# Patient Record
Sex: MALE | Race: WHITE | NOT HISPANIC OR LATINO | Employment: OTHER | URBAN - METROPOLITAN AREA
[De-identification: names, ages, dates, MRNs, and addresses within clinical notes are randomized per-mention and may not be internally consistent; named-entity substitution may affect disease eponyms.]

---

## 2017-09-14 ENCOUNTER — OFFICE VISIT (OUTPATIENT)
Dept: PODIATRY | Facility: CLINIC | Age: 57
End: 2017-09-14
Payer: COMMERCIAL

## 2017-09-14 VITALS
BODY MASS INDEX: 23.72 KG/M2 | WEIGHT: 186 LBS | DIASTOLIC BLOOD PRESSURE: 70 MMHG | HEART RATE: 62 BPM | SYSTOLIC BLOOD PRESSURE: 124 MMHG

## 2017-09-14 DIAGNOSIS — L60.0 INGROWING NAIL: Primary | ICD-10-CM

## 2017-09-14 DIAGNOSIS — L84 CORNS AND CALLOSITIES: ICD-10-CM

## 2017-09-14 DIAGNOSIS — B35.1 DERMATOPHYTOSIS OF NAIL: ICD-10-CM

## 2017-09-14 PROCEDURE — 11055 PARING/CUTG B9 HYPRKER LES 1: CPT | Performed by: PODIATRIST

## 2017-09-14 PROCEDURE — 11730 AVULSION NAIL PLATE SIMPLE 1: CPT | Mod: T5 | Performed by: PODIATRIST

## 2017-09-14 PROCEDURE — 11720 DEBRIDE NAIL 1-5: CPT | Mod: 59 | Performed by: PODIATRIST

## 2017-09-14 PROCEDURE — 99204 OFFICE O/P NEW MOD 45 MIN: CPT | Mod: 25 | Performed by: PODIATRIST

## 2017-09-14 RX ORDER — CEPHALEXIN 500 MG/1
500 CAPSULE ORAL
COMMUNITY
Start: 2017-09-04 | End: 2017-09-14

## 2017-09-14 NOTE — MR AVS SNAPSHOT
After Visit Summary   9/14/2017    Freeman Velazquez    MRN: [id number]           Patient Information     Date Of Birth          1960        Visit Information        Provider Department      9/14/2017 1:30 PM Phan Mccracken DPM [facility]        Today's Diagnoses     Ingrowing nail    -  1    Dermatophytosis of nail        Corns and callosities          Care Instructions    Weight management plan: Patient was referred to their PCP to discuss a diet and exercise plan.            Follow-ups after your visit        Your next 10 appointments already scheduled     Sep 19, 2017  3:20 PM CDT   Office Visit with Kimmy Bell MD   [facility] ([facility] )    [address] [place] [address]  [address] [place] [id number] [phone]           Bring a current list of meds and any records pertaining to this visit. For Physicals, please bring immunization records and any forms needing to be filled out. Please arrive 10 minutes early to complete paperwork.              Who to contact     If you have questions or need follow up information about today's clinic visit or your schedule please contact [facility] directly at [phone].  Normal or non-critical lab and imaging results will be communicated to you by MyChart, letter or phone within 4 business days after the clinic has received the results. If you do not hear from us within 7 days, please contact the clinic through Swypehart or phone. If you have a critical or abnormal lab result, we will notify you by phone as soon as possible.  Submit refill requests through Souqalmal or call your pharmacy and they will forward the refill request to us. Please allow 3 business days for your refill to be completed.          Additional Information About Your Visit        Swypehart Information     Souqalmal lets you send messages to your doctor, view your test results, renew your  "prescriptions, schedule appointments and more. To sign up, go to [email or web address] . Click on \"Log in\" on the left side of the screen, which will take you to the Welcome page. Then click on \"Sign up Now\" on the right side of the page.     You will be asked to enter the access code listed below, as well as some personal information. Please follow the directions to create your username and password.     Your access code is: [email or web address]  Expires: 2017 11:17 AM     Your access code will  in 90 days. If you need help or a new code, please call your [facility] clinic or [phone].        Care EveryWhere ID     This is your Care EveryWhere ID. This could be used by other organizations to access your [facility] medical records  [id number]        Your Vitals Were     Pulse BMI (Body Mass Index)                62 23.72 kg/m2           Blood Pressure from Last 3 Encounters:   17 124/70   16 138/76   16 100/70    Weight from Last 3 Encounters:   17 84.4 kg (186 lb)   16 84.8 kg (187 lb)   14 94.8 kg (209 lb)              We Performed the Following     DEBRIDEMENT OF NAIL(S), 1-5     REMOVAL OF NAIL PLATE SIMPLE SINGLE     TRIM HYPERKERATOTIC SKIN LESION, ONE        Primary Care Provider    None Specified       No primary provider on file.        Equal Access to Services     [facility]: Hadii chiara ku hadasho Soandryali, waaxda luqadaha, qaybta kaalmada adeegyada, bianca wang . So [facility] [phone].    ATENCIÓN: Si habla español, tiene a lloyd disposición servicios gratuitos de asistencia lingüística. Llame al [phone].    We comply with applicable federal civil rights laws and [place] laws. We do not discriminate on the basis of race, color, national origin, age, disability sex, sexual orientation or gender identity.            Thank you!     Thank you for choosing [facility]  for your care. Our goal is always to provide you " with excellent care. Hearing back from our patients is one way we can continue to improve our services. Please take a few minutes to complete the written survey that you may receive in the mail after your visit with us. Thank you!             Your Updated Medication List - Protect others around you: Learn how to safely use, store and throw away your medicines at www.disposemymeds.org.          This list is accurate as of: 9/14/17 11:59 PM.  Always use your most recent med list.                   Brand Name Dispense Instructions for use Diagnosis    ALEVE PO      Take by mouth as needed for moderate pain        cephALEXin 500 MG capsule    KEFLEX     Take 500 mg by mouth

## 2017-09-15 NOTE — PROGRESS NOTES
Patient complains of thick left first toenails(s) .  Has had this for 5 years.  Had trauma to nail lost nail, and grew slow ever since.  Also has left foot sub fifth met head pain.  It is slowly getting worse.  Has had pain in the past which is aggravated by activity and relieved by rest.  Burning pain.  For last few weeks right hallux nail has been painful.  See in urgent care, placed on keflex and had abcess lanced.  This helped, but still having pain.    Trauma to this nail in past.  Non smoker.  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        ROS:  A 10-point review of systems was performed and is positive for that noted in the HPI and as seen below.  All other areas are negative.          Allergies   Allergen Reactions     Tetracycline Shortness Of Breath     Erythromycin        Current Outpatient Prescriptions   Medication Sig Dispense Refill     Naproxen Sodium (ALEVE PO) Take by mouth as needed for moderate pain         Patient Active Problem List   Diagnosis     CARDIOVASCULAR SCREENING; LDL GOAL LESS THAN 130     Distal radius fracture     Family history of malignant neoplasm of prostate     ACP (advance care planning)       Past Medical History:   Diagnosis Date     Arthritis      Renal colic        Past Surgical History:   Procedure Laterality Date     ARTHROSCOPY KNEE BILATERAL       COLONOSCOPY  4/16/2013    Procedure: COLONOSCOPY;;  Surgeon: Lewis Metcalf MD;  Location: [facility] OR       Family History   Problem Relation Age of Onset     Breast Cancer Mother      Allergies Mother      Anesthesia Reaction Mother      Arthritis Mother      Blood Disease Mother      Cardiovascular Father      Eye Disorder Father      HEART DISEASE Father      CANCER Father      multiple skin cancers     Macular Degeneration Father      Glaucoma Father      DIABETES Sister      DIABETES Maternal Grandmother      Thyroid Disease No family hx of      CEREBROVASCULAR DISEASE No family hx of      Hypertension No family hx  of        Social History   Substance Use Topics     Smoking status: Never Smoker     Smokeless tobacco: Never Used     Alcohol use Yes      Comment: 1 drink a week         /70 (BP Location: Left arm, Patient Position: Sitting)  Pulse 62  Wt 84.4 kg (186 lb)  BMI 23.72 kg/m2.      VConstitutional/ general:  Pt is in no apparent distress, appears well-nourished.  Cooperative with history and physical exam.     Psych:  The patient answered questions appropriately.  Normal affect.  Seems to have reasonable expectations, in terms of treatment.    Eyes:  Visual scanning/ tracking without deficit.    Ears:  Response to auditory stimuli is normal.  negative hearing aid devices.  Auricles in proper alignment.     Lymphatic:  Popliteal lymph nodes not enlarged.     Lungs:  Non labored breathing, non labored speech. No cough.  No audible wheezing. Even, quiet breathing.       Vascular:  Pedal pulses are palpable bilaterally for both the DP and PT arteries.  CFT < 3 sec.  No edema.  Pedal hair growth noted.     Neuro:  Alert and oriented x 3. Coordinated gait.  Light touch sensation is intact to the L4, L5, S1 distributions. No obvious deficits.  No evidence of neurological-based weakness, spasticity, or contracture in the lower extremities.    Derm: Normal texture and turgor.  No erythema, ecchymosis, or cyanosis.  No open lesions. left first toenails(s)  thickened, elongated, discolored, with subungual debris and lateral deviated.  No erythema, edema, drainage, pain on palpation.  No signs of subungual masses or exostosis.    Right hallux nail is very thick and proximal 1/3 loose.  Slight erythema surrounding nail.  Underlying nailbed healthy.      Callus left foot sub fifth met head.  Underlying tissue healthy.  No masses.        Musculoskeletal:    Lower extremity muscle strength is normal.  Patient is ambulatory without an assistive device or brace.  No gross deformities.  MS 5/5 all compartments.  Normal arch  with weightbearing.         A/P    Onychodystrophy left hallux nail  Right hallux nail paronychia  Left sub fifth met head callus.      Discussed all options with patient regarding left hallux nail.  unfortunately nothing can be done to make this nail normal again.  Mycotic nail manually debrided with a .  He will keep this clean and filled short.     Explained left sub fifth lesion is a callus and discussed cause.  Callus debrided with a fifteen blade.  He will keep this down with a pumice stone and stiff shoes at all times.      Discussed etiology and treatment options with the pt.  Risks and benefits of partial temporary nail removal were discussed in detail.  Obtained consent and blocked right hallux using 3 cc of 1% Lidocaine plain.  Sterile prep and avulsed the left hallux nail and placed dry sterile dressing.  Cappillary refill time wnl.  Pt tolerated procedure well.  Wound care instructions given and RTC PRN.               Phan Mccracken DPM, FACFAS

## 2017-09-19 ENCOUNTER — OFFICE VISIT (OUTPATIENT)
Dept: FAMILY MEDICINE | Facility: CLINIC | Age: 57
End: 2017-09-19
Payer: COMMERCIAL

## 2017-09-19 DIAGNOSIS — L82.1 SEBORRHEIC KERATOSES: ICD-10-CM

## 2017-09-19 DIAGNOSIS — Z12.83 SKIN CANCER SCREENING: Primary | ICD-10-CM

## 2017-09-19 DIAGNOSIS — L81.4 SOLAR LENTIGO: ICD-10-CM

## 2017-09-19 DIAGNOSIS — D18.01 CAPILLARY HEMANGIOMA OF SKIN: ICD-10-CM

## 2017-09-19 DIAGNOSIS — L57.0 AK (ACTINIC KERATOSIS): ICD-10-CM

## 2017-09-19 DIAGNOSIS — Z80.8 FAMILY HISTORY OF MALIGNANT MELANOMA OF SKIN: ICD-10-CM

## 2017-09-19 DIAGNOSIS — D22.9 MULTIPLE BENIGN MELANOCYTIC NEVI: ICD-10-CM

## 2017-09-19 PROCEDURE — 99213 OFFICE O/P EST LOW 20 MIN: CPT | Mod: 25 | Performed by: FAMILY MEDICINE

## 2017-09-19 PROCEDURE — 17000 DESTRUCT PREMALG LESION: CPT | Performed by: FAMILY MEDICINE

## 2017-09-19 NOTE — PATIENT INSTRUCTIONS
"FUTURE APPOINTMENTS  Follow up in 1 year(s) for a full-body skin cancer screening.    SUN PROTECTION INSTRUCTIONS  Sun damage can lead to skin cancer and premature aging of the skin.      The best way to protect from sun damage to your skin is to avoid the sun during peak hours (10 am - 2 pm) even on overcast days.      Use UPF sun-protective clothing, which while more expensive initially provides longer lasting coverage without having to worry about remembering to re-apply.  1. Wear a wide-brimmed hat and sunglasses.   2. Wear sun-protective clothing.  [employer] and other [employer] make sun protective clothing that are stylish, comfortable and cool. [employer] and other [employer] make UV arm sleeves suitable for golfing, gardening and other activities.      Sunscreen instructions:  1. Use sunscreens with Zinc Oxide, Titanium Dioxide or Avobenzone to protect from UVA rays.  2. Use SPF 30-50+ to protect from UVB rays.  3. Re-apply every 2 hours even if water resistant.  4. Apply on your face every day even when cloudy and even in the winter. UVA \"aging rays\" penetrate window glass and are just as strong in the winter as in the summer.    Product Recommendations:    Good examples include: Blue Lizard, EltaMD, Solbar    Good daily moisturizers with SPF: Vanicream, CeraVe.    For sensitive skin, consider : SkinMedica Essential Defense Mineral Shield Broad Spectrum SPF 35      Never use tanning beds. Tanning beds are associated with much higher risks of skin cancer.    All tanning damages the skin. Aim for ivory skin year round and you will have less trouble with your skin in years to come. There is no merit in getting \"a base tan\" before a warm weather vacation, as any tanning indicates your body's response to sun damage.    Stop smoking. Smokers have higher rates of skin cancer and also have premature skin wrinkling.    FYI  You should use about 3 tablespoons of sunscreen to protect your whole body. Thus a " typical eight ounce bottle of sunscreen should last 4 applications. Remember, that the SPF rating is compromised if you don t apply enough. Most people only apply 1/2 - 1/3 of the amount they need. Also don t forget areas such as your ears, feet, upper back and harder to reach places. Keep in mind that these amounts should be increased for larger body sizes.    Sunscreens with titanium dioxide and/or zinc oxide in the active ingredients are physical blockers as opposed to chemical blockers. Chemical-free sunscreens should not irritate the skin.    Spray-on sunscreens may be used for touch-up application only, not as a base layer. Also, use with caution around small children due to inhalation risk.    Avoid retinyl palmitate products.    Avoid combination products that include both sunscreen and insect repellant, as sunscreen should be applied every 2 hours, but insect repellant should not be applied as frequently.    SPF means sun protection factor, which is just the degree to which the sunscreen can protect against UVB rays. There is no rating system for UVA rays. SPF is calculated as the time skin will burn when sunscreen is applied vs. skin without sunscreen.    Water resistant sunscreens should be re-applied every 1-2 hours.    For more information:  http://www.skincancer.org/prevention/sun-protection/sunscreen/sunscreens-safe-and-effective    CRYOTHERAPY FOR ACTINIC KERATOSES POST-TREATMENT CARE INSTRUCTIONS  Actinic keratoses are benign, scaly or gritty lesions that appear in sun-exposed areas and may progress to skin cancers. For this reason, it is important to treat them before they become cancerous.  Liquid nitrogen is mildly uncomfortable when applied to the skin, but the discomfort rapidly subsides.    Post-Treatment:  You may experience burning and/or stinging immediately following the procedure. The discomfort from the procedure may persist over the next 12-24 hours. The area treated will look pinker  and slightly swollen before the healing process begins. You may also notice redness, swelling, tenderness, weeping and crusts or scabs. Healing time is approximately 10-14 days.    Blister - You may or may notice blistering from the freezing. If you develop an uncomfortable blister from today's treatment, you may gently puncture this with a needle that has been cleaned with alcohol. However, do not remove the protective skin layer of the blister.    Scab - After a few days, you may notice scaliness or scab formation. Do not pick at the scabs because this may cause slower healing and a permanent scar.    The skin may appear temporarily darker at the treatment site, but this usually fades over a period of months, provided that the area is protected from the sun.    Care of the areas treated:    Wash the area with a mild cleanser.    Gently pat dry.    Do not rub.     Keep protected from the sun during the healing process and for a full year following treatment as the skin continues to remodel during this time.    Apply Vaseline or Aquaphor ointment sparingly to the site for the first 7 days after treatment.    Do not use Neosporin, as many people eventually develop a medication allergy, that can easily be confused with an infection, to Neomycin.    Return if:  There should not be any residual scaling. If there is any concern that the lesion has persisted after 4-6 weeks, make an appointment for a re-check. Healing time does vary depending on your individual healing process and the area of the body treated. Most patients will be healed in one month.    Signs of Infection:  Thankfully this is rare. However if you notice persistent colored drainage, increasing pain, fever or other signs of infection, please call us at: [phone]

## 2017-09-19 NOTE — PROGRESS NOTES
[facility] - PRIMARY CARE SKIN    CC : skin cancer screening (full-body)  SUBJECTIVE:                                                    Freeman Velazquez is a 57 year old male who presents to clinic today for a full-body skin exam because of his family history of melanoma.    Bothersome lesions noticed by the patient or other skin concerns :  Issue One : Lesion on right upper cheek has peeled and flaked intermittently. Redness has persisted.  Onset : 1 year ago.  Enlarging : YES.  Bleeding : NO  Itchy or irritating : NO.  Pain or tenderness : NO.  Changing color : NO.  Issue Two : Lesion on right elbow is concerning due to irregular shape.  Onset : unknown.  Enlarging : NO.  Bleeding : NO  Itchy or irritating : YES - occasionally itchy.  Pain or tenderness : NO.  Changing color : YES.  Issue Three : Lesion on upper forehead.  Onset : years ago.  Enlarging : NO.  Bleeding : NO  Itchy or irritating : NO.  Pain or tenderness : NO.  Changing color : YES.    Personal history of skin cancer : NO.  Family history of skin cancer : YES - father  of metastatic malignant melanoma and melanoma in paternal uncle. No skin cancer in mother's side    Sun Exposure History  Previous history of significant sun exposure: YES  Sunscreen Use : YES, frequency : he reports increasingly frequent use as he ages.  Wide-brimmed hats : YES.  Avoids mid-day sun : YES.    Occupation :  (indoor).    Refer to electronic medical record (EMR) for past medical history and medications.    INTEGUMENTARY/SKIN: POSITIVE for changing lesions  ROS : 14 point review of systems was negative except the symptoms listed above in the HPI.    This document serves as a record of the services and decisions personally performed and made by Nita Bell MD. It was created on her behalf by Naveed Dumont, a trained medical scribe.  The creation of this document is based on the scribe's personal observations and the provider's statements to  "the medical scribe.  Naveed Dumont, September 19, 2017 3:14 PM      OBJECTIVE:                                                    GENERAL: healthy, alert and no distress  SKIN: Lopez Skin Type - II.  This patient was examined from the top of the head to the bottom of the feet  including scalp, face, neck, back, chest, breasts, buttocks, both arms, both legs, both hands, both feet, all 10 fingers and all 10 toes. The dermatoscope was used to help evaluate pigmented lesions.  Skin Pertinent Findings:  Face, Arms : Scattered, brown, macule(s) most consistent with benign solar lentigo.    Arms : Scattered, brown macules most consistent with benign nevi (melanocytic nevi)    Chest, Abdomen : Scattered, slightly raised, red lesion(s) consistent with capillary hemangioma. Scattered, \"stuck on\" appearing papules, raised, brown, coarse-textured, round lesion(s) most consistent with seborrheic keratoses.    Back : slightly raised, red lesion(s) consistent with capillary hemangioma. Few, brown macules most consistent with benign nevi (melanocytic nevi).    Significant Findings:  Right temple : 5 mm in size, erythematous, scaly, non-indurated lesion(s) most consistent with actinic keratoses.  Name : Liquid Nitrogen Cry-Ac Cryotherapy.  Indication : Pre-malignant lesion.  Location(s) : right temple - x1.  Completed by : Nita Bell MD  Note : Discussed natural history of lesion and treatment options. Prior to treatment, we discussed inflammation, tenderness post-procedure, the healing process, and the risks of pain, infection, scarring, blistering, and hypo-/hyperpigmentation after healing. Explained that these lesions may grow back and may need additional treatment or re-treatment. The patient expressed a desire to proceed with cryotherapy.    The lesion(s) was treated with liquid nitrogen Cry-Ac, five second freeze repeated twice with a pause to allow for the area to thaw. If this lesion should recur, then it needs to be " "re-evaluated.    Patient tolerated the procedure well and left in good condition.  Total number of lesions treated : 1.    Diagnostic Test Results:  none   .      ASSESSMENT:                                                      Encounter Diagnoses   Name Primary?     Skin cancer screening Yes     Family history of malignant melanoma of skin      AK (actinic keratosis)      Seborrheic keratoses      Solar lentigo      Multiple benign melanocytic nevi      Capillary hemangioma of skin          PLAN:                                                    Patient Instructions   FUTURE APPOINTMENTS  Follow up in 1 year(s) for a full-body skin cancer screening.    SUN PROTECTION INSTRUCTIONS  Sun damage can lead to skin cancer and premature aging of the skin.      The best way to protect from sun damage to your skin is to avoid the sun during peak hours (10 am - 2 pm) even on overcast days.      Use UPF sun-protective clothing, which while more expensive initially provides longer lasting coverage without having to worry about remembering to re-apply.  1. Wear a wide-brimmed hat and sunglasses.   2. Wear sun-protective clothing.  [employer] and other [employer] make sun protective clothing that are stylish, comfortable and cool. [employer] and other [employer] make UV arm sleeves suitable for golfing, gardening and other activities.      Sunscreen instructions:  1. Use sunscreens with Zinc Oxide, Titanium Dioxide or Avobenzone to protect from UVA rays.  2. Use SPF 30-50+ to protect from UVB rays.  3. Re-apply every 2 hours even if water resistant.  4. Apply on your face every day even when cloudy and even in the winter. UVA \"aging rays\" penetrate window glass and are just as strong in the winter as in the summer.    Product Recommendations:    Good examples include: Jacky Izaguirre, EltaMD, Solbar    Good daily moisturizers with SPF: Vanicream, CeraVe.    For sensitive skin, consider : SkinMedica Essential Defense " "Mineral Shield Broad Spectrum SPF 35      Never use tanning beds. Tanning beds are associated with much higher risks of skin cancer.    All tanning damages the skin. Aim for ivory skin year round and you will have less trouble with your skin in years to come. There is no merit in getting \"a base tan\" before a warm weather vacation, as any tanning indicates your body's response to sun damage.    Stop smoking. Smokers have higher rates of skin cancer and also have premature skin wrinkling.    FYI  You should use about 3 tablespoons of sunscreen to protect your whole body. Thus a typical eight ounce bottle of sunscreen should last 4 applications. Remember, that the SPF rating is compromised if you don t apply enough. Most people only apply 1/2 - 1/3 of the amount they need. Also don t forget areas such as your ears, feet, upper back and harder to reach places. Keep in mind that these amounts should be increased for larger body sizes.    Sunscreens with titanium dioxide and/or zinc oxide in the active ingredients are physical blockers as opposed to chemical blockers. Chemical-free sunscreens should not irritate the skin.    Spray-on sunscreens may be used for touch-up application only, not as a base layer. Also, use with caution around small children due to inhalation risk.    Avoid retinyl palmitate products.    Avoid combination products that include both sunscreen and insect repellant, as sunscreen should be applied every 2 hours, but insect repellant should not be applied as frequently.    SPF means sun protection factor, which is just the degree to which the sunscreen can protect against UVB rays. There is no rating system for UVA rays. SPF is calculated as the time skin will burn when sunscreen is applied vs. skin without sunscreen.    Water resistant sunscreens should be re-applied every 1-2 hours.    For more " information:  http://www.skincancer.org/prevention/sun-protection/sunscreen/sunscreens-safe-and-effective    CRYOTHERAPY FOR ACTINIC KERATOSES POST-TREATMENT CARE INSTRUCTIONS  Actinic keratoses are benign, scaly or gritty lesions that appear in sun-exposed areas and may progress to skin cancers. For this reason, it is important to treat them before they become cancerous.  Liquid nitrogen is mildly uncomfortable when applied to the skin, but the discomfort rapidly subsides.    Post-Treatment:  You may experience burning and/or stinging immediately following the procedure. The discomfort from the procedure may persist over the next 12-24 hours. The area treated will look pinker and slightly swollen before the healing process begins. You may also notice redness, swelling, tenderness, weeping and crusts or scabs. Healing time is approximately 10-14 days.    Blister - You may or may notice blistering from the freezing. If you develop an uncomfortable blister from today's treatment, you may gently puncture this with a needle that has been cleaned with alcohol. However, do not remove the protective skin layer of the blister.    Scab - After a few days, you may notice scaliness or scab formation. Do not pick at the scabs because this may cause slower healing and a permanent scar.    The skin may appear temporarily darker at the treatment site, but this usually fades over a period of months, provided that the area is protected from the sun.    Care of the areas treated:    Wash the area with a mild cleanser.    Gently pat dry.    Do not rub.     Keep protected from the sun during the healing process and for a full year following treatment as the skin continues to remodel during this time.    Apply Vaseline or Aquaphor ointment sparingly to the site for the first 7 days after treatment.    Do not use Neosporin, as many people eventually develop a medication allergy, that can easily be confused with an infection, to  Neomycin.    Return if:  There should not be any residual scaling. If there is any concern that the lesion has persisted after 4-6 weeks, make an appointment for a re-check. Healing time does vary depending on your individual healing process and the area of the body treated. Most patients will be healed in one month.    Signs of Infection:  Thankfully this is rare. However if you notice persistent colored drainage, increasing pain, fever or other signs of infection, please call us at: [phone]    The patient was counseled about sunscreens and sun avoidance. The patient was counseled to check the skin regularly and report any lesion that is new, changing, itching, scabbing, bleeding or otherwise bothersome. The patient was discharged ambulatory and in stable condition.    Educational Brochures given to patient : skin cancer.       PROCEDURES:                                                    None.    TT : 25 minutes.  CT : 15 minutes.      The information in this document, created by the medical scribe for me, accurately reflects the services I personally performed and the decisions made by me. I have reviewed and approved this document for accuracy prior to leaving the patient care area.  Nita Bell MD September 19, 2017 3:14 PM  [facility] - PRIMARY CARE SKIN

## 2018-01-04 ENCOUNTER — OFFICE VISIT (OUTPATIENT)
Dept: FAMILY MEDICINE | Facility: CLINIC | Age: 58
End: 2018-01-04
Payer: COMMERCIAL

## 2018-01-04 VITALS
WEIGHT: 190.5 LBS | TEMPERATURE: 97.5 F | BODY MASS INDEX: 24.45 KG/M2 | HEIGHT: 74 IN | OXYGEN SATURATION: 99 % | HEART RATE: 62 BPM | RESPIRATION RATE: 12 BRPM | SYSTOLIC BLOOD PRESSURE: 132 MMHG | DIASTOLIC BLOOD PRESSURE: 70 MMHG

## 2018-01-04 DIAGNOSIS — K21.00 GASTROESOPHAGEAL REFLUX DISEASE WITH ESOPHAGITIS: ICD-10-CM

## 2018-01-04 DIAGNOSIS — R10.11 ABDOMINAL PAIN, RIGHT UPPER QUADRANT: Primary | ICD-10-CM

## 2018-01-04 LAB
ALBUMIN SERPL-MCNC: 4 G/DL (ref 3.4–5)
ALP SERPL-CCNC: 98 U/L (ref 40–150)
ALT SERPL W P-5'-P-CCNC: 36 U/L (ref 0–70)
AMYLASE SERPL-CCNC: 44 U/L (ref 30–110)
ANION GAP SERPL CALCULATED.3IONS-SCNC: 10 MMOL/L (ref 3–14)
AST SERPL W P-5'-P-CCNC: 23 U/L (ref 0–45)
BASOPHILS # BLD AUTO: 0 10E9/L (ref 0–0.2)
BASOPHILS NFR BLD AUTO: 0.3 %
BILIRUB SERPL-MCNC: 0.6 MG/DL (ref 0.2–1.3)
BUN SERPL-MCNC: 15 MG/DL (ref 7–30)
CALCIUM SERPL-MCNC: 8.7 MG/DL (ref 8.5–10.1)
CHLORIDE SERPL-SCNC: 103 MMOL/L (ref 94–109)
CO2 SERPL-SCNC: 28 MMOL/L (ref 20–32)
CREAT SERPL-MCNC: 0.89 MG/DL (ref 0.66–1.25)
DIFFERENTIAL METHOD BLD: NORMAL
EOSINOPHIL # BLD AUTO: 0.2 10E9/L (ref 0–0.7)
EOSINOPHIL NFR BLD AUTO: 2.4 %
ERYTHROCYTE [DISTWIDTH] IN BLOOD BY AUTOMATED COUNT: 14.2 % (ref 10–15)
GFR SERPL CREATININE-BSD FRML MDRD: 88 ML/MIN/1.7M2
GLUCOSE SERPL-MCNC: 82 MG/DL (ref 70–99)
HCT VFR BLD AUTO: 44 % (ref 40–53)
HGB BLD-MCNC: 14.9 G/DL (ref 13.3–17.7)
LIPASE SERPL-CCNC: 133 U/L (ref 73–393)
LYMPHOCYTES # BLD AUTO: 2 10E9/L (ref 0.8–5.3)
LYMPHOCYTES NFR BLD AUTO: 30.5 %
MCH RBC QN AUTO: 30.3 PG (ref 26.5–33)
MCHC RBC AUTO-ENTMCNC: 33.9 G/DL (ref 31.5–36.5)
MCV RBC AUTO: 89 FL (ref 78–100)
MONOCYTES # BLD AUTO: 0.7 10E9/L (ref 0–1.3)
MONOCYTES NFR BLD AUTO: 10.1 %
NEUTROPHILS # BLD AUTO: 3.8 10E9/L (ref 1.6–8.3)
NEUTROPHILS NFR BLD AUTO: 56.7 %
PLATELET # BLD AUTO: 337 10E9/L (ref 150–450)
POTASSIUM SERPL-SCNC: 3.9 MMOL/L (ref 3.4–5.3)
PROT SERPL-MCNC: 7.8 G/DL (ref 6.8–8.8)
RBC # BLD AUTO: 4.92 10E12/L (ref 4.4–5.9)
SODIUM SERPL-SCNC: 141 MMOL/L (ref 133–144)
WBC # BLD AUTO: 6.7 10E9/L (ref 4–11)

## 2018-01-04 PROCEDURE — 82150 ASSAY OF AMYLASE: CPT | Performed by: FAMILY MEDICINE

## 2018-01-04 PROCEDURE — 99213 OFFICE O/P EST LOW 20 MIN: CPT | Performed by: FAMILY MEDICINE

## 2018-01-04 PROCEDURE — 80053 COMPREHEN METABOLIC PANEL: CPT | Performed by: FAMILY MEDICINE

## 2018-01-04 PROCEDURE — 36415 COLL VENOUS BLD VENIPUNCTURE: CPT | Performed by: FAMILY MEDICINE

## 2018-01-04 PROCEDURE — 85025 COMPLETE CBC W/AUTO DIFF WBC: CPT | Performed by: FAMILY MEDICINE

## 2018-01-04 PROCEDURE — 83690 ASSAY OF LIPASE: CPT | Performed by: FAMILY MEDICINE

## 2018-01-04 ASSESSMENT — PAIN SCALES - GENERAL: PAINLEVEL: MODERATE PAIN (4)

## 2018-01-04 NOTE — PROGRESS NOTES
SUBJECTIVE:   Freeman Velazquez is a 57 year old male who presents to clinic today for the following health issues:      Patient presents with:  Abdominal Pain: x 2.5 weeks, having right side of abdomen pain- pain comes and goes, burning pain, pain goes away when changing position-sometimes             Problem list and histories reviewed & adjusted, as indicated.  Additional history: as documented    Patient Active Problem List   Diagnosis     CARDIOVASCULAR SCREENING; LDL GOAL LESS THAN 130     Distal radius fracture     Family history of malignant neoplasm of prostate     ACP (advance care planning)     Family history of malignant melanoma of skin     Past Surgical History:   Procedure Laterality Date     ARTHROSCOPY KNEE BILATERAL       COLONOSCOPY  4/16/2013    Procedure: COLONOSCOPY;;  Surgeon: Lewis Metcalf MD;  Location:  OR       Social History   Substance Use Topics     Smoking status: Never Smoker     Smokeless tobacco: Never Used     Alcohol use Yes      Comment: 1 drink a week     Family History   Problem Relation Age of Onset     Breast Cancer Mother      Allergies Mother      Anesthesia Reaction Mother      Arthritis Mother      Blood Disease Mother      Cardiovascular Father      Eye Disorder Father      HEART DISEASE Father      CANCER Father      multiple skin cancers     Macular Degeneration Father      Glaucoma Father      DIABETES Sister      DIABETES Maternal Grandmother      Thyroid Disease No family hx of      CEREBROVASCULAR DISEASE No family hx of      Hypertension No family hx of          Current Outpatient Prescriptions   Medication Sig Dispense Refill     IBUPROFEN PO        Naproxen Sodium (ALEVE PO) Take by mouth as needed for moderate pain       Allergies   Allergen Reactions     Tetracycline Shortness Of Breath     Erythromycin      BP Readings from Last 3 Encounters:   01/04/18 132/70   09/14/17 124/70   03/24/16 138/76    Wt Readings from Last 3 Encounters:   01/04/18  "190 lb 8 oz (86.4 kg)   09/14/17 186 lb (84.4 kg)   03/04/16 187 lb (84.8 kg)                  Labs reviewed in EPIC        Reviewed and updated as needed this visit by clinical staffTobacco  Allergies  Meds  Med Hx  Surg Hx  Fam Hx  Soc Hx      Reviewed and updated as needed this visit by Provider         ROS:  This 57 year old male is here today because he has had sharp pain in his right upper abdomen off and on for the past 2 weeks. He has history of GERD with esophageal stricture and is on zantac daily. He is due to have his esophagus stretched again soon as sometimes even milk gets stuck in his throat. He has always had a \"sensative stomach\" and rarely eats out. Likes bland foods and doesn't eat past 6:30 p.m. His pain is not related to eating. He has had no nausea, diarrhea, or fevers. He had an attempt at a colonoscopy 4/2013 but the prep was not good and he was advised to have it repeated. However, he just could not drink that gallon of go-lytely. He was told that he had \"appendicitis\" at age 1 but it resolved on its own without surgery. He has felt some fullness in his right upper and mid abdomen lately and pain into his right lower abdomen as well. No back pain.  He had normal cat scan of abdomen 3/2016 but he does have multiple liver cysts. He works for the [employer] at 4:30 in the morning. Hasn't missed work yet. All other review of systems are negative  Personal, family, and social history reviewed with patient and revised.         OBJECTIVE:     /70  Pulse 62  Temp 97.5  F (36.4  C) (Oral)  Resp 12  Ht 6' 1.9\" (1.877 m)  Wt 190 lb 8 oz (86.4 kg)  SpO2 99%  BMI 24.53 kg/m2  Body mass index is 24.53 kg/(m^2).  GENERAL: healthy, alert and no distress  EYES: Eyes grossly normal to inspection, PERRL and conjunctivae and sclerae normal  HENT: ear canals and TM's normal, nose and mouth without ulcers or lesions  NECK: no adenopathy, no asymmetry, masses, or scars and thyroid normal to " palpation  RESP: lungs clear to auscultation - no rales, rhonchi or wheezes  CV: regular rate and rhythm, normal S1 S2, no S3 or S4, no murmur, click or rub, no peripheral edema and peripheral pulses strong  ABDOMEN: soft, no hepatosplenomegaly, no masses and bowel sounds normal. But he is tender over his right upper abdomen and right lower abdomen as well as in the epigastric area. Hard to saw the cause at this time.   MS: no gross musculoskeletal defects noted, no edema    Diagnostic Test Results:  none     ASSESSMENT/PLAN:              1. Abdominal pain, right upper quadrant  As above, need to rule out gallstones   - US Abdomen Complete; Future  - Comprehensive metabolic panel  - CBC with platelets differential  - Amylase  - Lipase    2. Gastroesophageal reflux disease with esophagitis  As above   - ranitidine (ZANTAC) 150 MG tablet; Take 1 tablet (150 mg) by mouth 2 times daily  Dispense: 60 tablet; Refill: 1    Call if no improvement. Then I will order cat scan of abdomen and pelvis    BJ WILLOUGHBY MD  [facility]

## 2018-01-04 NOTE — PATIENT INSTRUCTIONS
[facility]    If you have any questions regarding to your visit please contact your care team:       Team Purple:   Clinic Hours Telephone Number   Dr. Samara Dumont   7am-7pm  Monday - Thursday   7am-5pm  Fridays  [phone]  (Appointment scheduling available 24/7)    Questions about your Visit?   Team Line:  [phone]   Urgent Care - [place] and [facility] - 11am-9pm Monday-Friday Saturday-Sunday- 9am-5pm   [place] - 5pm-9pm Monday-Friday Saturday-Sunday- 9am-5pm  [phone] - [facility]  [phone] - [place]       What options do I have for visits at the clinic other than the traditional office visit?  To expand how we care for you, many of our providers are utilizing electronic visits (e-visits) and telephone visits, when medically appropriate, for interactions with their patients rather than a visit in the clinic.   We also offer nurse visits for many medical concerns. Just like any other service, we will bill your insurance company for this type of visit based on time spent on the phone with your provider. Not all insurance companies cover these visits. Please check with your medical insurance if this type of visit is covered. You will be responsible for any charges that are not paid by your insurance.      E-visits via [employer]:  generally incur a $35.00 fee.  Telephone visits:  Time spent on the phone: *charged based on time that is spent on the phone in increments of 10 minutes. Estimated cost:   5-10 mins $30.00   11-20 mins. $59.00   21-30 mins. $85.00     Use [employer] (secure email communication and access to your chart) to send your primary care provider a message or make an appointment. Ask someone on your Team how to sign up for [employer].  For a Price Quote for your services, please call our Consumer Price Line at [phone].  As always, Thank you for trusting us with your health care needs!

## 2018-01-04 NOTE — LETTER
[facility]  [address] [place] [address]  [place] [address]    January 5, 2018    Freeman Johnson Caroline  [address] [place] [id number]          Dear Freeman,    All of your blood tests are normal. The ultrasound will give more information    Enclosed is a copy of your results.     Results for orders placed or performed in visit on 01/04/18   Comprehensive metabolic panel   Result Value Ref Range    Sodium 141 133 - 144 mmol/L    Potassium 3.9 3.4 - 5.3 mmol/L    Chloride 103 94 - 109 mmol/L    Carbon Dioxide 28 20 - 32 mmol/L    Anion Gap 10 3 - 14 mmol/L    Glucose 82 70 - 99 mg/dL    Urea Nitrogen 15 7 - 30 mg/dL    Creatinine 0.89 0.66 - 1.25 mg/dL    GFR Estimate 88 >60 mL/min/1.7m2    GFR Estimate If Black >90 >60 mL/min/1.7m2    Calcium 8.7 8.5 - 10.1 mg/dL    Bilirubin Total 0.6 0.2 - 1.3 mg/dL    Albumin 4.0 3.4 - 5.0 g/dL    Protein Total 7.8 6.8 - 8.8 g/dL    Alkaline Phosphatase 98 40 - 150 U/L    ALT 36 0 - 70 U/L    AST 23 0 - 45 U/L   CBC with platelets differential   Result Value Ref Range    WBC 6.7 4.0 - 11.0 10e9/L    RBC Count 4.92 4.4 - 5.9 10e12/L    Hemoglobin 14.9 13.3 - 17.7 g/dL    Hematocrit 44.0 40.0 - 53.0 %    MCV 89 78 - 100 fl    MCH 30.3 26.5 - 33.0 pg    MCHC 33.9 31.5 - 36.5 g/dL    RDW 14.2 10.0 - 15.0 %    Platelet Count 337 150 - 450 10e9/L    Diff Method Automated Method     % Neutrophils 56.7 %    % Lymphocytes 30.5 %    % Monocytes 10.1 %    % Eosinophils 2.4 %    % Basophils 0.3 %    Absolute Neutrophil 3.8 1.6 - 8.3 10e9/L    Absolute Lymphocytes 2.0 0.8 - 5.3 10e9/L    Absolute Monocytes 0.7 0.0 - 1.3 10e9/L    Absolute Eosinophils 0.2 0.0 - 0.7 10e9/L    Absolute Basophils 0.0 0.0 - 0.2 10e9/L   Amylase   Result Value Ref Range    Amylase 44 30 - 110 U/L   Lipase   Result Value Ref Range    Lipase 133 73 - 393 U/L       If you have any questions or concerns, please call myself or my nurse at  [phone].      Sincerely,        Samara Porras MD/marks

## 2018-01-04 NOTE — MR AVS SNAPSHOT
After Visit Summary   1/4/2018    Freeman Velazquez    MRN: [id number]           Patient Information     Date Of Birth          1960        Visit Information        Provider Department      1/4/2018 3:15 PM Samara Porras MD [facility]        Today's Diagnoses     Abdominal pain, right upper quadrant    -  1      Care Instructions    [facility]    If you have any questions regarding to your visit please contact your care team:       Team Purple:   Clinic Hours Telephone Number   Dr. Samara Dumont   7am-7pm  Monday - Thursday   7am-5pm  Fridays  [phone]  (Appointment scheduling available 24/7)    Questions about your Visit?   Team Line:  [phone]   Urgent Care - [place] - 11am-9pm Monday-Friday Saturday-Sunday- 9am-5pm   [place] - 5pm-9pm Monday-Friday Saturday-Sunday- 9am-5pm  [phone] - [facility]  [phone] - [place]       What options do I have for visits at the clinic other than the traditional office visit?  To expand how we care for you, many of our providers are utilizing electronic visits (e-visits) and telephone visits, when medically appropriate, for interactions with their patients rather than a visit in the clinic.   We also offer nurse visits for many medical concerns. Just like any other service, we will bill your insurance company for this type of visit based on time spent on the phone with your provider. Not all insurance companies cover these visits. Please check with your medical insurance if this type of visit is covered. You will be responsible for any charges that are not paid by your insurance.      E-visits via [employer]:  generally incur a $35.00 fee.  Telephone visits:  Time spent on the phone: *charged based on time that is spent on the phone in increments of 10 minutes. Estimated cost:   5-10 mins $30.00   11-20 mins. $59.00  "  21-30 mins. $85.00     Use [employer] (secure email communication and access to your chart) to send your primary care provider a message or make an appointment. Ask someone on your Team how to sign up for [employer].  For a Price Quote for your services, please call our Consumer Price Line at [phone].  As always, Thank you for trusting us with your health care needs!              Follow-ups after your visit        Future tests that were ordered for you today     Open Future Orders        Priority Expected Expires Ordered    US Abdomen Complete Routine  1/4/2019 1/4/2018            Who to contact     If you have questions or need follow up information about today's clinic visit or your schedule please contact [facility] directly at [phone].  Normal or non-critical lab and imaging results will be communicated to you by [employer], letter or phone within 4 business days after the clinic has received the results. If you do not hear from us within 7 days, please contact the clinic through [employer] or phone. If you have a critical or abnormal lab result, we will notify you by phone as soon as possible.  Submit refill requests through [employer] or call your pharmacy and they will forward the refill request to us. Please allow 3 business days for your refill to be completed.          Additional Information About Your Visit        [employer] Information     [employer] lets you send messages to your doctor, view your test results, renew your prescriptions, schedule appointments and more. To sign up, go to [email or web address] . Click on \"Log in\" on the left side of the screen, which will take you to the Welcome page. Then click on \"Sign up Now\" on the right side of the page.     You will be asked to enter the access code listed below, as well as some personal information. Please follow the directions to create your username and password.     Your access code is: [email or web address]  Expires: 4/4/2018  3:28 PM     Your " "access code will  in 90 days. If you need help or a new code, please call your [facility] clinic or [phone].        Care EveryWhere ID     This is your Care EveryWhere ID. This could be used by other organizations to access your [facility] medical records  [id number]        Your Vitals Were     Pulse Temperature Respirations Height Pulse Oximetry BMI (Body Mass Index)    62 97.5  F (36.4  C) (Oral) 12 6' 1.9\" (1.877 m) 99% 24.53 kg/m2       Blood Pressure from Last 3 Encounters:   18 132/70   17 124/70   16 138/76    Weight from Last 3 Encounters:   18 190 lb 8 oz (86.4 kg)   17 186 lb (84.4 kg)   16 187 lb (84.8 kg)              We Performed the Following     Amylase     CBC with platelets differential     Comprehensive metabolic panel     Lipase        Primary Care Provider Fax #    Physician No Ref-Primary [phone]       No address on file        Equal Access to Services     MICHELLE REINOSO : Hadii chiara suarezo Sohansel, waaxda luqadaha, qaybta kaalmada dillan, bianca wang . So [facility] [phone].    ATENCIÓN: Si habla español, tiene a lloyd disposición servicios gratuitos de asistencia lingüística. Llame al [phone].    We comply with applicable federal civil rights laws and [place] laws. We do not discriminate on the basis of race, color, national origin, age, disability, sex, sexual orientation, or gender identity.            Thank you!     Thank you for choosing [facility] [place]  for your care. Our goal is always to provide you with excellent care. Hearing back from our patients is one way we can continue to improve our services. Please take a few minutes to complete the written survey that you may receive in the mail after your visit with us. Thank you!             Your Updated Medication List - Protect others around you: Learn how to safely use, store and throw away your medicines at www.disposemymeds.org.        "   This list is accurate as of: 1/4/18  3:28 PM.  Always use your most recent med list.                   Brand Name Dispense Instructions for use Diagnosis    ALEVE PO      Take by mouth as needed for moderate pain        IBUPROFEN PO

## 2018-01-04 NOTE — NURSING NOTE
"Chief Complaint   Patient presents with     Abdominal Pain     x 2.5 weeks, having right side of abdomen pain- pain comes and goes, burning pain, pain goes away when changing position-sometimes       Initial /70  Pulse 62  Temp 97.5  F (36.4  C) (Oral)  Resp 12  Ht 6' 1.9\" (1.877 m)  Wt 190 lb 8 oz (86.4 kg)  SpO2 99%  BMI 24.53 kg/m2 Estimated body mass index is 24.53 kg/(m^2) as calculated from the following:    Height as of this encounter: 6' 1.9\" (1.877 m).    Weight as of this encounter: 190 lb 8 oz (86.4 kg).  Medication Reconciliation: complete     An SHARRI Johnson    "

## 2018-01-09 ENCOUNTER — RADIANT APPOINTMENT (OUTPATIENT)
Dept: ULTRASOUND IMAGING | Facility: CLINIC | Age: 58
End: 2018-01-09
Attending: FAMILY MEDICINE
Payer: COMMERCIAL

## 2018-01-09 DIAGNOSIS — R10.11 ABDOMINAL PAIN, RIGHT UPPER QUADRANT: ICD-10-CM

## 2018-01-09 PROCEDURE — 76700 US EXAM ABDOM COMPLETE: CPT

## 2019-01-10 ENCOUNTER — OFFICE VISIT (OUTPATIENT)
Dept: FAMILY MEDICINE | Facility: CLINIC | Age: 59
End: 2019-01-10
Payer: COMMERCIAL

## 2019-01-10 VITALS
HEART RATE: 72 BPM | DIASTOLIC BLOOD PRESSURE: 74 MMHG | OXYGEN SATURATION: 97 % | BODY MASS INDEX: 25.11 KG/M2 | SYSTOLIC BLOOD PRESSURE: 113 MMHG | TEMPERATURE: 98.1 F | WEIGHT: 195 LBS

## 2019-01-10 DIAGNOSIS — K59.00 CONSTIPATION, UNSPECIFIED CONSTIPATION TYPE: ICD-10-CM

## 2019-01-10 DIAGNOSIS — M77.11 LATERAL EPICONDYLITIS, RIGHT ELBOW: Primary | ICD-10-CM

## 2019-01-10 PROCEDURE — 99214 OFFICE O/P EST MOD 30 MIN: CPT | Performed by: FAMILY MEDICINE

## 2019-01-10 NOTE — PROGRESS NOTES
"    SUBJECTIVE:   Freeman Velazquez is a 58 year old male who presents to clinic today for the following health issues:    ABDOMINAL   PAIN     Onset: x 3 mo    Description:   Character: Burning  Location: suprapubic region  Radiation: None    Intensity: 4/10    Progression of Symptoms:  worsening    Accompanying Signs & Symptoms:  Fever/Chills?: Waking up really warm  Gas/Bloating: YES- Gas  Nausea: no   Vomitting: no   Diarrhea?: no   Constipation:YES - goes evrey 4-5 days  Dysuria or Hematuria: no    History:   Trauma: no   Previous similar pain: no    Previous tests done: none    Precipitating factors:   Does the pain change with:     Food: no      BM: no     Urination: no     Therapies Tried: Dulcolax and trying to eat more fiber.         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  It sounds like he got golytely vs miralax/gatorade prep   Discussed prepping for 1 month or so with miralax and then doing that type of prep if GI agrees       Past Medical History:   Diagnosis Date     Arthritis      Renal colic        Past Surgical History:   Procedure Laterality Date     ARTHROSCOPY KNEE BILATERAL       " COLONOSCOPY  4/16/2013    Procedure: COLONOSCOPY;;  Surgeon: Lewis Metcalf MD;  Location: [facility] OR   upper endoscopy and dilitation for esophageal stricture       Family History   Problem Relation Age of Onset     Breast Cancer Mother      Allergies Mother      Anesthesia Reaction Mother      Arthritis Mother      Blood Disease Mother      Cardiovascular Father      Eye Disorder Father      Heart Disease Father      Cancer Father         multiple skin cancers     Macular Degeneration Father      Glaucoma Father      Diabetes Sister      Diabetes Maternal Grandmother      Thyroid Disease No family hx of      Cerebrovascular Disease No family hx of      Hypertension No family hx of        Social History     Tobacco Use     Smoking status: Never Smoker     Smokeless tobacco: Never Used   Substance Use Topics     Alcohol use: Yes     Comment: 1 drink a week     Current Outpatient Medications   Medication Sig Dispense Refill     IBUPROFEN PO        Naproxen Sodium (ALEVE PO) Take by mouth as needed for moderate pain       ranitidine (ZANTAC) 150 MG tablet Take 1 tablet (150 mg) by mouth 2 times daily 60 tablet 1       O: /74 (BP Location: Left arm, Patient Position: Sitting, Cuff Size: Adult Large)   Pulse 72   Temp 98.1  F (36.7  C) (Oral)   Wt 88.5 kg (195 lb)   SpO2 97%   BMI 25.11 kg/m      Pain at both medial and lateral elbow hurts   Pain with grasping hand     Turning causes pain     Chest wall normal to inspection and palpation. Good excursion bilaterally. Lungs clear to auscultation. Good air movement bilaterally without rales, wheezes, or rhonchi.   Regular rate and  rhythm. S1 and S2 normal, no murmurs, clicks, gallops or rubs. No edema or JVD.    The abdomen is soft without tenderness, guarding, mass, rebound or organomegaly. Bowel sounds are normal. No CVA tenderness or inguinal adenopathy noted.  Slight pain in the LLQ and slightly in the epigastric area         Problem list and histories  reviewed & adjusted, as indicated.        Tobacco  Allergies  Meds  Med Hx  Surg Hx  Fam Hx  Soc Hx      Reviewed and updated as needed this visit by Provider          ICD-10-CM    1. Lateral epicondylitis, right elbow M77.11    2. Constipation, unspecified constipation type K59.00 GASTROENTEROLOGY ADULT REF PROCEDURE ONLY     Patient needs colonoscopy   Most likely is constipation and IBS  May need special prep as noted.  He was told to contact the gi doctor's office to discuss previous problems     Wrist brace for tennis elbow and golfers elbow   Avoid lifting with hands down     Yearly follow up with dermatology due to family history of melanoma

## 2019-01-18 ENCOUNTER — TELEPHONE (OUTPATIENT)
Dept: FAMILY MEDICINE | Facility: CLINIC | Age: 59
End: 2019-01-18

## 2019-01-18 NOTE — TELEPHONE ENCOUNTER
Called and advised patient of the message below per provider.    Advised patient if increased abdominal pain, distended abdomen, nausea, vomiting occur to ER.    Patient stated understanding and agreeable with the plan of care. Dalia Andrade, RN-BSN, [facility] Triage.

## 2019-01-18 NOTE — TELEPHONE ENCOUNTER
Patient should continue the Miralax 1 capful.  He can increase to 2 x a day or he can add colace 1 tab 2 x a day to his once a day Miralax

## 2019-01-18 NOTE — TELEPHONE ENCOUNTER
Reason for call:  Patient reporting a symptom    Symptom or request: discomfort in intestines    Duration (how long have symptoms been present): couple of days    Have you been treated for this before? No    Additional comments: Patient states he is taking Miralax and it isn't doing anything for him.  He has a colonoscopy in a few days and he is worried about it. Wonders if there is something else to take.  He would like an RN or Doc to call him back today.    Phone Number patient can be reached at:  Cell number on file:    Telephone Information:   Mobile [phone]       Best Time:  anytime    Can we leave a detailed message on this number:  YES    Call taken on 1/18/2019 at 2:47 PM by Kimmy Abarca

## 2019-01-23 ENCOUNTER — TELEPHONE (OUTPATIENT)
Dept: FAMILY MEDICINE | Facility: CLINIC | Age: 59
End: 2019-01-23

## 2019-01-23 DIAGNOSIS — R11.0 NAUSEA: Primary | ICD-10-CM

## 2019-01-23 RX ORDER — ONDANSETRON 8 MG/1
8 TABLET, FILM COATED ORAL EVERY 8 HOURS PRN
Qty: 20 TABLET | Refills: 0 | Status: SHIPPED | OUTPATIENT
Start: 2019-01-23 | End: 2019-01-30

## 2019-01-23 ASSESSMENT — MIFFLIN-ST. JEOR: SCORE: 1759.73

## 2019-01-23 NOTE — TELEPHONE ENCOUNTER
Reason for Call:  Medication or medication refill:    Do you use a [facility]?  Name of the pharmacy and phone number for the current request: [facility] [place]    Name of the medication requested: Patient states he has a colonoscopy tomorrow. He was speaking with the surgery center staff about procedure and they advised patient to contact provider to get an anti nausea medication due to his sensitive stomach. Patient starts prep at 7PM tonight and will need prior to starting.    Other request:     Can we leave a detailed message on this number? YES    Phone number patient can be reached at: Cell number on file:    Telephone Information:   Mobile [phone]       Best Time: any    Call taken on 1/23/2019 at 12:51 PM by Moni Chauhan

## 2019-01-24 ENCOUNTER — HOSPITAL ENCOUNTER (OUTPATIENT)
Facility: AMBULATORY SURGERY CENTER | Age: 59
Discharge: HOME | End: 2019-01-24
Attending: SURGERY | Admitting: SURGERY
Payer: COMMERCIAL

## 2019-01-24 VITALS
HEIGHT: 74 IN | BODY MASS INDEX: 24.9 KG/M2 | WEIGHT: 194 LBS | RESPIRATION RATE: 16 BRPM | TEMPERATURE: 97.6 F | SYSTOLIC BLOOD PRESSURE: 113 MMHG | OXYGEN SATURATION: 97 % | HEART RATE: 66 BPM | DIASTOLIC BLOOD PRESSURE: 75 MMHG

## 2019-01-24 LAB — COLONOSCOPY: NORMAL

## 2019-01-24 PROCEDURE — 45385 COLONOSCOPY W/LESION REMOVAL: CPT | Performed by: SURGERY

## 2019-01-24 PROCEDURE — 99152 MOD SED SAME PHYS/QHP 5/>YRS: CPT | Mod: 59 | Performed by: SURGERY

## 2019-01-24 PROCEDURE — 45380 COLONOSCOPY AND BIOPSY: CPT | Mod: 59 | Performed by: SURGERY

## 2019-01-24 PROCEDURE — 45385 COLONOSCOPY W/LESION REMOVAL: CPT

## 2019-01-24 PROCEDURE — 45380 COLONOSCOPY AND BIOPSY: CPT | Mod: XS

## 2019-01-24 PROCEDURE — G8907 PT DOC NO EVENTS ON DISCHARG: HCPCS

## 2019-01-24 PROCEDURE — 99153 MOD SED SAME PHYS/QHP EA: CPT | Mod: 59 | Performed by: SURGERY

## 2019-01-24 PROCEDURE — 88305 TISSUE EXAM BY PATHOLOGIST: CPT | Performed by: PATHOLOGY

## 2019-01-24 PROCEDURE — G8918 PT W/O PREOP ORDER IV AB PRO: HCPCS

## 2019-01-24 RX ORDER — LIDOCAINE 40 MG/G
CREAM TOPICAL
Status: DISCONTINUED | OUTPATIENT
Start: 2019-01-24 | End: 2019-01-25 | Stop reason: HOSPADM

## 2019-01-24 RX ORDER — FENTANYL CITRATE 50 UG/ML
INJECTION, SOLUTION INTRAMUSCULAR; INTRAVENOUS PRN
Status: DISCONTINUED | OUTPATIENT
Start: 2019-01-24 | End: 2019-01-24 | Stop reason: HOSPADM

## 2019-01-24 NOTE — LETTER
[facility]           [address] [place] [address]           Tel [phone]  Freeman Velazquez  [address] [place] [id number]      January 28, 2019    Dear Freeman,  This letter is to inform you of the results of your pathology report on your colonoscopy.  If you have questions please feel free to call my assistant  At [phone] .    Your pathology report was:  Showed an Adenomatous polyp. This is a benign (not cancerous) growth; however these can become cancer over time. This polyp is usually removed completely at the time of the biopsy. Because it is an Adenomatous polyp you do have a slight higher risk for colon cancer. This is why you will need a repeat colonoscopy in approximately 3 years.  You will benefit from MAC.  Make sure your doctor orders it with MAC.  This is just added to the orders and is just typed into the area where they ask questions about your taking blood thinners.  This is administered by anesthesia to make you more comfortable than I can do safely during your colonoscopy.  This is done with anesthesia.   You have a lot of sharp turns in your colon and ansesthesia may be able to keep you more comfortable.  You will need and History and physical for this.  Just remind your primary doctor about this when ordering your next colonoscopy.  Remember to get cleaned out well as we talked about in your colonoscopy report.   If you do have further questions please don t hesitate to call my assistant at  .  We do not have someone answering the phone all the time at my assistants number so if leave a message may take a day or so to get back to you.  So if more urgent then call the below number.    To make an appointment call [phone]: .   Sincerely,     Javier Judge M.D.  ___

## 2019-01-24 NOTE — DISCHARGE INSTRUCTIONS
Your colons job is to absorb the liquid in your stool.  As we get older the colon or other medications can slow down the colon and allow the stool to get to firm.  Fiber mixes with your stool and does not allow all the water to get absorbed by the colon.  This will not give you diarrhea in fact I use it for people with diarrhea since it causes the stool to bulk up more and is easier to control.    Below are several fiber options.    For your constipation try using Metamucil or it's generic equivalent 1-2 tablespoons with a large glass of water or juice every day.      You can also use flax seed that is easily obtained at your grocery store. Make sure it is ground up and sprinkle 2 tablespoons on your cereal each morning and drink a large glass of water with it.  You can also mix in yogurt, and even bake in bread or muffins.    Flax seed seems to give less gas and does not have any taste.   If these are not working for you I would be glad to see you back in my clinic to discuss other options.  Call [phone]: to set up an appointment.    Sincerely     Javier Judge M.D.  Your prep was not the best so I recommend drinking a bottle of magnesium citrate the day before the prep to help with this.   At first I would recommend that you take magnesium citrate one bottle and drink it cold.  Take 1/2 the bottle and then 3 hours later drink the rest.  Drink plenty of water or juice with it as it will steal fluids from you.    You may benefit from MAC.  Make sure your doctor orders it with MAC.  This is just added to the orders and is just typed into the area where they ask questions about your taking blood thinners.  This is administered by anesthesia to make you more comfortable than I can do safely during your colonoscopy.  This is done with anesthesia.   You have a lot of sharp turns in your colon and ansesthesia may be able to keep you more comfortable.  You will need and History and physical for this.  Just remind  your primary doctor about this when ordering your next colonoscopy.

## 2019-01-28 LAB — COPATH REPORT: NORMAL

## 2020-01-10 ENCOUNTER — HOSPITAL ENCOUNTER (EMERGENCY)
Facility: CLINIC | Age: 60
Discharge: HOME | End: 2020-01-10
Attending: EMERGENCY MEDICINE | Admitting: EMERGENCY MEDICINE
Payer: COMMERCIAL

## 2020-01-10 ENCOUNTER — APPOINTMENT (OUTPATIENT)
Dept: CT IMAGING | Facility: CLINIC | Age: 60
End: 2020-01-10
Attending: EMERGENCY MEDICINE
Payer: COMMERCIAL

## 2020-01-10 VITALS
HEIGHT: 75 IN | HEART RATE: 56 BPM | BODY MASS INDEX: 25.09 KG/M2 | SYSTOLIC BLOOD PRESSURE: 121 MMHG | OXYGEN SATURATION: 100 % | DIASTOLIC BLOOD PRESSURE: 71 MMHG | TEMPERATURE: 98.6 F | WEIGHT: 201.8 LBS | RESPIRATION RATE: 16 BRPM

## 2020-01-10 DIAGNOSIS — R31.0 GROSS HEMATURIA: ICD-10-CM

## 2020-01-10 LAB
ALBUMIN SERPL-MCNC: 4.1 G/DL (ref 3.4–5)
ALBUMIN UR-MCNC: 10 MG/DL
ALP SERPL-CCNC: 124 U/L (ref 40–150)
ALT SERPL W P-5'-P-CCNC: 33 U/L (ref 0–70)
ANION GAP SERPL CALCULATED.3IONS-SCNC: 5 MMOL/L (ref 3–14)
APPEARANCE UR: ABNORMAL
AST SERPL W P-5'-P-CCNC: 17 U/L (ref 0–45)
BACTERIA #/AREA URNS HPF: ABNORMAL /HPF
BASOPHILS # BLD AUTO: 0 10E9/L (ref 0–0.2)
BASOPHILS NFR BLD AUTO: 0.5 %
BILIRUB SERPL-MCNC: 0.5 MG/DL (ref 0.2–1.3)
BILIRUB UR QL STRIP: NEGATIVE
BUN SERPL-MCNC: 17 MG/DL (ref 7–30)
CALCIUM SERPL-MCNC: 8.8 MG/DL (ref 8.5–10.1)
CHLORIDE SERPL-SCNC: 108 MMOL/L (ref 94–109)
CO2 SERPL-SCNC: 28 MMOL/L (ref 20–32)
COLOR UR AUTO: YELLOW
CREAT SERPL-MCNC: 0.91 MG/DL (ref 0.66–1.25)
DIFFERENTIAL METHOD BLD: NORMAL
EOSINOPHIL # BLD AUTO: 0.1 10E9/L (ref 0–0.7)
EOSINOPHIL NFR BLD AUTO: 1 %
ERYTHROCYTE [DISTWIDTH] IN BLOOD BY AUTOMATED COUNT: 13.6 % (ref 10–15)
GFR SERPL CREATININE-BSD FRML MDRD: >90 ML/MIN/{1.73_M2}
GLUCOSE SERPL-MCNC: 93 MG/DL (ref 70–99)
GLUCOSE UR STRIP-MCNC: NEGATIVE MG/DL
HCT VFR BLD AUTO: 47.1 % (ref 40–53)
HGB BLD-MCNC: 15.2 G/DL (ref 13.3–17.7)
HGB UR QL STRIP: ABNORMAL
IMM GRANULOCYTES # BLD: 0 10E9/L (ref 0–0.4)
IMM GRANULOCYTES NFR BLD: 0.2 %
KETONES UR STRIP-MCNC: NEGATIVE MG/DL
LEUKOCYTE ESTERASE UR QL STRIP: NEGATIVE
LYMPHOCYTES # BLD AUTO: 1.1 10E9/L (ref 0.8–5.3)
LYMPHOCYTES NFR BLD AUTO: 18.9 %
MCH RBC QN AUTO: 29 PG (ref 26.5–33)
MCHC RBC AUTO-ENTMCNC: 32.3 G/DL (ref 31.5–36.5)
MCV RBC AUTO: 90 FL (ref 78–100)
MONOCYTES # BLD AUTO: 0.4 10E9/L (ref 0–1.3)
MONOCYTES NFR BLD AUTO: 7.2 %
MUCOUS THREADS #/AREA URNS LPF: PRESENT /LPF
NEUTROPHILS # BLD AUTO: 4.2 10E9/L (ref 1.6–8.3)
NEUTROPHILS NFR BLD AUTO: 72.2 %
NITRATE UR QL: NEGATIVE
NRBC # BLD AUTO: 0 10*3/UL
NRBC BLD AUTO-RTO: 0 /100
PH UR STRIP: 5 PH (ref 5–7)
PLATELET # BLD AUTO: 334 10E9/L (ref 150–450)
POTASSIUM SERPL-SCNC: 3.5 MMOL/L (ref 3.4–5.3)
PROT SERPL-MCNC: 7.8 G/DL (ref 6.8–8.8)
RBC # BLD AUTO: 5.25 10E12/L (ref 4.4–5.9)
RBC #/AREA URNS AUTO: >182 /HPF (ref 0–2)
SODIUM SERPL-SCNC: 140 MMOL/L (ref 133–144)
SOURCE: ABNORMAL
SP GR UR STRIP: 1.01 (ref 1–1.03)
SQUAMOUS #/AREA URNS AUTO: 1 /HPF (ref 0–1)
UROBILINOGEN UR STRIP-MCNC: NORMAL MG/DL (ref 0–2)
WBC # BLD AUTO: 5.8 10E9/L (ref 4–11)
WBC #/AREA URNS AUTO: 3 /HPF (ref 0–5)

## 2020-01-10 PROCEDURE — 85025 COMPLETE CBC W/AUTO DIFF WBC: CPT

## 2020-01-10 PROCEDURE — 74176 CT ABD & PELVIS W/O CONTRAST: CPT

## 2020-01-10 PROCEDURE — 87086 URINE CULTURE/COLONY COUNT: CPT | Performed by: EMERGENCY MEDICINE

## 2020-01-10 PROCEDURE — 99284 EMERGENCY DEPT VISIT MOD MDM: CPT | Mod: Z6 | Performed by: EMERGENCY MEDICINE

## 2020-01-10 PROCEDURE — 81001 URINALYSIS AUTO W/SCOPE: CPT | Performed by: EMERGENCY MEDICINE

## 2020-01-10 PROCEDURE — 99284 EMERGENCY DEPT VISIT MOD MDM: CPT | Mod: 25 | Performed by: EMERGENCY MEDICINE

## 2020-01-10 PROCEDURE — 80053 COMPREHEN METABOLIC PANEL: CPT

## 2020-01-10 RX ORDER — NITROFURANTOIN 25; 75 MG/1; MG/1
100 CAPSULE ORAL 2 TIMES DAILY
Qty: 20 CAPSULE | Refills: 0 | Status: SHIPPED | OUTPATIENT
Start: 2020-01-10 | End: 2021-03-08

## 2020-01-10 ASSESSMENT — ENCOUNTER SYMPTOMS
SHORTNESS OF BREATH: 0
PSYCHIATRIC NEGATIVE: 1
BACK PAIN: 1
EYES NEGATIVE: 1
VOMITING: 0
HEADACHES: 0
NAUSEA: 0
ABDOMINAL PAIN: 1
NECK PAIN: 0
HEMATURIA: 1
ABDOMINAL PAIN: 0
FEVER: 0

## 2020-01-10 ASSESSMENT — MIFFLIN-ST. JEOR: SCORE: 1805.99

## 2020-01-10 NOTE — ED TRIAGE NOTES
Three days ago pt has bilateral flank pain and this morning pt noticed blood in his urine.  Pt denies any trauma.  Pt also states he has lower abd pain.

## 2020-01-10 NOTE — DISCHARGE INSTRUCTIONS
Please make an appointment to follow up with Urology Clinic (phone: [phone]) in 7-14 days.  Take Macrobid as directed.  Return for flank pain or worsening pain or fevers.  You have a 7 by 10 by 7 mm stone in the right renal pelvis which needs follow up

## 2020-01-10 NOTE — ED PROVIDER NOTES
"  History     Chief Complaint   Patient presents with     Hematuria     HPI  Freeman Velazquez is a 59 year old male who presents with bilateral low back pain and blood in his urine.  Patient has low back pain on both sides and a burning sensation across his lower abdomen.  Patient also noted blood in his urine that started yesterday.  He does have a history of an enlarged prostate but does not have difficulty with urinary retention.  He states he has urinated blood 3 times today.  Patient states he has a history of kidney stones but this does not feel like his usual kidney stones.  He denies fevers or falls.  He denies paresthesias and has no radicular symptoms.   Please see Triage note.    I have reviewed the Medications, Allergies, Past Medical and Surgical History, and Social History in the Epic system.    Review of Systems   Constitutional: Negative for fever.   Eyes: Negative.    Respiratory: Negative for shortness of breath.    Cardiovascular: Negative for chest pain.   Gastrointestinal: Negative for abdominal pain, nausea and vomiting.   Genitourinary: Positive for hematuria.   Musculoskeletal: Positive for back pain. Negative for neck pain.   Skin: Negative for rash.   Neurological: Negative for headaches.   Psychiatric/Behavioral: Negative.    All other systems reviewed and are negative.      Physical Exam   BP: (!) 152/79  Pulse: 71  Temp: 98.6  F (37  C)  Resp: 16  Height: 190.5 cm (6' 3\")  Weight: 91.5 kg (201 lb 12.8 oz)  SpO2: 97 %      Physical Exam  Physical Exam   Constitutional:   well nourished, well developed, resting comfortably   HENT:   Head: Normocephalic and atraumatic.   Eyes: Conjunctivae are normal. Pupils are equal, round, and reactive to light.    pharynx has no erythema or exudate, mucous membranes are moist  Neck:   no adenopathy, no bony tenderness  Cardiovascular: regular rate and rhythm without murmurs or gallops  Pulmonary/Chest: Clear to auscultation bilaterally, with no " wheezes or retractions. No respiratory distress.  GI: Soft with good bowel sounds.  Non-tender, non-distended, with no guarding, no rebound, no peritoneal signs.   Back:  No bony or CVA tenderness   Musculoskeletal:  no edema   Skin: Skin is warm and dry. No rash noted.   Neurological: alert and oriented to person, place, and time. Nonfocal exam  Psychiatric:  normal mood and affect.   ED Course        Procedures             Critical Care time:  none             Labs Ordered and Resulted from Time of ED Arrival Up to the Time of Departure from the ED   UA MACROSCOPIC WITH REFLEX TO MICRO AND CULTURE - Abnormal; Notable for the following components:       Result Value    Blood Urine Moderate (*)     Protein Albumin Urine 10 (*)     RBC Urine >182 (*)     Bacteria Urine Few (*)     Mucous Urine Present (*)     All other components within normal limits   CBC WITH PLATELETS DIFFERENTIAL   COMPREHENSIVE METABOLIC PANEL   URINE CULTURE AEROBIC BACTERIAL     Results for orders placed or performed during the hospital encounter of 01/10/20   UA reflex to Microscopic and Culture     Status: Abnormal   Result Value Ref Range    Color Urine Yellow     Appearance Urine Slightly Cloudy     Glucose Urine Negative NEG^Negative mg/dL    Bilirubin Urine Negative NEG^Negative    Ketones Urine Negative NEG^Negative mg/dL    Specific Gravity Urine 1.010 1.003 - 1.035    Blood Urine Moderate (A) NEG^Negative    pH Urine 5.0 5.0 - 7.0 pH    Protein Albumin Urine 10 (A) NEG^Negative mg/dL    Urobilinogen mg/dL Normal 0.0 - 2.0 mg/dL    Nitrite Urine Negative NEG^Negative    Leukocyte Esterase Urine Negative NEG^Negative    Source Midstream Urine     RBC Urine >182 (H) 0 - 2 /HPF    WBC Urine 3 0 - 5 /HPF    Bacteria Urine Few (A) NEG^Negative /HPF    Squamous Epithelial /HPF Urine 1 0 - 1 /HPF    Mucous Urine Present (A) NEG^Negative /LPF   CBC with platelets differential     Status: None   Result Value Ref Range    WBC 5.8 4.0 - 11.0  10e9/L    RBC Count 5.25 4.4 - 5.9 10e12/L    Hemoglobin 15.2 13.3 - 17.7 g/dL    Hematocrit 47.1 40.0 - 53.0 %    MCV 90 78 - 100 fl    MCH 29.0 26.5 - 33.0 pg    MCHC 32.3 31.5 - 36.5 g/dL    RDW 13.6 10.0 - 15.0 %    Platelet Count 334 150 - 450 10e9/L    Diff Method Automated Method     % Neutrophils 72.2 %    % Lymphocytes 18.9 %    % Monocytes 7.2 %    % Eosinophils 1.0 %    % Basophils 0.5 %    % Immature Granulocytes 0.2 %    Nucleated RBCs 0 0 /100    Absolute Neutrophil 4.2 1.6 - 8.3 10e9/L    Absolute Lymphocytes 1.1 0.8 - 5.3 10e9/L    Absolute Monocytes 0.4 0.0 - 1.3 10e9/L    Absolute Eosinophils 0.1 0.0 - 0.7 10e9/L    Absolute Basophils 0.0 0.0 - 0.2 10e9/L    Abs Immature Granulocytes 0.0 0 - 0.4 10e9/L    Absolute Nucleated RBC 0.0    Comprehensive metabolic panel     Status: None   Result Value Ref Range    Sodium 140 133 - 144 mmol/L    Potassium 3.5 3.4 - 5.3 mmol/L    Chloride 108 94 - 109 mmol/L    Carbon Dioxide 28 20 - 32 mmol/L    Anion Gap 5 3 - 14 mmol/L    Glucose 93 70 - 99 mg/dL    Urea Nitrogen 17 7 - 30 mg/dL    Creatinine 0.91 0.66 - 1.25 mg/dL    GFR Estimate >90 >60 mL/min/[1.73_m2]    GFR Estimate If Black >90 >60 mL/min/[1.73_m2]    Calcium 8.8 8.5 - 10.1 mg/dL    Bilirubin Total 0.5 0.2 - 1.3 mg/dL    Albumin 4.1 3.4 - 5.0 g/dL    Protein Total 7.8 6.8 - 8.8 g/dL    Alkaline Phosphatase 124 40 - 150 U/L    ALT 33 0 - 70 U/L    AST 17 0 - 45 U/L     Abdominal and pelvic CT:  FINDINGS:     Abdomen and pelvis: 7 x 10 x 7 mm ovoid stone in the right renal  pelvis with minimal peripelvic fat stranding/urothelial thickening.  Additional tiny nonobstructing calyceal stones in the lower pole of  the right kidney and upper, mid, and lower pole of the left kidney. A  few fluid attenuation cyst in the left renal cortex are similar to  prior. Intermediate attenuation exophytic lesion arising from the  anterior upper pole of the left kidney corresponds to a nonenhancing  cyst on comparison  CT from 3/7/2016, likely reflecting interval  hemorrhage/protein accumulation. No hydronephrosis or hydroureter. The  bladder is unremarkable. Mildly enlarged prostate. Symmetric seminal  vesicles.     Innumerable hypodensities scattered throughout the hepatic parenchyma,  not significantly changed since prior and compatible with cysts.  Contracted gallbladder. No intra or extrahepatic biliary dilation. The  liver, spleen, and adrenal glands are unremarkable. No free fluid or  free air. No bowel obstruction or inflammation. Mild aortoiliac  atherosclerotic calcification without aneurysmal dilation. No  abdominal or pelvic lymphadenopathy. Small hiatal hernia.     Lung bases:  Clear.     Bones and soft tissues: No acute or aggressive osseus abnormality.  Lower lumbar predominant facet arthropathy.                                                                      IMPRESSION:   1. 7 x 10 x 7 mm stone in the right renal pelvis without associated  hydronephrosis.  2. Additional tiny bilateral nonobstructing calyceal stones.  3. No significant change in innumerable hepatic cysts.           Assessments & Plan (with Medical Decision Making)       I have reviewed the nursing notes.  Emergency Department course:  The patient was seen and examined at 1258 pm in triage.  Laboratory studies show an unremarkable CBC and comprehensive metabolic panel.  UA has greater than 182 RBCs with a few bacteria.  CT of the abdomen and pelvis shows   IMPRESSION:   1. 7 x 10 x 7 mm stone in the right renal pelvis without associated  hydronephrosis.  2. Additional tiny bilateral nonobstructing calyceal stones.  3. No significant change in innumerable hepatic cysts.    The patient is a 59-year-old male who presents with hematuria and low back pain.  He does have a stone in the right renal pelvis but has no associated hydronephrosis.  The patient himself states his pain does not feel like his past kidney stone pain.  The urine will be sent for  culture.  I prescribed Macrobid that the patient can use for possible hemorrhagic cystitis.  I would like him to follow-up with the urology clinic in 1 to 2 weeks for reevaluation.  I counseled the patient in my usual and standard fashion for hematuria and reasons to return to the Emergency Department.       .   I have reviewed the findings, diagnosis, plan and need for follow up with the patient.    New Prescriptions    NITROFURANTOIN MACROCRYSTAL-MONOHYDRATE (MACROBID) 100 MG CAPSULE    Take 1 capsule (100 mg) by mouth 2 times daily       Final diagnoses:   Gross hematuria     This note was created in part by the use of Dragon voice recognition dictation system. Inadvertent grammatical errors and typographical errors may still exist.  Marry Chanel MD    1/10/2020   [facility], [place], EMERGENCY DEPARTMENT     Marry Chanel MD  01/10/20 1951

## 2020-01-10 NOTE — ED NOTES
"  ED Triage Provider Note  [facility]  Encounter Date: Fausto 10, 2020  1:07 PM     History:  Chief Complaint   Patient presents with     Hematuria     Freeman Velazquez is a 59 year old male who presents with bilateral low back pain and blood in his urine.  Patient has low back pain on both sides and a burning sensation across his lower abdomen.  Patient also noted blood in his urine that started yesterday.  He does have a history of an enlarged prostate but does not have difficulty with urinary retention.  He states he has urinated blood 3 times today.  Patient states he has a history of kidney stones but this does not feel like his usual kidney stones.  He denies fevers or falls.  He denies paresthesias and has no radicular symptoms    Social: Here with wife      Review of Systems:  Review of Systems   Constitutional: Negative for fever.   Eyes: Negative.    Respiratory: Negative for shortness of breath.    Cardiovascular: Negative for chest pain.   Gastrointestinal: Positive for abdominal pain. Negative for nausea and vomiting.        Burning abdl pain   Genitourinary: Negative.    Musculoskeletal: Positive for back pain. Negative for neck pain.   Neurological: Negative for headaches.   Psychiatric/Behavioral: Negative.    All other systems reviewed and are negative.        Exam:  BP (!) 152/79   Pulse 71   Resp 16   Ht 1.905 m (6' 3\")   Wt 91.5 kg (201 lb 12.8 oz)   SpO2 97%   BMI 25.22 kg/m   General: No acute distress. Appears stated age.   Cardio: Regular rate, extremities well perfused  Resp: Normal work of breathing, grossly normal respiratory rate  Neuro: Alert. Grossly intact strength. Ambulates without difficulty. Abdomen: Soft with good bowel sounds, suprapubic tenderness, no guarding or rebound   Back: No CVA tenderness, no bony tenderness.    Medical Decision Making: .  Emergency Department course:  The patient was seen and examined at 1258 pm in triage. Patient " arriving to the ED with problem as above. A medical screening exam was performed.  Laboratory and CT orders initiated from Triage. The patient is appropriate to wait in triage.  Marry Chanel MD       Note created by Marry Chanel MD on 1/10/2020 at 1:07 PM     Marry Chanel MD  01/10/20 4410

## 2020-01-10 NOTE — ED AVS SNAPSHOT
[facility], [place], Emergency Department  [address] [place] [id number]  Phone:  [phone]                                    Freeman Velazquez   MRN: [id number]    Department:  [facility], Emergency Department   Date of Visit:  1/10/2020           After Visit Summary Signature Page    I have received my discharge instructions, and my questions have been answered. I have discussed any challenges I see with this plan with the nurse or doctor.    ..........................................................................................................................................  Patient/Patient Representative Signature      ..........................................................................................................................................  Patient Representative Print Name and Relationship to Patient    ..................................................               ................................................  Date                                   Time    ..........................................................................................................................................  Reviewed by Signature/Title    ...................................................              ..............................................  Date                                               Time          22EPIC Rev 08/18

## 2020-01-11 LAB
BACTERIA SPEC CULT: NO GROWTH
Lab: NORMAL
SPECIMEN SOURCE: NORMAL

## 2020-01-11 NOTE — RESULT ENCOUNTER NOTE
Emergency Dept/Urgent Care discharge antibiotic (if prescribed): Nitrofurantoin Macrocrystal-Monohydrate (Macrobid) 100 mg PO capsule, 1 capsule (100 mg) by mouth 2 times daily for 10 days  Date of Rx (if applicable):  1/10/2020  No changes in treatment per Urine culture protocol.

## 2020-01-12 NOTE — RESULT ENCOUNTER NOTE
Final urine culture report is NEGATIVE per [facility] ED Lab Result protocol.    If NEGATIVE result, no change in treatment, per [facility] ED Lab Result protocol.

## 2020-01-14 ENCOUNTER — AMBULATORY - HEALTHEAST (OUTPATIENT)
Dept: UROLOGY | Facility: CLINIC | Age: 60
End: 2020-01-14

## 2020-01-14 ENCOUNTER — OFFICE VISIT - HEALTHEAST (OUTPATIENT)
Dept: UROLOGY | Facility: CLINIC | Age: 60
End: 2020-01-14

## 2020-01-14 DIAGNOSIS — N20.0 CALCULUS OF KIDNEY: ICD-10-CM

## 2020-01-14 LAB
ALBUMIN UR-MCNC: NEGATIVE MG/DL
APPEARANCE UR: CLEAR
BILIRUB UR QL STRIP: ABNORMAL
COLOR UR AUTO: ABNORMAL
GLUCOSE UR STRIP-MCNC: NEGATIVE MG/DL
HGB UR QL STRIP: ABNORMAL
KETONES UR STRIP-MCNC: NEGATIVE MG/DL
LEUKOCYTE ESTERASE UR QL STRIP: NEGATIVE
NITRATE UR QL: NEGATIVE
PH UR STRIP: 5 [PH] (ref 5–8)
SP GR UR STRIP: >=1.03 (ref 1–1.03)
UROBILINOGEN UR STRIP-ACNC: ABNORMAL

## 2020-01-21 ENCOUNTER — SURGERY - HEALTHEAST (OUTPATIENT)
Dept: SURGERY | Facility: CLINIC | Age: 60
End: 2020-01-21

## 2020-01-21 ENCOUNTER — ANESTHESIA - HEALTHEAST (OUTPATIENT)
Dept: SURGERY | Facility: CLINIC | Age: 60
End: 2020-01-21

## 2020-01-24 ENCOUNTER — COMMUNICATION - HEALTHEAST (OUTPATIENT)
Dept: SCHEDULING | Facility: CLINIC | Age: 60
End: 2020-01-24

## 2020-01-24 ENCOUNTER — SURGERY - HEALTHEAST (OUTPATIENT)
Dept: UROLOGY | Facility: CLINIC | Age: 60
End: 2020-01-24

## 2020-01-24 DIAGNOSIS — N20.0 CALCULUS OF KIDNEY: ICD-10-CM

## 2020-01-27 ENCOUNTER — COMMUNICATION - HEALTHEAST (OUTPATIENT)
Dept: UROLOGY | Facility: CLINIC | Age: 60
End: 2020-01-27

## 2020-02-04 ENCOUNTER — SURGERY - HEALTHEAST (OUTPATIENT)
Dept: SURGERY | Facility: CLINIC | Age: 60
End: 2020-02-04

## 2020-02-04 ENCOUNTER — ANESTHESIA - HEALTHEAST (OUTPATIENT)
Dept: SURGERY | Facility: CLINIC | Age: 60
End: 2020-02-04

## 2020-02-04 ENCOUNTER — TRANSFERRED RECORDS (OUTPATIENT)
Dept: HEALTH INFORMATION MANAGEMENT | Facility: CLINIC | Age: 60
End: 2020-02-04

## 2020-02-04 ASSESSMENT — MIFFLIN-ST. JEOR: SCORE: 1756.07

## 2020-02-10 ENCOUNTER — AMBULATORY - HEALTHEAST (OUTPATIENT)
Dept: UROLOGY | Facility: CLINIC | Age: 60
End: 2020-02-10

## 2020-02-10 DIAGNOSIS — N20.0 CALCULUS OF KIDNEY: ICD-10-CM

## 2020-02-10 LAB
ALBUMIN UR-MCNC: ABNORMAL MG/DL
APPEARANCE UR: ABNORMAL
BILIRUB UR QL STRIP: ABNORMAL
COLOR UR AUTO: ABNORMAL
GLUCOSE UR STRIP-MCNC: NEGATIVE MG/DL
HGB UR QL STRIP: ABNORMAL
KETONES UR STRIP-MCNC: ABNORMAL MG/DL
LEUKOCYTE ESTERASE UR QL STRIP: ABNORMAL
NITRATE UR QL: POSITIVE
PH UR STRIP: 5 [PH] (ref 5–8)
SP GR UR STRIP: >=1.03 (ref 1–1.03)
UROBILINOGEN UR STRIP-ACNC: ABNORMAL

## 2020-02-11 LAB — BACTERIA SPEC CULT: NO GROWTH

## 2020-03-10 ENCOUNTER — OFFICE VISIT - HEALTHEAST (OUTPATIENT)
Dept: UROLOGY | Facility: CLINIC | Age: 60
End: 2020-03-10

## 2020-03-10 ENCOUNTER — TRANSFERRED RECORDS (OUTPATIENT)
Dept: HEALTH INFORMATION MANAGEMENT | Facility: CLINIC | Age: 60
End: 2020-03-10

## 2020-03-10 ENCOUNTER — HOSPITAL ENCOUNTER (OUTPATIENT)
Dept: CT IMAGING | Facility: CLINIC | Age: 60
Discharge: HOME | End: 2020-03-10
Attending: UROLOGY

## 2020-03-10 DIAGNOSIS — N20.0 CALCULUS OF KIDNEY: ICD-10-CM

## 2020-03-10 DIAGNOSIS — N23 RENAL COLIC: ICD-10-CM

## 2020-03-10 DIAGNOSIS — N20.1 CALCULUS OF URETER: ICD-10-CM

## 2020-03-10 LAB
ALBUMIN UR-MCNC: ABNORMAL MG/DL
APPEARANCE UR: ABNORMAL
BILIRUB UR QL STRIP: NEGATIVE
COLOR UR AUTO: ABNORMAL
GLUCOSE UR STRIP-MCNC: NEGATIVE MG/DL
HGB UR QL STRIP: ABNORMAL
KETONES UR STRIP-MCNC: NEGATIVE MG/DL
LEUKOCYTE ESTERASE UR QL STRIP: NEGATIVE
NITRATE UR QL: NEGATIVE
PH UR STRIP: 6 [PH] (ref 5–8)
SP GR UR STRIP: 1.02 (ref 1–1.03)
UROBILINOGEN UR STRIP-ACNC: ABNORMAL

## 2020-03-13 ENCOUNTER — SURGERY - HEALTHEAST (OUTPATIENT)
Dept: SURGERY | Facility: CLINIC | Age: 60
End: 2020-03-13

## 2020-03-13 ENCOUNTER — ANESTHESIA - HEALTHEAST (OUTPATIENT)
Dept: SURGERY | Facility: CLINIC | Age: 60
End: 2020-03-13

## 2020-03-13 ASSESSMENT — MIFFLIN-ST. JEOR: SCORE: 1722.05

## 2020-03-16 ENCOUNTER — COMMUNICATION - HEALTHEAST (OUTPATIENT)
Dept: UROLOGY | Facility: CLINIC | Age: 60
End: 2020-03-16

## 2020-03-16 DIAGNOSIS — N20.1 CALCULUS OF URETER: ICD-10-CM

## 2020-03-16 DIAGNOSIS — R11.0 NAUSEA: ICD-10-CM

## 2020-03-19 ENCOUNTER — AMBULATORY - HEALTHEAST (OUTPATIENT)
Dept: UROLOGY | Facility: CLINIC | Age: 60
End: 2020-03-19

## 2020-03-19 DIAGNOSIS — N20.1 CALCULUS OF URETER: ICD-10-CM

## 2020-03-19 LAB
ALBUMIN UR-MCNC: ABNORMAL MG/DL
APPEARANCE UR: ABNORMAL
BILIRUB UR QL STRIP: ABNORMAL
COLOR UR AUTO: ABNORMAL
GLUCOSE UR STRIP-MCNC: NEGATIVE MG/DL
HGB UR QL STRIP: ABNORMAL
KETONES UR STRIP-MCNC: ABNORMAL MG/DL
LEUKOCYTE ESTERASE UR QL STRIP: ABNORMAL
NITRATE UR QL: NEGATIVE
PH UR STRIP: 7 [PH] (ref 5–8)
SP GR UR STRIP: >=1.03 (ref 1–1.03)
UROBILINOGEN UR STRIP-ACNC: ABNORMAL

## 2020-03-20 LAB — BACTERIA SPEC CULT: NO GROWTH

## 2020-04-13 ENCOUNTER — AMBULATORY - HEALTHEAST (OUTPATIENT)
Dept: UROLOGY | Facility: CLINIC | Age: 60
End: 2020-04-13

## 2020-04-13 DIAGNOSIS — N20.1 CALCULUS OF URETER: ICD-10-CM

## 2020-06-17 ENCOUNTER — HOSPITAL ENCOUNTER (OUTPATIENT)
Dept: CT IMAGING | Facility: CLINIC | Age: 60
Discharge: HOME | End: 2020-06-17
Attending: PHYSICIAN ASSISTANT

## 2020-06-17 DIAGNOSIS — N20.1 CALCULUS OF URETER: ICD-10-CM

## 2020-06-18 ENCOUNTER — OFFICE VISIT - HEALTHEAST (OUTPATIENT)
Dept: UROLOGY | Facility: CLINIC | Age: 60
End: 2020-06-18

## 2020-06-18 ENCOUNTER — TRANSFERRED RECORDS (OUTPATIENT)
Dept: HEALTH INFORMATION MANAGEMENT | Facility: CLINIC | Age: 60
End: 2020-06-18

## 2020-06-18 DIAGNOSIS — N20.0 CALCULUS OF KIDNEY: ICD-10-CM

## 2020-08-17 ENCOUNTER — OFFICE VISIT (OUTPATIENT)
Dept: ORTHOPEDICS | Facility: CLINIC | Age: 60
End: 2020-08-17
Payer: COMMERCIAL

## 2020-08-17 ENCOUNTER — ANCILLARY PROCEDURE (OUTPATIENT)
Dept: GENERAL RADIOLOGY | Facility: CLINIC | Age: 60
End: 2020-08-17
Attending: ORTHOPAEDIC SURGERY
Payer: COMMERCIAL

## 2020-08-17 VITALS — DIASTOLIC BLOOD PRESSURE: 76 MMHG | SYSTOLIC BLOOD PRESSURE: 124 MMHG | RESPIRATION RATE: 14 BRPM | HEART RATE: 61 BPM

## 2020-08-17 DIAGNOSIS — M17.0 OSTEOARTHRITIS OF BOTH KNEES, UNSPECIFIED OSTEOARTHRITIS TYPE: ICD-10-CM

## 2020-08-17 DIAGNOSIS — M17.0 OSTEOARTHRITIS OF BOTH KNEES, UNSPECIFIED OSTEOARTHRITIS TYPE: Primary | ICD-10-CM

## 2020-08-17 PROCEDURE — 20610 DRAIN/INJ JOINT/BURSA W/O US: CPT | Mod: LT | Performed by: ORTHOPAEDIC SURGERY

## 2020-08-17 PROCEDURE — 73562 X-RAY EXAM OF KNEE 3: CPT | Mod: LT

## 2020-08-17 PROCEDURE — 99203 OFFICE O/P NEW LOW 30 MIN: CPT | Mod: 25 | Performed by: ORTHOPAEDIC SURGERY

## 2020-08-17 RX ORDER — NAPROXEN 500 MG/1
500 TABLET ORAL 2 TIMES DAILY WITH MEALS
Qty: 60 TABLET | Refills: 11 | Status: SHIPPED | OUTPATIENT
Start: 2020-08-17 | End: 2021-03-08

## 2020-08-17 RX ORDER — METHYLPREDNISOLONE ACETATE 80 MG/ML
80 INJECTION, SUSPENSION INTRA-ARTICULAR; INTRALESIONAL; INTRAMUSCULAR; SOFT TISSUE
Status: SHIPPED | OUTPATIENT
Start: 2020-08-17

## 2020-08-17 RX ORDER — LIDOCAINE HYDROCHLORIDE 10 MG/ML
5 INJECTION, SOLUTION INFILTRATION; PERINEURAL
Status: SHIPPED | OUTPATIENT
Start: 2020-08-17

## 2020-08-17 RX ADMIN — LIDOCAINE HYDROCHLORIDE 5 ML: 10 INJECTION, SOLUTION INFILTRATION; PERINEURAL at 14:03

## 2020-08-17 RX ADMIN — METHYLPREDNISOLONE ACETATE 80 MG: 80 INJECTION, SUSPENSION INTRA-ARTICULAR; INTRALESIONAL; INTRAMUSCULAR; SOFT TISSUE at 14:03

## 2020-08-17 NOTE — PROGRESS NOTES
Large Joint Injection/Arthocentesis: L knee joint    Date/Time: 8/17/2020 2:03 PM  Performed by: Darren Jordan MD  Authorized by: Darren Jordan MD     Indications:  Pain  Needle Size:  22 G  Guidance: landmark guided    Location:  Knee      Medications:  80 mg methylPREDNISolone 80 MG/ML; 5 mL lidocaine 1 %  Outcome:  Tolerated well, no immediate complications  Procedure discussed: discussed risks, benefits, and alternatives    Consent Given by:  Patient  Timeout: timeout called immediately prior to procedure    Prep: patient was prepped and draped in usual sterile fashion

## 2020-08-17 NOTE — LETTER
8/17/2020         RE: Freeman Velazquez  [address]  [facility] [address]        Dear Colleague,    Thank you for referring your patient, Freeman Velazquez, to the [facility]. Please see a copy of my visit note below.      Large Joint Injection/Arthocentesis: L knee joint    Date/Time: 8/17/2020 2:03 PM  Performed by: Darren Jordan MD  Authorized by: Darren Jordan MD     Indications:  Pain  Needle Size:  22 G  Guidance: landmark guided    Location:  Knee      Medications:  80 mg methylPREDNISolone 80 MG/ML; 5 mL lidocaine 1 %  Outcome:  Tolerated well, no immediate complications  Procedure discussed: discussed risks, benefits, and alternatives    Consent Given by:  Patient  Timeout: timeout called immediately prior to procedure    Prep: patient was prepped and draped in usual sterile fashion            Freeman Velazquez is a 60 year old male who is seen as self referral for bilateral knee pain, left > right.  He has had knee pain for many years.  He has been very active in sports since high school.  He reports multiple injuries.  He had arthroscopy on the left knee in 1986 in 1989.  He had arthroscopy on the right knee in 1989, 1990, 1991.  He has had Synvisc injection performed of the knees in 2013, but did not feel it helped.  He has taken ibuprofen and naproxen previously.  He does alternate between the 2 to keep both dosages down.  In the past few months he has noted increased left knee pain and loss of flexibility and bending.  He describes a constant pain across the front of the knee rated 7 out of 10.  Pain is sometimes sharp stabbing pain and always aching.  It is worse with walking, stairs, pivoting.  He is still active in tennis, golfing, several other sports.    He has had x-rays of both knees today.  Images reviewed with the patient.  There is definite medial joint narrowing on both knees.  He is not quite bone-on-bone on either knee.    Past  Medical History:   Diagnosis Date     Arthritis      Distal radius fracture 7/20/2011     Renal colic        Past Surgical History:   Procedure Laterality Date     ARTHROSCOPY KNEE BILATERAL       COLONOSCOPY  4/16/2013    Procedure: COLONOSCOPY;;  Surgeon: Lewis Metcalf MD;  Location: [facility] OR     COLONOSCOPY N/A 1/24/2019    Procedure: Combined Colonoscopy, Single Or Multiple Biopsy/Polypectomy By Biopsy;  Surgeon: Javier Judge MD;  Location: [facility] OR     COLONOSCOPY WITH CO2 INSUFFLATION N/A 1/24/2019    Procedure: COLONOSCOPY WITH CO2 INSUFFLATION;  Surgeon: Javier Judge MD;  Location: [facility] OR       Family History   Problem Relation Age of Onset     Breast Cancer Mother      Allergies Mother      Anesthesia Reaction Mother      Arthritis Mother      Blood Disease Mother      Cardiovascular Father      Eye Disorder Father      Heart Disease Father      Cancer Father         multiple skin cancers     Macular Degeneration Father      Glaucoma Father      Diabetes Sister      Diabetes Maternal Grandmother      Thyroid Disease No family hx of      Cerebrovascular Disease No family hx of      Hypertension No family hx of        Social History     Socioeconomic History     Marital status:      Spouse name: Not on file     Number of children: Not on file     Years of education: Not on file     Highest education level: Not on file   Occupational History     Employer: [employer]   Social Needs     Financial resource strain: Not on file     Food insecurity     Worry: Not on file     Inability: Not on file     Transportation needs     Medical: Not on file     Non-medical: Not on file   Tobacco Use     Smoking status: Never Smoker     Smokeless tobacco: Never Used   Substance and Sexual Activity     Alcohol use: Yes     Comment: rare, one drink in 6 months     Drug use: No     Sexual activity: Not Currently     Partners: Female     Comment: many years   Lifestyle     Physical  activity     Days per week: Not on file     Minutes per session: Not on file     Stress: Not on file   Relationships     Social connections     Talks on phone: Not on file     Gets together: Not on file     Attends Yazdanism service: Not on file     Active member of club or organization: Not on file     Attends meetings of clubs or organizations: Not on file     Relationship status: Not on file     Intimate partner violence     Fear of current or ex partner: Not on file     Emotionally abused: Not on file     Physically abused: Not on file     Forced sexual activity: Not on file   Other Topics Concern     Parent/sibling w/ CABG, MI or angioplasty before 65F 55M? No   Social History Narrative     Not on file       Current Outpatient Medications   Medication Sig Dispense Refill     naproxen (NAPROSYN) 500 MG tablet Take 1 tablet (500 mg) by mouth 2 times daily (with meals) 60 tablet 11     IBUPROFEN PO        Naproxen Sodium (ALEVE PO) Take by mouth as needed for moderate pain       nitroFURantoin macrocrystal-monohydrate (MACROBID) 100 MG capsule Take 1 capsule (100 mg) by mouth 2 times daily 20 capsule 0     ranitidine (ZANTAC) 150 MG tablet Take 1 tablet (150 mg) by mouth 2 times daily 60 tablet 1       Allergies   Allergen Reactions     Tetracycline Shortness Of Breath     Erythromycin        REVIEW OF SYSTEMS:  CONSTITUTIONAL:  NEGATIVE for fever, chills, change in weight, not feeling tired  SKIN:  NEGATIVE for worrisome rashes, no skin lumps, no skin ulcers and no non-healing wounds  EYES:  NEGATIVE for vision changes or irritation.  ENT/MOUTH:  NEGATIVE.  No hearing loss, no hoarseness, no difficulty swallowing.  RESP:  NEGATIVE. No cough or shortness of breath.  CV:  NEGATIVE for chest pain, palpitations or peripheral edema  GI:  NEGATIVE for nausea, abdominal pain, heartburn, or change in bowel habits  :  Negative. No dysuria, no hematuria  MUSCULOSKELETAL:  See HPI above  NEURO:  NEGATIVE . No headaches,  no dizziness,  no numbness  ENDOCRINE:  NEGATIVE for temperature intolerance, skin/hair changes  HEME/ALLERGY/IMMUNE:  NEGATIVE for bleeding problems  PSYCHIATRIC:  NEGATIVE. no anxiety, no depression.      Exam:  Vitals: /76   Pulse 61   Resp 14   BMI= There is no height or weight on file to calculate BMI.  Constitutional:  healthy, alert and no distress  Neuro: Alert and Oriented x 3, Sensation grossly WNL.  HEENT:  Atraumatic, EOMI  Neck:  Neck supple with no tenderness.  Psych: Affect normal   Respiratory: Breathing not labored.  Cardiovascular: normal peripheral pulses  Lymph: no adenopathy  Skin: No rashes,worrisome lesions or skin problems  Spine: straight, no straight leg raising pain.  Hips show full range of motion.  There is no tenderness over the sacro-iliac joints, sciatic notch, or greater trochanters.   He has motion on the right knee of 0 to 130 degrees.  On the left knee he has motion from 0 to 105 degrees.  He has medial and lateral joint line tenderness on the left knee, mostly medial.  He has no ligamentous laxity of MCL, LCL, cruciates.  He has no effusions.  He has pain with attempted flexion on the left knee which limits Juan test.  He had negative Juan test on the right.  No warmth or erythema.  Sensation, motor and circulation are intact.    Assessment:   Bilateral knee osteoarthritis, left > right.  He has stiffness of the left knee especially in flexion.    Plan:  Naproxen 500 mg twice daily.   He  desires injection today of left knee(s).  Risks, benefits, potential complications and alternatives were discussed.   With the patient's consent, sterile prep was performed of left knee(s).  Left knee was injected with Depo Medrol 80 mg and lidocaine at anterolateral site.  PT for range of motion to the left knee, strengthening of both knees.  Return to clinic as needed.  He may be a candidate for knee replacement in the future, but we would want to try conservative treatment and  regain motion of the left knee.      Again, thank you for allowing me to participate in the care of your patient.        Sincerely,        Darren Jordan MD

## 2020-08-17 NOTE — PATIENT INSTRUCTIONS
You have had a steroid injection today.  For the first 2 hours there will likely be some numbing in the joint from the lidocaine.  This is a good sign, indicating that the injection is in the right place.  In 2 hours the lidocaine will wear off, and the joint will hurt like you had a shot.  Each day the cortisone makes it feel better.  It reaches peak effect in 2 weeks.  We expect it to last for 3 months.  You may resume regular activity when you feel ready.  If you are diabetic, your glucoses will be quite high for several days.    Naproxen 500 mg twice daily.

## 2020-08-17 NOTE — PROGRESS NOTES
Freeman Velazquez is a 60 year old male who is seen as self referral for bilateral knee pain, left > right.  He has had knee pain for many years.  He has been very active in sports since high school.  He reports multiple injuries.  He had arthroscopy on the left knee in 1986 in 1989.  He had arthroscopy on the right knee in 1989, 1990, 1991.  He has had Synvisc injection performed of the knees in 2013, but did not feel it helped.  He has taken ibuprofen and naproxen previously.  He does alternate between the 2 to keep both dosages down.  In the past few months he has noted increased left knee pain and loss of flexibility and bending.  He describes a constant pain across the front of the knee rated 7 out of 10.  Pain is sometimes sharp stabbing pain and always aching.  It is worse with walking, stairs, pivoting.  He is still active in tennis, golfing, several other sports.    He has had x-rays of both knees today.  Images reviewed with the patient.  There is definite medial joint narrowing on both knees.  He is not quite bone-on-bone on either knee.    Past Medical History:   Diagnosis Date     Arthritis      Distal radius fracture 7/20/2011     Renal colic        Past Surgical History:   Procedure Laterality Date     ARTHROSCOPY KNEE BILATERAL       COLONOSCOPY  4/16/2013    Procedure: COLONOSCOPY;;  Surgeon: Lewis Metcalf MD;  Location: [facility]     COLONOSCOPY N/A 1/24/2019    Procedure: Combined Colonoscopy, Single Or Multiple Biopsy/Polypectomy By Biopsy;  Surgeon: Javier Judge MD;  Location: [facility]     COLONOSCOPY WITH CO2 INSUFFLATION N/A 1/24/2019    Procedure: COLONOSCOPY WITH CO2 INSUFFLATION;  Surgeon: Javier Judge MD;  Location: [facility]       Family History   Problem Relation Age of Onset     Breast Cancer Mother      Allergies Mother      Anesthesia Reaction Mother      Arthritis Mother      Blood Disease Mother      Cardiovascular Father      Eye Disorder Father      Heart  Disease Father      Cancer Father         multiple skin cancers     Macular Degeneration Father      Glaucoma Father      Diabetes Sister      Diabetes Maternal Grandmother      Thyroid Disease No family hx of      Cerebrovascular Disease No family hx of      Hypertension No family hx of        Social History     Socioeconomic History     Marital status:      Spouse name: Not on file     Number of children: Not on file     Years of education: Not on file     Highest education level: Not on file   Occupational History     Employer: [employer]   Social Needs     Financial resource strain: Not on file     Food insecurity     Worry: Not on file     Inability: Not on file     Transportation needs     Medical: Not on file     Non-medical: Not on file   Tobacco Use     Smoking status: Never Smoker     Smokeless tobacco: Never Used   Substance and Sexual Activity     Alcohol use: Yes     Comment: rare, one drink in 6 months     Drug use: No     Sexual activity: Not Currently     Partners: Female     Comment: many years   Lifestyle     Physical activity     Days per week: Not on file     Minutes per session: Not on file     Stress: Not on file   Relationships     Social connections     Talks on phone: Not on file     Gets together: Not on file     Attends [employer] service: Not on file     Active member of club or organization: Not on file     Attends meetings of clubs or organizations: Not on file     Relationship status: Not on file     Intimate partner violence     Fear of current or ex partner: Not on file     Emotionally abused: Not on file     Physically abused: Not on file     Forced sexual activity: Not on file   Other Topics Concern     Parent/sibling w/ CABG, MI or angioplasty before 65F 55M? No   Social History Narrative     Not on file       Current Outpatient Medications   Medication Sig Dispense Refill     naproxen (NAPROSYN) 500 MG tablet Take 1 tablet (500 mg) by mouth 2 times daily  (with meals) 60 tablet 11     IBUPROFEN PO        Naproxen Sodium (ALEVE PO) Take by mouth as needed for moderate pain       nitroFURantoin macrocrystal-monohydrate (MACROBID) 100 MG capsule Take 1 capsule (100 mg) by mouth 2 times daily 20 capsule 0     ranitidine (ZANTAC) 150 MG tablet Take 1 tablet (150 mg) by mouth 2 times daily 60 tablet 1       Allergies   Allergen Reactions     Tetracycline Shortness Of Breath     Erythromycin        REVIEW OF SYSTEMS:  CONSTITUTIONAL:  NEGATIVE for fever, chills, change in weight, not feeling tired  SKIN:  NEGATIVE for worrisome rashes, no skin lumps, no skin ulcers and no non-healing wounds  EYES:  NEGATIVE for vision changes or irritation.  ENT/MOUTH:  NEGATIVE.  No hearing loss, no hoarseness, no difficulty swallowing.  RESP:  NEGATIVE. No cough or shortness of breath.  CV:  NEGATIVE for chest pain, palpitations or peripheral edema  GI:  NEGATIVE for nausea, abdominal pain, heartburn, or change in bowel habits  :  Negative. No dysuria, no hematuria  MUSCULOSKELETAL:  See HPI above  NEURO:  NEGATIVE . No headaches, no dizziness,  no numbness  ENDOCRINE:  NEGATIVE for temperature intolerance, skin/hair changes  HEME/ALLERGY/IMMUNE:  NEGATIVE for bleeding problems  PSYCHIATRIC:  NEGATIVE. no anxiety, no depression.      Exam:  Vitals: /76   Pulse 61   Resp 14   BMI= There is no height or weight on file to calculate BMI.  Constitutional:  healthy, alert and no distress  Neuro: Alert and Oriented x 3, Sensation grossly WNL.  HEENT:  Atraumatic, EOMI  Neck:  Neck supple with no tenderness.  Psych: Affect normal   Respiratory: Breathing not labored.  Cardiovascular: normal peripheral pulses  Lymph: no adenopathy  Skin: No rashes,worrisome lesions or skin problems  Spine: straight, no straight leg raising pain.  Hips show full range of motion.  There is no tenderness over the sacro-iliac joints, sciatic notch, or greater trochanters.   He has motion on the right knee of  0 to 130 degrees.  On the left knee he has motion from 0 to 105 degrees.  He has medial and lateral joint line tenderness on the left knee, mostly medial.  He has no ligamentous laxity of MCL, LCL, cruciates.  He has no effusions.  He has pain with attempted flexion on the left knee which limits Juan test.  He had negative Juan test on the right.  No warmth or erythema.  Sensation, motor and circulation are intact.    Assessment:   Bilateral knee osteoarthritis, left > right.  He has stiffness of the left knee especially in flexion.    Plan:  Naproxen 500 mg twice daily.   He  desires injection today of left knee(s).  Risks, benefits, potential complications and alternatives were discussed.   With the patient's consent, sterile prep was performed of left knee(s).  Left knee was injected with Depo Medrol 80 mg and lidocaine at anterolateral site.  PT for range of motion to the left knee, strengthening of both knees.  Return to clinic as needed.  He may be a candidate for knee replacement in the future, but we would want to try conservative treatment and regain motion of the left knee.

## 2020-08-26 ENCOUNTER — THERAPY VISIT (OUTPATIENT)
Dept: PHYSICAL THERAPY | Facility: CLINIC | Age: 60
End: 2020-08-26
Attending: ORTHOPAEDIC SURGERY
Payer: COMMERCIAL

## 2020-08-26 DIAGNOSIS — M17.0 OSTEOARTHRITIS OF BOTH KNEES, UNSPECIFIED OSTEOARTHRITIS TYPE: ICD-10-CM

## 2020-08-26 PROCEDURE — 97110 THERAPEUTIC EXERCISES: CPT | Mod: GP | Performed by: PHYSICAL THERAPIST

## 2020-08-26 PROCEDURE — 97161 PT EVAL LOW COMPLEX 20 MIN: CPT | Mod: GP | Performed by: PHYSICAL THERAPIST

## 2020-08-26 ASSESSMENT — ACTIVITIES OF DAILY LIVING (ADL)
SQUAT: ACTIVITY IS MINIMALLY DIFFICULT
GO DOWN STAIRS: ACTIVITY IS MINIMALLY DIFFICULT
HOW_WOULD_YOU_RATE_THE_OVERALL_FUNCTION_OF_YOUR_KNEE_DURING_YOUR_USUAL_DAILY_ACTIVITIES?: NEARLY NORMAL
WEAKNESS: I HAVE THE SYMPTOM BUT IT DOES NOT AFFECT MY ACTIVITY
AS_A_RESULT_OF_YOUR_KNEE_INJURY,_HOW_WOULD_YOU_RATE_YOUR_CURRENT_LEVEL_OF_DAILY_ACTIVITY?: NEARLY NORMAL
RAW_SCORE: 52
KNEEL ON THE FRONT OF YOUR KNEE: ACTIVITY IS FAIRLY DIFFICULT
GO UP STAIRS: ACTIVITY IS MINIMALLY DIFFICULT
SWELLING: THE SYMPTOM AFFECTS MY ACTIVITY SLIGHTLY
SIT WITH YOUR KNEE BENT: ACTIVITY IS MINIMALLY DIFFICULT
WALK: ACTIVITY IS NOT DIFFICULT
GIVING WAY, BUCKLING OR SHIFTING OF KNEE: I HAVE THE SYMPTOM BUT IT DOES NOT AFFECT MY ACTIVITY
RISE FROM A CHAIR: ACTIVITY IS MINIMALLY DIFFICULT
KNEE_ACTIVITY_OF_DAILY_LIVING_SUM: 52
PAIN: THE SYMPTOM AFFECTS MY ACTIVITY SLIGHTLY
STAND: ACTIVITY IS MINIMALLY DIFFICULT
LIMPING: I HAVE THE SYMPTOM BUT IT DOES NOT AFFECT MY ACTIVITY
STIFFNESS: THE SYMPTOM AFFECTS MY ACTIVITY SLIGHTLY
HOW_WOULD_YOU_RATE_THE_CURRENT_FUNCTION_OF_YOUR_KNEE_DURING_YOUR_USUAL_DAILY_ACTIVITIES_ON_A_SCALE_FROM_0_TO_100_WITH_100_BEING_YOUR_LEVEL_OF_KNEE_FUNCTION_PRIOR_TO_YOUR_INJURY_AND_0_BEING_THE_INABILITY_TO_PERFORM_ANY_OF_YOUR_USUAL_DAILY_ACTIVITIES?: 70
KNEE_ACTIVITY_OF_DAILY_LIVING_SCORE: 74.29

## 2020-08-26 NOTE — LETTER
"[facility]  [address]  [address]  [place] [address]  [phone]    2020    Re: Freeman Velazquez   :   1960  MRN:  [id number]   REFERRING PHYSICIAN:   MD JED Omalley FRIREYES PT  Date of Initial Evaluation:  2020  Visits:  Rxs Used: 1  Reason for Referral:  Osteoarthritis of both knees, unspecified osteoarthritis type    EVALUATION SUMMARY    [facility] Initial Evaluation  Subjective:  The history is provided by the patient.   Patient Health History  Freeman Velazquez being seen for L knee PT.   Date of Onset:  - MD referral 20.   Problem occurred: Multiple knee injuries   Pain is reported as 2/10 on pain scale.  Pertinent medical history includes: osteoarthritis.   Red flags:  None as reported by patient.  Other surgery history details: 3 arthroscopies in , , .    Current medications:  Anti-inflammatory.    Current occupation  [employer].   Primary job tasks include:  Computer work.   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                Therapist Generated HPI Evaluation  Problem details: MD order for range of motion, strengthening. Pre-morbid activities: very active with tennis, golf (uses cart), baseball with family, table tennis. Hasn't bowled in 2 years.     Had steroid injection on 2020 and notices a substantial change. \"Has not felt this good in years.\" Noticing more range of motion and less pain, more strength too. .         Type of problem:  Bilateral knees.    This is a chronic condition.    Associated symptoms:  Buckling/giving out, loss of motion/stiffness and loss of strength. Symptoms are exacerbated by walking, ascending stairs and descending stairs (pivoting)  Relieved by: cortisone injection.          Re: Freeman Velazquez   :   1960           Objective:     Hip Evaluation  Hip Strength:    Extension:  Left: 4+/5  Pain:Right: 4+/5    Pain:    Abduction:  " Left: 4+/5     Pain:Right: 4+/5    Pain:       Knee Evaluation:  ROM:    AROM  Extension:  Left: 3    Right:  0  Flexion: Left: 120    Right: 128  PROM  Extension: Left: 0   Right:   Flexion: Left: 125   Right:     Strength:   Extension:  Left: 5-/5   Pain:      Right: 5/5   Pain:  Flexion:  Left: 5/5   Pain:      Right: 5-/5   Pain:    Quad Set Left: Good    Pain:     Palpation:  Palpation of knee: no pain with palpation today, but patient indicates it was very painful prior to injection.    Assessment/Plan:    Patient is a 60 year old male with both sides knee complaints.    Patient has the following significant findings with corresponding treatment plan.                Diagnosis 1:  B knee pain (L>R)  Pain -  manual therapy  Decreased ROM/flexibility - manual therapy, therapeutic exercise and therapeutic activity  Decreased strength - therapeutic exercise and therapeutic activities  Decreased function - therapeutic activities    Therapy Evaluation Codes:   Cumulative Therapy Evaluation is: Low complexity.    Previous and current functional limitations:  (See Goal Flow Sheet for this information)    Short term and Long term goals: (See Goal Flow Sheet for this information)     Communication ability:  Patient appears to be able to clearly communicate and understand verbal and written communication and follow directions correctly.  Treatment Explanation - The following has been discussed with the patient:   RX ordered/plan of care  Anticipated outcomes  Possible risks and side effects  This patient would benefit from PT intervention to resume normal activities.   Rehab potential is good.    Frequency:  1 X week, once daily  Duration:  for 6 weeks  Discharge Plan:  Achieve all LTG.  Independent in home treatment program.  Reach maximal therapeutic benefit.  Re: Freeman Velazquez   :   1960    Please refer to the daily flowsheet for treatment today, total treatment time and time spent performing 1:1 timed  codes.       Thank you for your referral.    INQUIRIES  Therapist: Quinn Phillips, PT DPT  [facility]  [address]  [address]  [place] [address]  Phone: [phone]  Fax: [phone]

## 2020-08-26 NOTE — PROGRESS NOTES
"[facility] Initial Evaluation  Subjective:  The history is provided by the patient.   Therapist Generated HPI Evaluation  Problem details: MD order for range of motion, strengthening. Pre-morbid activities: very active with tennis, golf (uses cart), baseball with family, table tennis. Hasn't bowled in 2 years.     Had steroid injection on 8/17/2020 and notices a substantial change. \"Has not felt this good in years.\" Noticing more range of motion and less pain, more strength too. .         Type of problem:  Bilateral knees.    This is a chronic condition.              Associated symptoms:  Buckling/giving out, loss of motion/stiffness and loss of strength. Symptoms are exacerbated by walking, ascending stairs and descending stairs (pivoting)  Relieved by: cortisone injection.          Patient Health History  Freeman Velazquez being seen for L knee PT.     Date of Onset: 1986 - MD referral 8/17/20.   Problem occurred: Multiple knee injuries   Pain is reported as 2/10 on pain scale.    Pertinent medical history includes: osteoarthritis.   Red flags:  None as reported by patient.      Other surgery history details: 3 arthroscopies in 1989, 1990, 1991.    Current medications:  Anti-inflammatory.    Current occupation  [employer].   Primary job tasks include:  Computer work.   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                                  Objective:  System                                           Hip Evaluation    Hip Strength:      Extension:  Left: 4+/5  Pain:Right: 4+/5    Pain:    Abduction:  Left: 4+/5     Pain:Right: 4+/5    Pain:                           Knee Evaluation:  ROM:    AROM      Extension:  Left: 3    Right:  0  Flexion: Left: 120    Right: 128  PROM      Extension: Left: 0   Right:   Flexion: Left: 125   Right:       Strength:     Extension:  Left: 5-/5   Pain:      Right: 5/5   Pain:  Flexion:  Left: 5/5   Pain:      Right: 5-/5   Pain:  "   Quad Set Left: Good    Pain:         Palpation:  Palpation of knee: no pain with palpation today, but patient indicates it was very painful prior to injection.                General     ROS    Assessment/Plan:    Patient is a 60 year old male with both sides knee complaints.    Patient has the following significant findings with corresponding treatment plan.                Diagnosis 1:  B knee pain (L>R)  Pain -  manual therapy  Decreased ROM/flexibility - manual therapy, therapeutic exercise and therapeutic activity  Decreased strength - therapeutic exercise and therapeutic activities  Decreased function - therapeutic activities    Therapy Evaluation Codes:     Cumulative Therapy Evaluation is: Low complexity.    Previous and current functional limitations:  (See Goal Flow Sheet for this information)    Short term and Long term goals: (See Goal Flow Sheet for this information)     Communication ability:  Patient appears to be able to clearly communicate and understand verbal and written communication and follow directions correctly.  Treatment Explanation - The following has been discussed with the patient:   RX ordered/plan of care  Anticipated outcomes  Possible risks and side effects  This patient would benefit from PT intervention to resume normal activities.   Rehab potential is good.    Frequency:  1 X week, once daily  Duration:  for 6 weeks  Discharge Plan:  Achieve all LTG.  Independent in home treatment program.  Reach maximal therapeutic benefit.    Please refer to the daily flowsheet for treatment today, total treatment time and time spent performing 1:1 timed codes.

## 2020-09-02 ENCOUNTER — THERAPY VISIT (OUTPATIENT)
Dept: PHYSICAL THERAPY | Facility: CLINIC | Age: 60
End: 2020-09-02
Payer: COMMERCIAL

## 2020-09-02 DIAGNOSIS — M17.0 OSTEOARTHRITIS OF BOTH KNEES, UNSPECIFIED OSTEOARTHRITIS TYPE: ICD-10-CM

## 2020-09-02 PROCEDURE — 97112 NEUROMUSCULAR REEDUCATION: CPT | Mod: GP | Performed by: PHYSICAL THERAPIST

## 2020-09-02 PROCEDURE — 97110 THERAPEUTIC EXERCISES: CPT | Mod: GP | Performed by: PHYSICAL THERAPIST

## 2020-09-02 PROCEDURE — 97140 MANUAL THERAPY 1/> REGIONS: CPT | Mod: GP | Performed by: PHYSICAL THERAPIST

## 2020-09-17 ENCOUNTER — THERAPY VISIT (OUTPATIENT)
Dept: PHYSICAL THERAPY | Facility: CLINIC | Age: 60
End: 2020-09-17
Payer: COMMERCIAL

## 2020-09-17 DIAGNOSIS — M17.0 OSTEOARTHRITIS OF BOTH KNEES, UNSPECIFIED OSTEOARTHRITIS TYPE: ICD-10-CM

## 2020-09-17 PROCEDURE — 97110 THERAPEUTIC EXERCISES: CPT | Mod: GP | Performed by: PHYSICAL THERAPIST

## 2020-09-17 PROCEDURE — 97530 THERAPEUTIC ACTIVITIES: CPT | Mod: GP | Performed by: PHYSICAL THERAPIST

## 2020-10-23 ENCOUNTER — NURSE TRIAGE (OUTPATIENT)
Dept: NURSING | Facility: CLINIC | Age: 60
End: 2020-10-23

## 2020-10-23 NOTE — TELEPHONE ENCOUNTER
"Direct contact with a covid19 positive patient via work spent >2 hours with person.   Last contact was on Tuesday.     NO symptoms by patient.   Home care suggestions reviewed with patient per RN protocol.     Emma Pearson RN on 10/23/2020 at 3:15 PM    COVID 19 Nurse Triage Plan/Patient Instructions    Please be aware that novel coronavirus (COVID-19) may be circulating in the community. If you develop symptoms such as fever, cough, or SOB or if you have concerns about the presence of another infection including coronavirus (COVID-19), please contact your health care provider or visit [email or web address].     Disposition/Instructions    Additional COVID19 information to add for patients.   How can I protect others?  If you have symptoms (fever, cough, body aches or trouble breathing): Stay home and away from others (self-isolate) until:    At least 10 days have passed since your symptoms started, And     You ve had no fever--and no medicine that reduces fever--for 1 full day (24 hours), And      Your other symptoms have resolved (gotten better).     If you don t have symptoms, but a test showed that you have COVID-19 (you tested positive):    Stay home and away from others (self-isolate). Follow the tips under \"How do I self-isolate?\" below for 10 days (20 days if you have a weak immune system).    You don't need to be retested for COVID-19 before going back to school or work. As long as you're fever-free and feeling better, you can go back to school, work and other activities after waiting the 10 or 20 days.     How do I self-isolate?    Stay in your own room, even for meals. Use your own bathroom if you can.     Stay away from others in your home. No hugging, kissing or shaking hands. No visitors.    Don t go to work, school or anywhere else.     Clean  high touch  surfaces often (doorknobs, counters, handles, etc.). Use a household cleaning spray or wipes. You ll find a full list on the EPA website:  " www.epa.gov/pesticide-registration/list-n-disinfectants-use-against-sars-cov-2.    Cover your mouth and nose with a mask, tissue or washcloth to avoid spreading germs.    Wash your hands and face often. Use soap and water.    Caregivers in these groups are at risk for severe illness due to COVID-19:  o People 65 years and older  o People who live in a nursing home or long-term care facility  o People with chronic disease (lung, heart, cancer, diabetes, kidney, liver, immunologic)  o People who have a weakened immune system, including those who:  - Are in cancer treatment  - Take medicine that weakens the immune system, such as corticosteroids  - Had a bone marrow or organ transplant  - Have an immune deficiency  - Have poorly controlled HIV or AIDS  - Are obese (body mass index of 40 or higher)  - Smoke regularly    Caregivers should wear gloves while washing dishes, handling laundry and cleaning bedrooms and bathrooms.    Use caution when washing and drying laundry: Don t shake dirty laundry, and use the warmest water setting that you can.    For more tips, go to www.cdc.gov/coronavirus/2019-ncov/downloads/10Things.pdf.    How can I take care of myself?  1. Get lots of rest. Drink extra fluids (unless a doctor has told you not to).     2. Take Tylenol (acetaminophen) for fever or pain. If you have liver or kidney problems, ask your family doctor if it s okay to take Tylenol.     Adults can take either:     650 mg (two 325 mg pills) every 4 to 6 hours, or     1,000 mg (two 500 mg pills) every 8 hours as needed.     Note: Don t take more than 3,000 mg in one day.   Acetaminophen is found in many medicines (both prescribed and over-the-counter medicines). Read all labels to be sure you don t take too much.     For children, check the Tylenol bottle for the right dose. The dose is based on the child s age or weight.    3. If you have other health problems (like cancer, heart failure, an organ transplant or severe  kidney disease): Call your specialty clinic if you don t feel better in the next 2 days.    4. Know when to call 911: Emergency warning signs include:    Trouble breathing or shortness of breath    Pain or pressure in the chest that doesn t go away    Feeling confused like you haven t felt before, or not being able to wake up    Bluish-colored lips or face    What are the symptoms of COVID-19?     The most common symptoms are cough, fever and trouble breathing.     Less common symptoms include body aches, chills, diarrhea (loose, watery poops), fatigue (feeling very tired), headache, runny nose, sore throat and loss of smell.    COVID-19 can cause severe coughing (bronchitis) and lung infection (pneumonia).    How does it spread?     The virus may spread when a person coughs or sneezes into the air. The virus can travel about 6 feet this way, and it can live on surfaces.      Common  (household disinfectants) will kill the virus.    Who is at risk?  Anyone can catch COVID-19 if they re around someone who has the virus.    How can others protect themselves?     Stay away from people who have COVID-19 (or symptoms of COVID-19).    Wash hands often with soap and water. Or, use hand  with at least 60% alcohol.    Avoid touching the eyes, nose or mouth.     Wear a face mask when you go out in public, when sick or when caring for a sick person.    Where can I get more information?    [facility]: About COVID-19: [email or web address]    CDC: What to Do If You re Sick: www.cdc.gov/coronavirus/2019-ncov/about/steps-when-sick.html    CDC: Ending Home Isolation: www.cdc.gov/coronavirus/2019-ncov/hcp/disposition-in-home-patients.html     CDC: Caring for Someone: www.cdc.gov/coronavirus/2019-ncov/if-you-are-sick/care-for-someone.html     [facility]: Interim Guidance for Hospital Discharge to Home: www.health.[facility].mn.us/diseases/coronavirus/hcp/hospdischarge.pdf    [facility] [place]  trials (COVID-19 research studies): clinicalaffairs.[facility]/umn-clinical-trials     Below are the COVID-19 hotlines at the [place] Department of [facility] ([facility]). Interpreters are available.   o For health questions: Call 660-446-6217 or 1-899.929.7666 (7 a.m. to 7 p.m.)  o For questions about schools and childcare: Call 652-651-7278 or 1-537.617.9026 (7 a.m. to 7 p.m.)          Thank you for taking steps to prevent the spread of this virus.  o Limit your contact with others.  o Wear a simple mask to cover your cough.  o Wash your hands well and often.    [facility]: About COVID-19: [email or web address]    CDC: What to Do If You're Sick: www.cdc.gov/coronavirus/2019-ncov/about/steps-when-sick.html    CDC: Ending Home Isolation: www.cdc.gov/coronavirus/2019-ncov/hcp/disposition-in-home-patients.html     CDC: Caring for Someone: www.cdc.gov/coronavirus/2019-ncov/if-you-are-sick/care-for-someone.html     [facility]: Interim Guidance for Hospital Discharge to Home: www.[facility].mn./diseases/coronavirus/hcp/hospdischarge.pdf    [facility] clinical trials (COVID-19 research studies): clinicalaffairs.[facility]-clinical-trials     Below are the COVID-19 hotlines at the [place] [facility] ([facility]). Interpreters are available.   o For health questions: Call 557-240-7376 or 1-803.434.2085 (7 a.m. to 7 p.m.)  o For questions about schools and childcare: Call 934-712-7347 or 1-469.385.7084 (7 a.m. to 7 p.m.)                     COVID 19 Nurse Triage Plan/Patient Instructions    Please be aware that novel coronavirus (COVID-19) may be circulating in the community. If you develop symptoms such as fever, cough, or SOB or if you have concerns about the presence of another infection including coronavirus (COVID-19), please contact your health care provider or visit [email or web address].     Disposition/Instructions    Virtual Visit with provider recommended. Reference Visit Selection  Guide.    Thank you for taking steps to prevent the spread of this virus.  o Limit your contact with others.  o Wear a simple mask to cover your cough.  o Wash your hands well and often.    Resources    [facility]: About COVID-19: [email or web address]    CDC: What to Do If You're Sick: www.cdc.gov/coronavirus/2019-ncov/about/steps-when-sick.html    CDC: Ending Home Isolation: www.cdc.gov/coronavirus/2019-ncov/hcp/disposition-in-home-patients.html     CDC: Caring for Someone: www.cdc.gov/coronavirus/2019-ncov/if-you-are-sick/care-for-someone.html     [facility]: Interim Guidance for Hospital Discharge to Home: www.[facility].mn./diseases/coronavirus/hcp/hospdischarge.pdf    [facility] clinical trials (COVID-19 research studies): clinicalaffairs.[facility]-clinical-trials     Below are the COVID-19 hotlines at the [place] Department [facility] ([facility]). Interpreters are available.   o For health questions: Call 664-351-6106 or 1-766.492.7403 (7 a.m. to 7 p.m.)  o For questions about schools and childcare: Call 612-589-1144 or 1-975.446.8784 (7 a.m. to 7 p.m.)                     Reason for Disposition    [1] COVID-19 EXPOSURE (Close Contact) within last 14 days AND [2] needs COVID-19 lab test to return to work AND [3] NO symptoms    Additional Information    Negative: COVID-19 has been diagnosed by a healthcare provider (HCP)    Negative: COVID-19 lab test positive    Negative: [1] Symptoms of COVID-19 (e.g., cough, fever, SOB, or others) AND [2] lives in an area with community spread    Negative: [1] Symptoms of COVID-19 (e.g., cough, fever, SOB, or others) AND [2] within 14 days of EXPOSURE (close contact) with diagnosed or suspected COVID-19 patient    Negative: [1] Symptoms of COVID-19 (e.g., cough, fever, SOB, or others) AND [2] within 14 days of travel from high-risk area for COVID-19 community spread (identified by CDC)    Negative: [1] Difficulty breathing (shortness of breath)  occurs AND [2] onset > 14 days after COVID-19 EXPOSURE (Close Contact) AND [3] no community spread where patient lives    Negative: [1] Dry cough occurs AND [2] onset > 14 days after COVID-19 EXPOSURE AND [3] no community spread where patient lives    Negative: [1] Wet cough (i.e., white-yellow, yellow, green, or samuel colored sputum) AND [2] onset > 14 days after COVID-19 EXPOSURE AND [3] no community spread where patient lives    Negative: [1] Common cold symptoms AND [2] onset > 14 days after COVID-19 EXPOSURE AND [3] no community spread where patient lives    Protocols used: CORONAVIRUS (COVID-19) EXPOSURE-A- 8.4.20

## 2020-11-29 ENCOUNTER — HEALTH MAINTENANCE LETTER (OUTPATIENT)
Age: 60
End: 2020-11-29

## 2021-01-17 NOTE — PROGRESS NOTES
[facility] - PRIMARY CARE SKIN    CC : skin cancer screening (full-body)  SUBJECTIVE:                                                    Freeman Velazquez is a 60 year old male who presents to clinic today for a full-body skin exam because of his family history of melanoma.    Issue one : noticed scaly spots on the right temple, and left scalp.  Personal history of skin cancer : NO.  Family history of skin cancer : YES - father  of metastatic malignant melanoma and melanoma in paternal uncle. No skin cancer in mother's side    Sun Exposure History  Previous history of significant sun exposure: YES  Sunscreen Use : YES, frequency : he reports increasingly frequent use as he ages.  Wide-brimmed hats : YES.  Avoids mid-day sun : YES.    Occupation :  (indoor).    Refer to electronic medical record (EMR) for past medical history and medications.    INTEGUMENTARY/SKIN: POSITIVE for changing lesions  ROS : 14 point review of systems was negative except the symptoms listed above in the HPI.    OBJECTIVE:                                                    GENERAL: healthy, alert and no distress  SKIN: Lopez Skin Type - II.  This patient was examined from the top of the head to the bottom of the feet  including scalp, face, neck, back, chest, breasts, buttocks, both arms, both legs, both hands, both feet, all 10 fingers and all 10 toes. The dermatoscope was used to help evaluate pigmented lesions.  Skin Pertinent Findings:  Left lateral forehead- brown coarse textured lesion  Left mid cheek, left temple         Erythematous, scaly, non-indurated lesion(s) most consistent with actinic keratosis   Name: Liquid nitrogen [facility]-Ac cryotherapy  Indication: Pre-malignant actinic keratosis  Completed by: Nita Bell MD.  Note : Discussed natural history of lesion and treatment options. Prior to treatment, we discussed inflammation, tenderness post-procedure, the healing process, and the risks of  pain, infection, scarring, blistering, and hypo-/hyperpigmentation after healing. Explained that these lesions may grow back and may need additional treatment or re-evaluation. The patient understood and verbally agreed to proceed with cryotherapy.    Each actinic keratosis was treated using liquid nitrogen Cry-Ac with a two five second bursts with a pause to allow for the area to thaw.    The patient tolerated the procedure well and left in good condition. If this lesion should persist or recur, then it needs to be re-evaluated.  Total number of lesions treated: 5    Trunk, arms, legs,           Brown, macule(s) most consistent with benign solar lentigo          Raised, coarse textured, stuck appearing lesion consistent with seborrheic keratosis .          Slightly raised, red lesion(s) consistent with capillary hemangioma          Brown macules of various sizes and shapes most consistent with (benign) melanocytic nevi                  Diagnostic Test Results:  none       ASSESSMENT:                                                      Encounter Diagnoses   Name Primary?     Skin exam for malignant neoplasm Yes     Seborrheic keratosis      Actinic keratosis      Solar lentiginosis      Melanocytic nevi of trunk          PLAN:                                                    Patient Instructions   Skin exam in one year    ACTINIC KERATOSES POST-TREATMENT CARE INSTRUCTIONS  Actinic keratoses are benign, scaly or gritty lesions that appear in sun-exposed areas and may progress to skin cancers. For this reason, it is important to treat them before they become cancerous. Liquid nitrogen is the most commonly used and most effective treatment for actinic keratoses; it is mildly uncomfortable when applied to the skin, but the discomfort rapidly subsides.    Post-Treatment:  You may experience burning and/or stinging immediately following the procedure. The discomfort from the procedure may persist over the next 12-24  hours. The area treated will look pinker and slightly swollen before the healing process begins. You may also notice redness, swelling, tenderness, weeping and crusts or scabs. Healing time is approximately 10-14 days.    Blister - You may or may notice blistering from the freezing. If you develop an uncomfortable blister from today's treatment, you may gently puncture this with a needle that has been cleaned with alcohol. However, do not remove the protective skin layer of the blister.    Scab - After a few days, you may notice scaliness or scab formation. Do not pick at the scabs because this may cause slower healing and a permanent scar.    The skin may appear temporarily darker at the treatment site, but this usually fades over a period of months, provided that the area is protected from the sun.    Care of the areas treated:    Wash the area with a mild cleanser.    Gently pat dry.    Do not rub.     Keep protected from the sun during the healing process and for a full year following treatment as the skin continues to remodel during this time.    Apply Vaseline or Aquaphor ointment sparingly to the site for the first 7 days after treatment.    Do not use Neosporin, as many people eventually develop a medication allergy, that can easily be confused with an infection, to Neomycin.    Return if:  There should not be any residual scaling. If there is any concern that the lesion has persisted after 4-6 weeks, make an appointment for a re-check. Healing time does vary depending on your individual healing process and the area of the body treated. Most patients will be healed in one month.    Signs of Infection:  Thankfully this is rare. However if you notice persistent colored drainage, increasing pain, fever or other signs of infection, please call us at: [phone]      The patient was counseled about sunscreens and sun avoidance. The patient was counseled to check the skin regularly and report any lesion that  is new, changing, itching, scabbing, bleeding or otherwise bothersome. The patient was discharged ambulatory and in stable condition.    Educational Brochures given to patient : skin cancer.       PROCEDURES:                                                    None.    TT : 20 minutes.

## 2021-01-19 ENCOUNTER — OFFICE VISIT (OUTPATIENT)
Dept: FAMILY MEDICINE | Facility: CLINIC | Age: 61
End: 2021-01-19
Payer: COMMERCIAL

## 2021-01-19 VITALS — DIASTOLIC BLOOD PRESSURE: 68 MMHG | SYSTOLIC BLOOD PRESSURE: 122 MMHG

## 2021-01-19 DIAGNOSIS — L82.1 SEBORRHEIC KERATOSIS: ICD-10-CM

## 2021-01-19 DIAGNOSIS — D22.5 MELANOCYTIC NEVI OF TRUNK: ICD-10-CM

## 2021-01-19 DIAGNOSIS — Z12.83 SKIN EXAM FOR MALIGNANT NEOPLASM: Primary | ICD-10-CM

## 2021-01-19 DIAGNOSIS — L57.0 ACTINIC KERATOSIS: ICD-10-CM

## 2021-01-19 DIAGNOSIS — L81.4 SOLAR LENTIGINOSIS: ICD-10-CM

## 2021-01-19 PROCEDURE — 17003 DESTRUCT PREMALG LES 2-14: CPT | Performed by: FAMILY MEDICINE

## 2021-01-19 PROCEDURE — 99213 OFFICE O/P EST LOW 20 MIN: CPT | Mod: 25 | Performed by: FAMILY MEDICINE

## 2021-01-19 PROCEDURE — 17000 DESTRUCT PREMALG LESION: CPT | Performed by: FAMILY MEDICINE

## 2021-01-19 NOTE — LETTER
2021         RE: Freeman Velazquez  [address]  [facility] [id number]        Dear Colleague,    Thank you for referring your patient, Freeman Velazquez, to the [facility]. Please see a copy of my visit note below.    [facility] - PRIMARY CARE SKIN    CC : skin cancer screening (full-body)  SUBJECTIVE:                                                    Freeman Velazquez is a 60 year old male who presents to clinic today for a full-body skin exam because of his family history of melanoma.    Issue one : noticed scaly spots on the right temple, and left scalp.  Personal history of skin cancer : NO.  Family history of skin cancer : YES - father  of metastatic malignant melanoma and melanoma in paternal uncle. No skin cancer in mother's side    Sun Exposure History  Previous history of significant sun exposure: YES  Sunscreen Use : YES, frequency : he reports increasingly frequent use as he ages.  Wide-brimmed hats : YES.  Avoids mid-day sun : YES.    Occupation :  (indoor).    Refer to electronic medical record (EMR) for past medical history and medications.    INTEGUMENTARY/SKIN: POSITIVE for changing lesions  ROS : 14 point review of systems was negative except the symptoms listed above in the HPI.    OBJECTIVE:                                                    GENERAL: healthy, alert and no distress  SKIN: Lopez Skin Type - II.  This patient was examined from the top of the head to the bottom of the feet  including scalp, face, neck, back, chest, breasts, buttocks, both arms, both legs, both hands, both feet, all 10 fingers and all 10 toes. The dermatoscope was used to help evaluate pigmented lesions.  Skin Pertinent Findings:  Left lateral forehead- brown coarse textured lesion  Left mid cheek, left temple         Erythematous, scaly, non-indurated lesion(s) most consistent with actinic keratosis   Name: Liquid nitrogen  Cry-Ac cryotherapy  Indication: Pre-malignant actinic keratosis  Completed by: Nita Bell MD.  Note : Discussed natural history of lesion and treatment options. Prior to treatment, we discussed inflammation, tenderness post-procedure, the healing process, and the risks of pain, infection, scarring, blistering, and hypo-/hyperpigmentation after healing. Explained that these lesions may grow back and may need additional treatment or re-evaluation. The patient understood and verbally agreed to proceed with cryotherapy.    Each actinic keratosis was treated using liquid nitrogen Cry-Ac with a two five second bursts with a pause to allow for the area to thaw.    The patient tolerated the procedure well and left in good condition. If this lesion should persist or recur, then it needs to be re-evaluated.  Total number of lesions treated: 5    Trunk, arms, legs,           Brown, macule(s) most consistent with benign solar lentigo          Raised, coarse textured, stuck appearing lesion consistent with seborrheic keratosis .          Slightly raised, red lesion(s) consistent with capillary hemangioma          Brown macules of various sizes and shapes most consistent with (benign) melanocytic nevi                  Diagnostic Test Results:  none       ASSESSMENT:                                                      Encounter Diagnoses   Name Primary?     Skin exam for malignant neoplasm Yes     Seborrheic keratosis      Actinic keratosis      Solar lentiginosis      Melanocytic nevi of trunk          PLAN:                                                    Patient Instructions   Skin exam in one year    ACTINIC KERATOSES POST-TREATMENT CARE INSTRUCTIONS  Actinic keratoses are benign, scaly or gritty lesions that appear in sun-exposed areas and may progress to skin cancers. For this reason, it is important to treat them before they become cancerous. Liquid nitrogen is the most commonly used and most effective treatment for  actinic keratoses; it is mildly uncomfortable when applied to the skin, but the discomfort rapidly subsides.    Post-Treatment:  You may experience burning and/or stinging immediately following the procedure. The discomfort from the procedure may persist over the next 12-24 hours. The area treated will look pinker and slightly swollen before the healing process begins. You may also notice redness, swelling, tenderness, weeping and crusts or scabs. Healing time is approximately 10-14 days.    Blister - You may or may notice blistering from the freezing. If you develop an uncomfortable blister from today's treatment, you may gently puncture this with a needle that has been cleaned with alcohol. However, do not remove the protective skin layer of the blister.    Scab - After a few days, you may notice scaliness or scab formation. Do not pick at the scabs because this may cause slower healing and a permanent scar.    The skin may appear temporarily darker at the treatment site, but this usually fades over a period of months, provided that the area is protected from the sun.    Care of the areas treated:    Wash the area with a mild cleanser.    Gently pat dry.    Do not rub.     Keep protected from the sun during the healing process and for a full year following treatment as the skin continues to remodel during this time.    Apply Vaseline or Aquaphor ointment sparingly to the site for the first 7 days after treatment.    Do not use Neosporin, as many people eventually develop a medication allergy, that can easily be confused with an infection, to Neomycin.    Return if:  There should not be any residual scaling. If there is any concern that the lesion has persisted after 4-6 weeks, make an appointment for a re-check. Healing time does vary depending on your individual healing process and the area of the body treated. Most patients will be healed in one month.    Signs of Infection:  Thankfully this is rare. However if  you notice persistent colored drainage, increasing pain, fever or other signs of infection, please call us at: [phone]      The patient was counseled about sunscreens and sun avoidance. The patient was counseled to check the skin regularly and report any lesion that is new, changing, itching, scabbing, bleeding or otherwise bothersome. The patient was discharged ambulatory and in stable condition.    Educational Brochures given to patient : skin cancer.       PROCEDURES:                                                    None.    TT : 20 minutes.              Again, thank you for allowing me to participate in the care of your patient.        Sincerely,        Kimmy Bell MD

## 2021-01-19 NOTE — PATIENT INSTRUCTIONS
Skin exam in one year    ACTINIC KERATOSES POST-TREATMENT CARE INSTRUCTIONS  Actinic keratoses are benign, scaly or gritty lesions that appear in sun-exposed areas and may progress to skin cancers. For this reason, it is important to treat them before they become cancerous. Liquid nitrogen is the most commonly used and most effective treatment for actinic keratoses; it is mildly uncomfortable when applied to the skin, but the discomfort rapidly subsides.    Post-Treatment:  You may experience burning and/or stinging immediately following the procedure. The discomfort from the procedure may persist over the next 12-24 hours. The area treated will look pinker and slightly swollen before the healing process begins. You may also notice redness, swelling, tenderness, weeping and crusts or scabs. Healing time is approximately 10-14 days.    Blister - You may or may notice blistering from the freezing. If you develop an uncomfortable blister from today's treatment, you may gently puncture this with a needle that has been cleaned with alcohol. However, do not remove the protective skin layer of the blister.    Scab - After a few days, you may notice scaliness or scab formation. Do not pick at the scabs because this may cause slower healing and a permanent scar.    The skin may appear temporarily darker at the treatment site, but this usually fades over a period of months, provided that the area is protected from the sun.    Care of the areas treated:    Wash the area with a mild cleanser.    Gently pat dry.    Do not rub.     Keep protected from the sun during the healing process and for a full year following treatment as the skin continues to remodel during this time.    Apply Vaseline or Aquaphor ointment sparingly to the site for the first 7 days after treatment.    Do not use Neosporin, as many people eventually develop a medication allergy, that can easily be confused with an infection, to Neomycin.    Return  if:  There should not be any residual scaling. If there is any concern that the lesion has persisted after 4-6 weeks, make an appointment for a re-check. Healing time does vary depending on your individual healing process and the area of the body treated. Most patients will be healed in one month.    Signs of Infection:  Thankfully this is rare. However if you notice persistent colored drainage, increasing pain, fever or other signs of infection, please call us at: [phone]

## 2021-03-08 ENCOUNTER — ANCILLARY PROCEDURE (OUTPATIENT)
Dept: GENERAL RADIOLOGY | Facility: CLINIC | Age: 61
End: 2021-03-08
Attending: FAMILY MEDICINE
Payer: COMMERCIAL

## 2021-03-08 ENCOUNTER — OFFICE VISIT (OUTPATIENT)
Dept: FAMILY MEDICINE | Facility: CLINIC | Age: 61
End: 2021-03-08
Payer: COMMERCIAL

## 2021-03-08 VITALS
HEART RATE: 75 BPM | HEIGHT: 75 IN | TEMPERATURE: 97.8 F | SYSTOLIC BLOOD PRESSURE: 117 MMHG | DIASTOLIC BLOOD PRESSURE: 73 MMHG | WEIGHT: 190 LBS | RESPIRATION RATE: 18 BRPM | BODY MASS INDEX: 23.62 KG/M2 | OXYGEN SATURATION: 98 %

## 2021-03-08 DIAGNOSIS — R19.8 ALTERED BOWEL FUNCTION: ICD-10-CM

## 2021-03-08 DIAGNOSIS — K59.00 CONSTIPATION, UNSPECIFIED CONSTIPATION TYPE: ICD-10-CM

## 2021-03-08 DIAGNOSIS — R19.8 ALTERED BOWEL FUNCTION: Primary | ICD-10-CM

## 2021-03-08 LAB
ALBUMIN SERPL-MCNC: 4.1 G/DL (ref 3.4–5)
ALBUMIN UR-MCNC: NEGATIVE MG/DL
ALP SERPL-CCNC: 103 U/L (ref 40–150)
ALT SERPL W P-5'-P-CCNC: 32 U/L (ref 0–70)
APPEARANCE UR: CLEAR
AST SERPL W P-5'-P-CCNC: 23 U/L (ref 0–45)
BASOPHILS # BLD AUTO: 0 10E9/L (ref 0–0.2)
BASOPHILS NFR BLD AUTO: 0.2 %
BILIRUB DIRECT SERPL-MCNC: 0.2 MG/DL (ref 0–0.2)
BILIRUB SERPL-MCNC: 0.9 MG/DL (ref 0.2–1.3)
BILIRUB UR QL STRIP: NEGATIVE
COLOR UR AUTO: YELLOW
DIFFERENTIAL METHOD BLD: NORMAL
EOSINOPHIL # BLD AUTO: 0.1 10E9/L (ref 0–0.7)
EOSINOPHIL NFR BLD AUTO: 1 %
ERYTHROCYTE [DISTWIDTH] IN BLOOD BY AUTOMATED COUNT: 14.1 % (ref 10–15)
GLUCOSE UR STRIP-MCNC: NEGATIVE MG/DL
HCT VFR BLD AUTO: 46.5 % (ref 40–53)
HGB BLD-MCNC: 15.3 G/DL (ref 13.3–17.7)
HGB UR QL STRIP: NEGATIVE
KETONES UR STRIP-MCNC: NEGATIVE MG/DL
LEUKOCYTE ESTERASE UR QL STRIP: NEGATIVE
LIPASE SERPL-CCNC: 84 U/L (ref 73–393)
LYMPHOCYTES # BLD AUTO: 1.4 10E9/L (ref 0.8–5.3)
LYMPHOCYTES NFR BLD AUTO: 26.2 %
MCH RBC QN AUTO: 29 PG (ref 26.5–33)
MCHC RBC AUTO-ENTMCNC: 32.9 G/DL (ref 31.5–36.5)
MCV RBC AUTO: 88 FL (ref 78–100)
MONOCYTES # BLD AUTO: 0.5 10E9/L (ref 0–1.3)
MONOCYTES NFR BLD AUTO: 9.3 %
NEUTROPHILS # BLD AUTO: 3.3 10E9/L (ref 1.6–8.3)
NEUTROPHILS NFR BLD AUTO: 63.3 %
NITRATE UR QL: NEGATIVE
PH UR STRIP: 5.5 PH (ref 5–7)
PLATELET # BLD AUTO: 310 10E9/L (ref 150–450)
PROT SERPL-MCNC: 7.7 G/DL (ref 6.8–8.8)
RBC # BLD AUTO: 5.27 10E12/L (ref 4.4–5.9)
SOURCE: NORMAL
SP GR UR STRIP: >1.03 (ref 1–1.03)
TSH SERPL DL<=0.005 MIU/L-ACNC: 0.66 MU/L (ref 0.4–4)
UROBILINOGEN UR STRIP-ACNC: 0.2 EU/DL (ref 0.2–1)
WBC # BLD AUTO: 5.2 10E9/L (ref 4–11)

## 2021-03-08 PROCEDURE — 99214 OFFICE O/P EST MOD 30 MIN: CPT | Performed by: FAMILY MEDICINE

## 2021-03-08 PROCEDURE — 74019 RADEX ABDOMEN 2 VIEWS: CPT | Performed by: RADIOLOGY

## 2021-03-08 PROCEDURE — 80076 HEPATIC FUNCTION PANEL: CPT | Performed by: FAMILY MEDICINE

## 2021-03-08 PROCEDURE — 84443 ASSAY THYROID STIM HORMONE: CPT | Performed by: FAMILY MEDICINE

## 2021-03-08 PROCEDURE — 85025 COMPLETE CBC W/AUTO DIFF WBC: CPT | Performed by: FAMILY MEDICINE

## 2021-03-08 PROCEDURE — 36415 COLL VENOUS BLD VENIPUNCTURE: CPT | Performed by: FAMILY MEDICINE

## 2021-03-08 PROCEDURE — 83690 ASSAY OF LIPASE: CPT | Performed by: FAMILY MEDICINE

## 2021-03-08 PROCEDURE — 81003 URINALYSIS AUTO W/O SCOPE: CPT | Performed by: FAMILY MEDICINE

## 2021-03-08 RX ORDER — POLYETHYLENE GLYCOL 3350 17 G/17G
1 POWDER, FOR SOLUTION ORAL DAILY
Qty: 225 G | Refills: 0 | Status: SHIPPED | OUTPATIENT
Start: 2021-03-08 | End: 2023-12-08

## 2021-03-08 ASSESSMENT — PAIN SCALES - GENERAL: PAINLEVEL: NO PAIN (0)

## 2021-03-08 ASSESSMENT — MIFFLIN-ST. JEOR: SCORE: 1742.46

## 2021-03-08 NOTE — PATIENT INSTRUCTIONS
If any nausea, vomiting or abdominal pain -Go to Emergency room  Sofya Javed MD      Patient Education     Constipation (Adult)  Constipation means that you have bowel movements that are less frequent than usual. Stools often become very hard and difficult to pass.  Constipation is very common. At some point in life, it affects almost everyone. Since everyone's bowel habits are different, what is constipation to one person may not be to another. Your healthcare provider may do tests to diagnose constipation. It depends on what he or she finds when evaluating you.    Symptoms of constipation include:    Abdominal pain    Bloating    Vomiting    Painful bowel movements    Itching, swelling, bleeding, or pain around the anus  Causes  Constipation can have many causes. These include:    Diet low in fiber    Too much dairy    Not drinking enough liquids    Lack of exercise or physical activity (especially true for older adults)    Changes in lifestyle or daily routine, including pregnancy, aging, work, and travel    Frequent use or misuse of laxatives    Ignoring the urge to have a bowel movement or delaying it until later    Medicines, such as certain prescription pain medicines, iron supplements, antacids, certain antidepressants, and calcium supplements    Diseases like irritable bowel syndrome, bowel obstructions, stroke, diabetes, thyroid disease, Parkinson disease, hemorrhoids, and colon cancer  Complications  Potential complications of constipation can include:    Hemorrhoids    Rectal bleeding from hemorrhoids or anal fissures (skin tears)    Hernias    Dependency on laxatives    Chronic constipation    Fecal impaction, a severe form of constipation in which a large amount of hard stool is in your rectum that you can't pass    Bowel obstruction or perforation  Home care  All treatment should be done after talking with your healthcare provider. This is especially true if you have another medical problems, are  taking prescription medicines, or are an older adult. Treatment most often involves lifestyle changes. You may also need medicines. Your healthcare provider will tell you which will work best for you. Follow the advice below to help avoid this problem in the future.  Lifestyle changes  These lifestyle changes can help prevent constipation:    Diet. Eat a high-fiber diet, with fresh fruit and vegetables, and reduce dairy intake, meats, and processed foods    Fluids. It's important to get enough fluids each day. Drink plenty of water when you eat more fiber. If you are on diet that limits the amount of fluid you can have, talk about this with your healthcare provider.    Regular exercise. Check with your healthcare provider first.  Medicines  Take any medicines as directed. Some laxatives are safe to use only every now and then. Others can be taken on a regular basis. While laxatives don't cause bowel dependence, they are treating the symptoms. So your constipation may return if you don't make other changes. Talk with your healthcare provider or pharmacist if you have questions.  Prescription pain medicines can cause constipation. If you are taking this kind of medicine, ask your healthcare provider if you should also take a stool softener.  Medicines you may take to treat constipation include:    Fiber supplements    Stool softeners    Laxatives    Enemas    Rectal suppositories  Follow-up care  Follow up with your healthcare provider if symptoms don't get better in the next few days. You may need to have more tests or see a specialist.  Call 911  Call 911 if any of these occur:    Trouble breathing    Stiff, rigid abdomen that is severely painful to touch    Confusion    Fainting or loss of consciousness    Rapid heart rate    Chest pain  When to seek medical advice  Call your healthcare provider right away if any of these occur:    Fever of 100.4 F (38 C) or higher, or as directed by your healthcare  provider    Failure to resume normal bowel movements    Pain in your abdomen or back gets worse    Nausea or vomiting    Swelling in your abdomen    Blood in the stool    Black, tarry stool    Involuntary weight loss    Weakness  Brando last reviewed this educational content on 6/1/2018 2000-2020 [employer]. All rights reserved. This information is not intended as a substitute for professional medical care. Always follow your healthcare professional's instructions.

## 2021-03-08 NOTE — PROGRESS NOTES
Assessment & Plan     ICD-10-CM    1. Altered bowel function  R19.8 XR Abdomen 2 Views     Lipase     Hepatic panel     CBC with platelets differential     *UA reflex to Microscopic     TSH with free T4 reflex     GASTROENTEROLOGY ADULT REF PROCEDURE ONLY     polyethylene glycol (MIRALAX) 17 GM/Dose powder   2. Constipation, unspecified constipation type  K59.00 GASTROENTEROLOGY ADULT REF PROCEDURE ONLY     polyethylene glycol (MIRALAX) 17 GM/Dose powder     Advised increase Fiber in diet  Miralax daily  If any abdominal pain, nausea or vomiting or worse -Go to ER    Advised colonoscopy to R/O any obstruction, mass etc    Return in about 1 month (around 4/8/2021) for recheck/Please schedule appointment, Physical Exam.    Sofya Javed MD  [facility] SVEN Millard is a 61 year old who presents for the following health issues     HPI     Pt usually has Bowel movement daily-more toward the Diarrhea side but now has constipation   Constipation  Onset/Duration: 2-3 weeks ago  Description:  Frequency of bowel movements:  Only 2 BM in the last 2 weeks-hard  Has had pain LLQ  mild  No vomiting  Consistency of stool: hard   Progression of Symptoms: worsening  Accompanying signs and symptoms:    Abdominal pain: YES- left  LQ   Rectal pain: no   Blood in stool: no   Nausea/Vomiting: YES nausea which is better   Weight loss or gain: no  History:   Similar problems in past: 40 years ago  History of abdominal surgery: no  Chronic laxative use: no  New medications: no  Precipitating or alleviating factors: none  Therapies tried and outcome: None  No change in Diet  No new meds  No  otc Herbal Products  Review of Systems   CONSTITUTIONAL: NEGATIVE for fever, chills, change in weight  RESP: NEGATIVE for significant cough or SOB  CV: NEGATIVE for chest pain, palpitations or peripheral edema  GI: as above  : none  ROS otherwise negative      Objective    /73   Pulse 75   Temp 97.8  F (36.6  " C) (Oral)   Resp 18   Ht 1.905 m (6' 3\")   Wt 86.2 kg (190 lb)   SpO2 98%   BMI 23.75 kg/m    Body mass index is 23.75 kg/m .  Physical Exam   GENERAL: healthy, alert and no distress  NECK: no adenopathy, no asymmetry, masses, or scars and thyroid normal to palpation  RESP: lungs clear to auscultation - no rales, rhonchi or wheezes  CV: regular rate and rhythm, normal S1 S2, no S3 or S4, no murmur, click or rub, no peripheral edema and peripheral pulses strong  ABDOMEN: soft, nontender, no hepatosplenomegaly, no masses and bowel sounds normal  MS: no gross musculoskeletal defects noted, no edema    Results for orders placed or performed in visit on 03/08/21   CBC with platelets differential     Status: None   Result Value Ref Range    WBC 5.2 4.0 - 11.0 10e9/L    RBC Count 5.27 4.4 - 5.9 10e12/L    Hemoglobin 15.3 13.3 - 17.7 g/dL    Hematocrit 46.5 40.0 - 53.0 %    MCV 88 78 - 100 fl    MCH 29.0 26.5 - 33.0 pg    MCHC 32.9 31.5 - 36.5 g/dL    RDW 14.1 10.0 - 15.0 %    Platelet Count 310 150 - 450 10e9/L    % Neutrophils 63.3 %    % Lymphocytes 26.2 %    % Monocytes 9.3 %    % Eosinophils 1.0 %    % Basophils 0.2 %    Absolute Neutrophil 3.3 1.6 - 8.3 10e9/L    Absolute Lymphocytes 1.4 0.8 - 5.3 10e9/L    Absolute Monocytes 0.5 0.0 - 1.3 10e9/L    Absolute Eosinophils 0.1 0.0 - 0.7 10e9/L    Absolute Basophils 0.0 0.0 - 0.2 10e9/L    Diff Method Automated Method    *UA reflex to Microscopic     Status: None   Result Value Ref Range    Color Urine Yellow     Appearance Urine Clear     Glucose Urine Negative NEG^Negative mg/dL    Bilirubin Urine Negative NEG^Negative    Ketones Urine Negative NEG^Negative mg/dL    Specific Gravity Urine >1.030 1.003 - 1.035    Blood Urine Negative NEG^Negative    pH Urine 5.5 5.0 - 7.0 pH    Protein Albumin Urine Negative NEG^Negative mg/dL    Urobilinogen Urine 0.2 0.2 - 1.0 EU/dL    Nitrite Urine Negative NEG^Negative    Leukocyte Esterase Urine Negative NEG^Negative    Source " Midstream Urine

## 2021-03-10 ENCOUNTER — NURSE TRIAGE (OUTPATIENT)
Dept: NURSING | Facility: CLINIC | Age: 61
End: 2021-03-10

## 2021-03-11 NOTE — TELEPHONE ENCOUNTER
Left message on voicemail for patient to return call to the clinic at [phone]    Benjamin Page RN  [facility]

## 2021-03-11 NOTE — TELEPHONE ENCOUNTER
He can Try Milk of mag  /senna  Keep on with Miralax    If nausea and abdominal pain not better _please make a appointment tomoorrow to make sure he does not have Bowel obstruction

## 2021-03-11 NOTE — TELEPHONE ENCOUNTER
"Freeman saw Dr. Javed yesterday regarding new onset constipation, nausea, abdominal swelling and weight loss.    In the past 3 weeks he has had 3 bowel movements; 2 that \"looked like rocks\" and the last, ~3 days ago, that was ~6\" long and narrow.     He has also been experiencing some nausea and abdominal pain that comes and goes - sometimes on the left, sometimes on the right.    He has taken 2 doses of MiraLax since seeing Dr. Javed and will take 3rd dose tonight    He wants to know if there's anything else he could try to help with his constipation.    Since he has been seen by his PCP 2 days ago for this problem, Home Care advised  Care Advice reviewed.    COVID 19 Nurse Triage Plan/Patient Instructions    Please be aware that novel coronavirus (COVID-19) may be circulating in the community. If you develop symptoms such as fever, cough, or SOB or if you have concerns about the presence of another infection including coronavirus (COVID-19), please contact your health care provider or visit [email or web address].     Disposition/Instructions    Home care recommended. Follow home care protocol based instructions.    Thank you for taking steps to prevent the spread of this virus.  o Limit your contact with others.  o Wear a simple mask to cover your cough.  o Wash your hands well and often.    Resources    [facility]: About COVID-19: [email or web address]    CDC: What to Do If You're Sick: www.cdc.gov/coronavirus/2019-ncov/about/steps-when-sick.html    CDC: Ending Home Isolation: www.cdc.gov/coronavirus/2019-ncov/hcp/disposition-in-home-patients.html     CDC: Caring for Someone: www.cdc.gov/coronavirus/2019-ncov/if-you-are-sick/care-for-someone.html     [facility]: Interim Guidance for Hospital Discharge to Home: www.health.[facility].[place]/diseases/coronavirus/hcp/hospdischarge.pdf    [facility] clinical trials (COVID-19 research studies): [email or web address]/umn-[email or web address]-trials "     Below are the COVID-19 hotlines at the [place] Department of Health ([facility]). Interpreters are available.   o For health questions: Call 527-835-5536 or 1-482.821.3081 (7 a.m. to 7 p.m.)  o For questions about schools and childcare: Call 618-208-6031 or 1-244.560.1694 (7 a.m. to 7 p.m.)     Marianela Mclaughlin RN  [facility]      Additional Information    Negative: Patient sounds very sick or weak to the triager    Negative: [1] Vomiting AND [2] abdomen looks much more swollen than usual    Negative: [1] Vomiting AND [2] contains bile (green color)    Negative: [1] Constant abdominal pain AND [2] present > 2 hours    Negative: [1] Rectal pain or fullness from fecal impaction (rectum full of stool) AND [2] NOT better after SITZ bath, suppository or enema    Negative: [1] Intermittent mild abdominal pain AND [2] fever    Negative: Abdomen is more swollen than usual    Negative: Last bowel movement (BM) > 4 days ago    Negative: Leaking stool    Negative: Unable to have a bowel movement (BM) without manually removing stool (using finger to pull out stool or perform disimpaction)    Negative: Unable to have a bowel movement (BM) without laxative or enema    [1] Rectal pain or fullness from fecal impaction (rectum full of stool) AND [2] has not tried SITZ bath, suppository or enema    Negative: [1] Minor bleeding from rectum (e.g., blood just on toilet paper, few drops, streaks on surface of normal formed BM) AND [2] 3 or more times    Negative: [1] Uses laxative (e.g., PEG / Miralax. Milk of magnesia) or enema AND [2] > once a month    Negative: Constipation is a chronic symptom (recurrent or ongoing AND present > 4 weeks)    Negative: Taking new prescription medication    Protocols used: CONSTIPATION-A-AH

## 2021-03-15 NOTE — TELEPHONE ENCOUNTER
Called patient. He states that he is feeling better today. He does not have any nausea and feels less bloated. He did have a really tight abdomen before and no longer has that. No vomiting or severe pain. He had been taking the polyethylene glycol (Miralax) for 6 days, then had some small liquid bms. Last night he ended up giving himself an enema which cleared out quite a bit. He still feels a little bit uncomfortable, but is quite a bit better today. He weighs about 10 lbs less. He will monitor and see how he does over the next 24 hours. Notified him of Tello's message and advised that he could try adding in the milk of mag/senna in addition to miralax if no BM again over the next 24-48 hours, and call us back if he is not able to have a BM again or symptoms start to worsen again so that we can schedule an appointment. Pt verbalized understanding and states that he will also be calling to schedule his colonoscopy appt.

## 2021-03-17 DIAGNOSIS — Z11.59 ENCOUNTER FOR SCREENING FOR OTHER VIRAL DISEASES: ICD-10-CM

## 2021-03-26 RX ORDER — SODIUM, POTASSIUM,MAG SULFATES 17.5-3.13G
2 SOLUTION, RECONSTITUTED, ORAL ORAL SEE ADMIN INSTRUCTIONS
Qty: 354 ML | Refills: 0 | Status: SHIPPED | OUTPATIENT
Start: 2021-03-26 | End: 2023-12-01

## 2021-03-26 RX ORDER — BISACODYL 5 MG/1
5 TABLET, DELAYED RELEASE ORAL SEE ADMIN INSTRUCTIONS
Qty: 1 TABLET | Refills: 0 | Status: SHIPPED | OUTPATIENT
Start: 2021-03-26 | End: 2023-12-08

## 2021-03-30 DIAGNOSIS — R11.2 NAUSEA AND VOMITING, INTRACTABILITY OF VOMITING NOT SPECIFIED, UNSPECIFIED VOMITING TYPE: Primary | ICD-10-CM

## 2021-03-30 RX ORDER — ONDANSETRON 4 MG/1
4 TABLET, FILM COATED ORAL EVERY 6 HOURS PRN
Qty: 10 TABLET | Refills: 0 | Status: SHIPPED | OUTPATIENT
Start: 2021-03-30 | End: 2023-12-08

## 2021-04-02 DIAGNOSIS — Z11.59 ENCOUNTER FOR SCREENING FOR OTHER VIRAL DISEASES: ICD-10-CM

## 2021-04-02 LAB
LABORATORY COMMENT REPORT: NORMAL
SARS-COV-2 RNA RESP QL NAA+PROBE: NEGATIVE
SARS-COV-2 RNA RESP QL NAA+PROBE: NORMAL
SPECIMEN SOURCE: NORMAL
SPECIMEN SOURCE: NORMAL

## 2021-04-02 PROCEDURE — U0003 INFECTIOUS AGENT DETECTION BY NUCLEIC ACID (DNA OR RNA); SEVERE ACUTE RESPIRATORY SYNDROME CORONAVIRUS 2 (SARS-COV-2) (CORONAVIRUS DISEASE [COVID-19]), AMPLIFIED PROBE TECHNIQUE, MAKING USE OF HIGH THROUGHPUT TECHNOLOGIES AS DESCRIBED BY CMS-2020-01-R: HCPCS | Performed by: SURGERY

## 2021-04-02 PROCEDURE — U0005 INFEC AGEN DETEC AMPLI PROBE: HCPCS | Performed by: SURGERY

## 2021-04-05 ENCOUNTER — HOSPITAL ENCOUNTER (OUTPATIENT)
Facility: AMBULATORY SURGERY CENTER | Age: 61
Discharge: HOME | End: 2021-04-05
Attending: SURGERY | Admitting: SURGERY
Payer: COMMERCIAL

## 2021-04-05 VITALS
HEART RATE: 50 BPM | DIASTOLIC BLOOD PRESSURE: 93 MMHG | TEMPERATURE: 97.5 F | OXYGEN SATURATION: 96 % | RESPIRATION RATE: 16 BRPM | SYSTOLIC BLOOD PRESSURE: 106 MMHG

## 2021-04-05 DIAGNOSIS — Z12.11 COLON CANCER SCREENING: Primary | ICD-10-CM

## 2021-04-05 LAB — COLONOSCOPY: NORMAL

## 2021-04-05 PROCEDURE — 45330 DIAGNOSTIC SIGMOIDOSCOPY: CPT

## 2021-04-05 PROCEDURE — G8918 PT W/O PREOP ORDER IV AB PRO: HCPCS

## 2021-04-05 PROCEDURE — 45378 DIAGNOSTIC COLONOSCOPY: CPT | Mod: 53 | Performed by: SURGERY

## 2021-04-05 PROCEDURE — G8907 PT DOC NO EVENTS ON DISCHARG: HCPCS

## 2021-04-05 RX ORDER — SODIUM, POTASSIUM,MAG SULFATES 17.5-3.13G
2 SOLUTION, RECONSTITUTED, ORAL ORAL SEE ADMIN INSTRUCTIONS
Qty: 354 ML | Refills: 0 | Status: SHIPPED | OUTPATIENT
Start: 2021-04-05 | End: 2023-12-01

## 2021-04-05 RX ORDER — FENTANYL CITRATE 50 UG/ML
INJECTION, SOLUTION INTRAMUSCULAR; INTRAVENOUS PRN
Status: DISCONTINUED | OUTPATIENT
Start: 2021-04-05 | End: 2021-04-05 | Stop reason: HOSPADM

## 2021-04-05 RX ORDER — LIDOCAINE 40 MG/G
CREAM TOPICAL
Status: DISCONTINUED | OUTPATIENT
Start: 2021-04-05 | End: 2021-04-06 | Stop reason: HOSPADM

## 2021-04-05 RX ORDER — ONDANSETRON 2 MG/ML
4 INJECTION INTRAMUSCULAR; INTRAVENOUS
Status: DISCONTINUED | OUTPATIENT
Start: 2021-04-05 | End: 2021-04-06 | Stop reason: HOSPADM

## 2021-04-05 NOTE — OR NURSING
Colonoscopy was incomplete due to poor prep.  Dr. Sheikh will talk with patient about rescheduling and additional prep needed.

## 2021-04-06 ENCOUNTER — ANESTHESIA (OUTPATIENT)
Dept: SURGERY | Facility: AMBULATORY SURGERY CENTER | Age: 61
End: 2021-04-06

## 2021-04-06 ENCOUNTER — ANESTHESIA EVENT (OUTPATIENT)
Dept: SURGERY | Facility: AMBULATORY SURGERY CENTER | Age: 61
End: 2021-04-06

## 2021-04-06 ENCOUNTER — HOSPITAL ENCOUNTER (OUTPATIENT)
Facility: AMBULATORY SURGERY CENTER | Age: 61
Discharge: HOME | End: 2021-04-06
Attending: INTERNAL MEDICINE | Admitting: INTERNAL MEDICINE
Payer: COMMERCIAL

## 2021-04-06 VITALS
DIASTOLIC BLOOD PRESSURE: 68 MMHG | TEMPERATURE: 96.8 F | RESPIRATION RATE: 16 BRPM | SYSTOLIC BLOOD PRESSURE: 126 MMHG | OXYGEN SATURATION: 100 %

## 2021-04-06 VITALS — HEART RATE: 65 BPM

## 2021-04-06 LAB — COLONOSCOPY: NORMAL

## 2021-04-06 PROCEDURE — G8918 PT W/O PREOP ORDER IV AB PRO: HCPCS

## 2021-04-06 PROCEDURE — 88305 TISSUE EXAM BY PATHOLOGIST: CPT | Performed by: PATHOLOGY

## 2021-04-06 PROCEDURE — 45380 COLONOSCOPY AND BIOPSY: CPT

## 2021-04-06 PROCEDURE — G8907 PT DOC NO EVENTS ON DISCHARG: HCPCS

## 2021-04-06 RX ORDER — LIDOCAINE HYDROCHLORIDE 20 MG/ML
INJECTION, SOLUTION INFILTRATION; PERINEURAL PRN
Status: DISCONTINUED | OUTPATIENT
Start: 2021-04-06 | End: 2021-04-06

## 2021-04-06 RX ORDER — SODIUM CHLORIDE, SODIUM LACTATE, POTASSIUM CHLORIDE, CALCIUM CHLORIDE 600; 310; 30; 20 MG/100ML; MG/100ML; MG/100ML; MG/100ML
INJECTION, SOLUTION INTRAVENOUS CONTINUOUS
Status: DISCONTINUED | OUTPATIENT
Start: 2021-04-06 | End: 2021-04-07 | Stop reason: HOSPADM

## 2021-04-06 RX ORDER — LIDOCAINE 40 MG/G
CREAM TOPICAL
Status: DISCONTINUED | OUTPATIENT
Start: 2021-04-06 | End: 2021-04-07 | Stop reason: HOSPADM

## 2021-04-06 RX ORDER — PROPOFOL 10 MG/ML
INJECTION, EMULSION INTRAVENOUS CONTINUOUS PRN
Status: DISCONTINUED | OUTPATIENT
Start: 2021-04-06 | End: 2021-04-06

## 2021-04-06 RX ORDER — PROPOFOL 10 MG/ML
INJECTION, EMULSION INTRAVENOUS PRN
Status: DISCONTINUED | OUTPATIENT
Start: 2021-04-06 | End: 2021-04-06

## 2021-04-06 RX ADMIN — SODIUM CHLORIDE, SODIUM LACTATE, POTASSIUM CHLORIDE, CALCIUM CHLORIDE: 600; 310; 30; 20 INJECTION, SOLUTION INTRAVENOUS at 11:57

## 2021-04-06 RX ADMIN — PROPOFOL 50 MG: 10 INJECTION, EMULSION INTRAVENOUS at 12:00

## 2021-04-06 RX ADMIN — LIDOCAINE HYDROCHLORIDE 60 MG: 20 INJECTION, SOLUTION INFILTRATION; PERINEURAL at 12:00

## 2021-04-06 RX ADMIN — PROPOFOL 100 MCG/KG/MIN: 10 INJECTION, EMULSION INTRAVENOUS at 12:02

## 2021-04-06 NOTE — CONSULTS
Pre-procedure H and P    HPI    Freeman presents today for colonoscopy - had attempted yesterday but poor prep - repeated suprep and now back today.  Does endorse new onset constipation - improved after adding miralax.    Last complete colonoscopy in 2019 with small tubular adenomas and poor prep.  Previous colonoscopy in 2013 also with inadequate prep.     ROS:    No fevers or chills  No weight loss  No blurry vision, double vision or change in vision  No sore throat  No lymphadenopathy  No headache, paraesthesias, or weakness in a limb  No shortness of breath or wheezing  No chest pain or pressure  No arthralgias or myalgias  No rashes or skin changes  No odynophagia or dysphagia  No BRBPR, hematochezia, melena  No dysuria, frequency or urgency  No hot/cold intolerance or polyria  No anxiety or depression    PROBLEM LIST  Patient Active Problem List    Diagnosis Date Noted     Osteoarthritis of both knees, unspecified osteoarthritis type 08/26/2020     Priority: Medium     Gastroesophageal reflux disease with esophagitis 01/04/2018     Priority: Medium     Family history of malignant melanoma of skin 09/19/2017     Priority: Medium     ACP (advance care planning) 03/09/2016     Priority: Medium     Advance Care Planning 3/9/2016: ACP Review of Chart / Resources Provided:  Reviewed chart for advance care plan.  Freeman Velazquez has no plan or code status on file. Discussed available resources and provided with information. Confirmed code status reflects current choices pending further ACP discussions.  Confirmed/documented legally designated decision makers.  Added by Jennifer Platt             Family history of malignant neoplasm of prostate 01/19/2012     Priority: Medium       PERTINENT PAST MEDICAL HISTORY:  Past Medical History:   Diagnosis Date     Arthritis      Distal radius fracture 7/20/2011     Renal colic        PREVIOUS SURGERIES:  Past Surgical History:   Procedure Laterality Date      ARTHROSCOPY KNEE BILATERAL       COLONOSCOPY  4/16/2013    Procedure: COLONOSCOPY;;  Surgeon: Lewis Metcalf MD;  Location: [facility]     COLONOSCOPY N/A 1/24/2019    Procedure: Combined Colonoscopy, Single Or Multiple Biopsy/Polypectomy By Biopsy;  Surgeon: Javier Judge MD;  Location: [facility]     COLONOSCOPY WITH CO2 INSUFFLATION N/A 1/24/2019    Procedure: COLONOSCOPY WITH CO2 INSUFFLATION;  Surgeon: Javier Judge MD;  Location: [facility]         ALLERGIES:     Allergies   Allergen Reactions     Tetracycline Shortness Of Breath     Erythromycin        PERTINENT MEDICATIONS:    Current Outpatient Medications:      bisacodyl (DULCOLAX) 5 MG EC tablet, Take 1 tablet (5 mg) by mouth See Admin Instructions -Day prior to procedure take 1 tablet bisacodyl at 12:00., Disp: 1 tablet, Rfl: 0     Na Sulfate-K Sulfate-Mg Sulf (SUPREP BOWEL PREP KIT) solution, Take 354 mLs (2 Bottles) by mouth See Admin Instructions Take as directed., Disp: 354 mL, Rfl: 0     Na Sulfate-K Sulfate-Mg Sulf (SUPREP BOWEL PREP KIT) solution, Take 354 mLs (2 Bottles) by mouth See Admin Instructions -Evening before procedure complete steps 1 through 4 (see package) using one 6 ounce bottle before bed.  Morning of procedure, repeat steps 1 through 4 using the other 6 ounce bottle., Disp: 354 mL, Rfl: 0     Naproxen Sodium (ALEVE PO), Take by mouth as needed for moderate pain, Disp: , Rfl:      ondansetron (ZOFRAN) 4 MG tablet, Take 1 tablet (4 mg) by mouth every 6 hours as needed for nausea, Disp: 10 tablet, Rfl: 0     polyethylene glycol (GOLYTELY) 236 g suspension, Take 4,000 mLs by mouth See Admin Instructions -Mix GoLytely as directed on container.  At 6:00 PM, drink an 8 Oz glass of solution and continue drinking 8 Oz glass every 15 minutes until bottle is empty., Disp: 4000 mL, Rfl: 0     polyethylene glycol (MIRALAX) 17 GM/Dose powder, Take 17 g (1 capful) by mouth daily, Disp: 225 g, Rfl: 0     Simethicone 125 MG TABS, Take  125 mg by mouth See Admin Instructions --Take tablet after finishing second half of Suprep with half a glass of water., Disp: 1 tablet, Rfl: 0     Simethicone 125 MG TABS, Take 125 mg by mouth See Admin Instructions -Take tablet after finishing second half of Suprep with half a glass of water., Disp: 1 tablet, Rfl: 0     Simethicone 125 MG TABS, Take 125 mg by mouth See Admin Instructions, Disp: 1 tablet, Rfl: 0    Current Facility-Administered Medications:      lactated ringers infusion, , Intravenous, Continuous, Neo Jameson MD     lactated ringers infusion, , Intravenous, Continuous, Neo Jameson MD     lidocaine (LMX4) kit, , Topical, Q1H PRN, Neo Jameson MD     lidocaine 1 % 0.1-1 mL, 0.1-1 mL, Other, Q1H PRN, Neo Jameson MD     lidocaine 1 % injection 5 mL, 5 mL, , , Darren Jordan MD, 5 mL at 08/17/20 1403     methylPREDNISolone (DEPO-MEDROL) injection 80 mg, 80 mg, , , Darren Jordan MD, 80 mg at 08/17/20 1403     sodium chloride (PF) 0.9% PF flush 3 mL, 3 mL, Intracatheter, Q8H, Neo Jameson MD     sodium chloride (PF) 0.9% PF flush 3 mL, 3 mL, Intracatheter, q1 min prn, Neo Jameson MD    SOCIAL HISTORY:  Social History     Socioeconomic History     Marital status:      Spouse name: Not on file     Number of children: Not on file     Years of education: Not on file     Highest education level: Not on file   Occupational History     Employer: [employer]   Social Needs     Financial resource strain: Not on file     Food insecurity     Worry: Not on file     Inability: Not on file     Transportation needs     Medical: Not on file     Non-medical: Not on file   Tobacco Use     Smoking status: Never Smoker     Smokeless tobacco: Never Used   Substance and Sexual Activity     Alcohol use: Yes     Comment: rare, one drink in 6 months     Drug use: No     Sexual activity: Not Currently     Partners: Female      Comment: many years   Lifestyle     Physical activity     Days per week: Not on file     Minutes per session: Not on file     Stress: Not on file   Relationships     Social connections     Talks on phone: Not on file     Gets together: Not on file     Attends Christianity service: Not on file     Active member of club or organization: Not on file     Attends meetings of clubs or organizations: Not on file     Relationship status: Not on file     Intimate partner violence     Fear of current or ex partner: Not on file     Emotionally abused: Not on file     Physically abused: Not on file     Forced sexual activity: Not on file   Other Topics Concern     Parent/sibling w/ CABG, MI or angioplasty before 65F 55M? No   Social History Narrative     Not on file       FAMILY HISTORY:  Family History   Problem Relation Age of Onset     Breast Cancer Mother      Allergies Mother      Anesthesia Reaction Mother      Arthritis Mother      Blood Disease Mother      Cardiovascular Father      Eye Disorder Father      Heart Disease Father      Cancer Father         multiple skin cancers     Macular Degeneration Father      Glaucoma Father      Diabetes Sister      Diabetes Maternal Grandmother      Thyroid Disease No family hx of      Cerebrovascular Disease No family hx of      Hypertension No family hx of        Past/family/social history reviewed and no changes    PHYSICAL EXAMINATION:  Constitutional: aaox3, cooperative, pleasant, not dyspneic/diaphoretic, no acute distress  Vitals reviewed: BP (!) 148/71   Temp 97.2  F (36.2  C) (Temporal)   SpO2 98%   Wt:   Wt Readings from Last 2 Encounters:   03/08/21 86.2 kg (190 lb)   01/10/20 91.5 kg (201 lb 12.8 oz)      Eyes: Sclera anicteric/injected  Ears/nose/mouth/throat: Normal oropharynx without ulcers or exudate, mucus membranes moist, hearing intact  Neck: supple, thyroid normal size  CV: No edema, RRR,   Respiratory: Unlabored breathing CTA b/l  Lymph: No axillary,  submandibular, supraclavicular or inguinal lymphadenopathy  Abd: Soft Nondistended,, no hepatosplenomegaly, nontender, no peritoneal signs  Skin: warm, perfused, no jaundice  Psych: Normal affect  MSK: Normal gait      PERTINENT STUDIES:  Most recent CBC:  Recent Labs   Lab Test 03/08/21  1457 01/10/20  1304   WBC 5.2 5.8   HGB 15.3 15.2   HCT 46.5 47.1    334     Most recent hepatic panel:  Recent Labs   Lab Test 03/08/21  1457 01/10/20  1304   ALT 32 33   AST 23 17     Most recent creatinine:  Recent Labs   Lab Test 01/10/20  1304 01/04/18  1543   CR 0.91 0.89       ASSESSMENT/PLAN:    Colonoscopy with MAC today for further evaluation

## 2021-04-06 NOTE — ANESTHESIA PREPROCEDURE EVALUATION
Anesthesia Pre-Procedure Evaluation    Patient: Freeman Velazquez   MRN: [id number] : 1960        Preoperative Diagnosis: Screen for colon cancer [Z12.11]   Procedure : Procedure(s):  COLONOSCOPY, WITH CO2 INSUFFLATION     Past Medical History:   Diagnosis Date     Arthritis      Distal radius fracture 2011     Renal colic       Past Surgical History:   Procedure Laterality Date     ARTHROSCOPY KNEE BILATERAL       COLONOSCOPY  2013    Procedure: COLONOSCOPY;;  Surgeon: Lewis Metcalf MD;  Location: [facility]     COLONOSCOPY N/A 2019    Procedure: Combined Colonoscopy, Single Or Multiple Biopsy/Polypectomy By Biopsy;  Surgeon: Javier Judge MD;  Location: [facility]     COLONOSCOPY WITH CO2 INSUFFLATION N/A 2019    Procedure: COLONOSCOPY WITH CO2 INSUFFLATION;  Surgeon: Javier Judge MD;  Location: [facility]      Allergies   Allergen Reactions     Tetracycline Shortness Of Breath     Erythromycin       Social History     Tobacco Use     Smoking status: Never Smoker     Smokeless tobacco: Never Used   Substance Use Topics     Alcohol use: Yes     Comment: rare, one drink in 6 months      Wt Readings from Last 1 Encounters:   21 86.2 kg (190 lb)        Anesthesia Evaluation            ROS/MED HX  ENT/Pulmonary:  - neg pulmonary ROS     Neurologic:  - neg neurologic ROS     Cardiovascular:  - neg cardiovascular ROS     METS/Exercise Tolerance:     Hematologic:  - neg hematologic  ROS     Musculoskeletal:  - neg musculoskeletal ROS (+) arthritis,     GI/Hepatic:  - neg GI/hepatic ROS   (+) GERD,     Renal/Genitourinary:  - neg Renal ROS     Endo:  - neg endo ROS     Psychiatric/Substance Use:  - neg psychiatric ROS     Infectious Disease:  - neg infectious disease ROS     Malignancy:  - neg malignancy ROS     Other:  - neg other ROS          Physical Exam    Airway  airway exam normal      Mallampati: II   TM distance: > 3 FB   Neck ROM: full   Mouth opening: > 3  cm    Respiratory Devices and Support         Dental  no notable dental history         Cardiovascular          Rhythm and rate: regular and normal     Pulmonary   pulmonary exam normal        breath sounds clear to auscultation           OUTSIDE LABS:  CBC:   Lab Results   Component Value Date    WBC 5.2 03/08/2021    WBC 5.8 01/10/2020    HGB 15.3 03/08/2021    HGB 15.2 01/10/2020    HCT 46.5 03/08/2021    HCT 47.1 01/10/2020     03/08/2021     01/10/2020     BMP:   Lab Results   Component Value Date     01/10/2020     01/04/2018    POTASSIUM 3.5 01/10/2020    POTASSIUM 3.9 01/04/2018    CHLORIDE 108 01/10/2020    CHLORIDE 103 01/04/2018    CO2 28 01/10/2020    CO2 28 01/04/2018    BUN 17 01/10/2020    BUN 15 01/04/2018    CR 0.91 01/10/2020    CR 0.89 01/04/2018    GLC 93 01/10/2020    GLC 82 01/04/2018     COAGS: No results found for: PTT, INR, FIBR  POC: No results found for: BGM, HCG, HCGS  HEPATIC:   Lab Results   Component Value Date    ALBUMIN 4.1 03/08/2021    PROTTOTAL 7.7 03/08/2021    ALT 32 03/08/2021    AST 23 03/08/2021    ALKPHOS 103 03/08/2021    BILITOTAL 0.9 03/08/2021     OTHER:   Lab Results   Component Value Date    MARIAA 8.8 01/10/2020    LIPASE 84 03/08/2021    AMYLASE 44 01/04/2018    TSH 0.66 03/08/2021       Anesthesia Plan    ASA Status:  1   NPO Status:  NPO Appropriate    Anesthesia Type: MAC.     - Reason for MAC: immobility needed, straight local not clinically adequate   Induction: Intravenous.   Maintenance: TIVA.        Consents    Anesthesia Plan(s) and associated risks, benefits, and realistic alternatives discussed. Questions answered and patient/representative(s) expressed understanding.     - Discussed with:  Patient      - Extended Intubation/Ventilatory Support Discussed: No.      - Patient is DNR/DNI Status: No    Use of blood products discussed: No .     Postoperative Care    Pain management: IV analgesics, Oral pain medications, Multi-modal  analgesia.   PONV prophylaxis: Ondansetron (or other 5HT-3), Background Propofol Infusion     Comments:                Neo Jameson MD

## 2021-04-06 NOTE — ANESTHESIA CARE TRANSFER NOTE
Patient: Freeman Velazquez    Procedure(s):  COLONOSCOPY, WITH CO2 INSUFFLATION  Colonoscopy, With Polypectomy And Biopsy    Diagnosis: Screen for colon cancer [Z12.11]  Diagnosis Additional Information: No value filed.    Anesthesia Type:   MAC     Note:    Oropharynx: oropharynx clear of all foreign objects  Level of Consciousness: awake  Oxygen Supplementation: room air    Independent Airway: airway patency satisfactory and stable  Dentition: dentition unchanged  Vital Signs Stable: post-procedure vital signs reviewed and stable  Report to RN Given: handoff report given  Patient transferred to: Phase II    Handoff Report: Identifed the Patient, Identified the Reponsible Provider, Reviewed the pertinent medical history, Discussed the surgical course, Reviewed Intra-OP anesthesia mangement and issues during anesthesia, Set expectations for post-procedure period and Allowed opportunity for questions and acknowledgement of understanding      Vitals: (Last set prior to Anesthesia Care Transfer)  CRNA VITALS  4/6/2021 1210 - 4/6/2021 1242      4/6/2021             Pulse:  71    SpO2:  96 %        Electronically Signed By: MARK Hendrix CRNA  April 6, 2021  12:42 PM

## 2021-04-06 NOTE — ANESTHESIA POSTPROCEDURE EVALUATION
Patient: Freeman Velazquez    Procedure(s):  COLONOSCOPY, WITH CO2 INSUFFLATION  Colonoscopy, With Polypectomy And Biopsy    Diagnosis:Screen for colon cancer [Z12.11]  Diagnosis Additional Information: No value filed.    Anesthesia Type:  MAC    Note:  Disposition: Outpatient   Postop Pain Control: Uneventful            Sign Out: Well controlled pain   PONV: No   Neuro/Psych: Uneventful            Sign Out: Acceptable/Baseline neuro status   Airway/Respiratory: Uneventful            Sign Out: Acceptable/Baseline resp. status   CV/Hemodynamics: Uneventful            Sign Out: Acceptable CV status   Other NRE: NONE   DID A NON-ROUTINE EVENT OCCUR? No         Last vitals:  Vitals:    04/06/21 1245 04/06/21 1300 04/06/21 1317   BP: 107/61 129/65 126/68   Resp: 16 16 16   Temp:   36  C (96.8  F)   SpO2: 100% 100% 100%       Last vitals prior to Anesthesia Care Transfer:  CRNA VITALS  4/6/2021 1210 - 4/6/2021 1310      4/6/2021             Pulse:  71    SpO2:  96 %          Electronically Signed By: Neo Jameson MD  April 6, 2021  2:29 PM

## 2021-04-08 LAB — COPATH REPORT: NORMAL

## 2021-05-26 VITALS — SYSTOLIC BLOOD PRESSURE: 131 MMHG | TEMPERATURE: 98.5 F | DIASTOLIC BLOOD PRESSURE: 73 MMHG | HEART RATE: 83 BPM

## 2021-05-27 VITALS — DIASTOLIC BLOOD PRESSURE: 72 MMHG | HEART RATE: 60 BPM | TEMPERATURE: 98.2 F | SYSTOLIC BLOOD PRESSURE: 116 MMHG

## 2021-05-27 VITALS — DIASTOLIC BLOOD PRESSURE: 72 MMHG | HEART RATE: 67 BPM | TEMPERATURE: 98.6 F | SYSTOLIC BLOOD PRESSURE: 135 MMHG

## 2021-06-04 VITALS
WEIGHT: 193 LBS | HEART RATE: 75 BPM | SYSTOLIC BLOOD PRESSURE: 114 MMHG | BODY MASS INDEX: 24.12 KG/M2 | DIASTOLIC BLOOD PRESSURE: 72 MMHG | TEMPERATURE: 98 F

## 2021-06-04 VITALS — HEIGHT: 75 IN | WEIGHT: 193 LBS | BODY MASS INDEX: 24 KG/M2

## 2021-06-04 VITALS — HEIGHT: 72 IN | WEIGHT: 196 LBS | BODY MASS INDEX: 26.55 KG/M2

## 2021-06-04 VITALS — BODY MASS INDEX: 25.22 KG/M2 | HEIGHT: 75 IN

## 2021-06-05 NOTE — ANESTHESIA POSTPROCEDURE EVALUATION
Patient: Freeman Velazquez  CYSTOSCOPY, WITH FLEXIBLE URETEROSCOPY, RETROGRADES, AND STENT INSERTION RIGHT  Anesthesia type: general    Patient location: PACU  Last vitals:   Vitals Value Taken Time   /74 1/21/2020 11:00 AM   Temp 36.2  C (97.2  F) 1/21/2020 10:00 AM   Pulse 49 1/21/2020 11:08 AM   Resp 18 1/21/2020 10:48 AM   SpO2 99 % 1/21/2020 11:08 AM   Vitals shown include unvalidated device data.  Post vital signs: stable  Level of consciousness: awake and responds to simple questions  Post-anesthesia pain: pain controlled  Post-anesthesia nausea and vomiting: no  Pulmonary: unassisted, return to baseline  Cardiovascular: stable and blood pressure at baseline  Hydration: adequate  Anesthetic events: no    QCDR Measures:  ASA# 11 - Charley-op Cardiac Arrest: ASA11B - Patient did NOT experience unanticipated cardiac arrest  ASA# 12 - Charley-op Mortality Rate: ASA12B - Patient did NOT die  ASA# 13 - PACU Re-Intubation Rate: ASA13B - Patient did NOT require a new airway mgmt  ASA# 10 - Composite Anes Safety: ASA10A - No serious adverse event    Additional Notes:

## 2021-06-05 NOTE — PROGRESS NOTES
Assessment/Plan:        Diagnoses and all orders for this visit:    Calculus of kidney  -     Urinalysis Macroscopic  -     Case Request: CYSTOSCOPY, WITH FLEXIBLE URETEROSCOPIC CALCULUS REMOVAL LASER LITHOTRIPSY  AND STENT INSERTION RIGHT; Standing  -     Verify informed consent; Standing  -     Diet NPO; Standing  -     Place sequential compression device; Standing  -     XR Retrograde Pyelogram W or WO KUB Intraoperative; Standing  -     levoFLOXacin 500 mg/100 mL IVPB 500 mg (LEVAQUIN)  -     ketorolac injection 15 mg (TORADOL)  -     acetaminophen tablet 1,000 mg (TYLENOL)  -     sterile water IR irrigation solution 3,000 mL  -     tamsulosin (FLOMAX) 0.4 mg cap; Take 1 capsule (0.4 mg total) by mouth daily for 14 days.  Dispense: 14 capsule; Refill: 1  -     oxyCODONE (ROXICODONE) 5 MG immediate release tablet; Take 1-2 tablets (5-10 mg total) by mouth every 4 (four) hours as needed for pain.  Dispense: 12 tablet; Refill: 0  -     Patient Stated Goal: Know what to expect after surgery  -     Ureteroscopy Education  -     XR Abdomen AP; Standing  -     Case Request: CYSTOSCOPY, WITH FLEXIBLE URETEROSCOPIC CALCULUS REMOVAL LASER LITHOTRIPSY  AND STENT INSERTION RIGHT    Other orders  -     nitrofurantoin, macrocrystal-monohydrate, (MACROBID) 100 MG capsule; Take 100 mg by mouth 2 (two) times a day.      Stone Management Plan  [facility] 1/14/2020   Urinary Tract Infection No suspicion of infection   Renal Colic Well controlled symptoms   Renal Failure No suspicion of renal failure   Current CT date 1/10/2020   Right sided stones? Yes   R Number of ureteral stones No ureteral stones   R Number of kidney stones  3   R GSD of kidney stones 4 - 10   R Hydronephrosis None   R Stone Event New event   Diagnosis date 1/10/2020   Initial location of primary symptomatic stone Renal   Initial GSD of primary symptomatic stone 10   R Current Plan Clear   Clear rationale Symptomatic   Left sided stones? Yes   L  Number of ureteral stones No ureteral stones   L Number of kidney stones  4   L GSD of kidney stones < 2   L Hydronephrosis None   L Stone Event No current event   L Current Plan Observe   Observe rationale Limited stone burden with good prognosis for spontaneous passage         Subjective:      HPI  Mr. Freeman Velazquez is a 59 y.o.  male presenting to the [facility] [place] following recent [facility] ER visit for urolithiasis.    He is a remotely recurrent unidentified composition stone former who has not required stone clearance procedures. He has not previously participated in stone risk evaluation. He has no identified modifiable stone risk factors. He has identified non-modifiable stone risks including:  bilateral stones.    He was seen in ER 1/10/20 for bilateral low back pain right > left. The pain was more of a tightness and unprovoked. He had burning sensation in his lower abdomen and hematuria. He has history of kidney stone ~ 20 years ago with different symptoms. Workup was notable for CT reporting a 1 cm right renal stone and bilateral renal stones. Labs were unremarkable for evidence of infection or renal injury. He was sent home with macrobid.     He is asymptomatic at present. He denies symptoms of fever, chills, flank pain, nausea, vomiting, urinary frequency and dysuria.     CT scan from 1/10/20 is personally reviewed and demonstrates a nonobstructing 1 cm right renal pelvis stone. Additional, small bilateral renal stones.    Significant labs from presentation include moderate hematuria, no pyuria, negative nitrite, few bacteria, urine culture with no growth normal WBC, normal creatinine and normal potassium.    PLAN    60 yo M with remote hx of stone event, no prior surgical stone interventions. Recent hematuria with abdominal and back pain. Dominant right renal pelvis stone. Additional, small, bilateral renal stones.    Will proceed with ureterscopic stone clearance next week.  Risks and benefits were detailed of ureteroscopic stone clearance including potential issues of urinary or systemic infection, ureteral injury, inaccessible stone, incomplete stone clearance, multiple surgeries, and stent related symptoms of urgency, frequency and hematuria Patient verbalized understanding. Patient agrees with plan as discussed. Preoperative evaluation with primary care is not requested as the referring documentation is adequate.    For symptom control, he was prescribed oxycodone and Flomax. Over the counter symptom control medications of ibuprofen, Dramamine and Tylenol were recommended.    Patient also seen and examined by AARON Pendleton   Review of Systems  A 12 point comprehensive review of systems is negative except for HPI    Past Medical History:   Diagnosis Date     GERD (gastroesophageal reflux disease)      Kidney stone        Past Surgical History:   Procedure Laterality Date     KNEE SURGERY Bilateral        Current Outpatient Medications   Medication Sig Dispense Refill     nitrofurantoin, macrocrystal-monohydrate, (MACROBID) 100 MG capsule Take 100 mg by mouth 2 (two) times a day.       No current facility-administered medications for this visit.        Allergies   Allergen Reactions     Erythromycin Anaphylaxis     Tetracycline Anaphylaxis and Shortness Of Breath       Social History     Socioeconomic History     Marital status:      Spouse name: Not on file     Number of children: Not on file     Years of education: Not on file     Highest education level: Not on file   Occupational History     Occupation: Manager    Social Needs     Financial resource strain: Not on file     Food insecurity:     Worry: Not on file     Inability: Not on file     Transportation needs:     Medical: Not on file     Non-medical: Not on file   Tobacco Use     Smoking status: Never Smoker     Smokeless tobacco: Never Used     Tobacco comment: Maybe three cigars per year   Substance and  Sexual Activity     Alcohol use: Yes     Comment: occas     Drug use: Not on file     Sexual activity: Not on file   Lifestyle     Physical activity:     Days per week: Not on file     Minutes per session: Not on file     Stress: Not on file   Relationships     Social connections:     Talks on phone: Not on file     Gets together: Not on file     Attends [employer] service: Not on file     Active member of club or organization: Not on file     Attends meetings of clubs or organizations: Not on file     Relationship status: Not on file     Intimate partner violence:     Fear of current or ex partner: Not on file     Emotionally abused: Not on file     Physically abused: Not on file     Forced sexual activity: Not on file   Other Topics Concern     Not on file   Social History Narrative     Not on file       Family History   Problem Relation Age of Onset     Cancer Mother         breast     Cancer Father         skin     Heart disease Father      Urolithiasis Father      Diabetes Sister      Diabetes Maternal Grandmother      Gout Neg Hx      Clotting disorder Neg Hx        Objective:      Physical Exam  Vitals:    01/14/20 1352   BP: 114/72   Pulse: 75   Temp: 98  F (36.7  C)     General - well developed, well nourished, appropriate for age. Appears no distress at this time   Heart - regular rate and rhythm, no murmur  Respiratory - normal effort, clear to auscultation, good air entry without adventitious noises  Abdomen - slender soft, non-tender, no hepatosplenomegaly, no masses.   - no flank tenderness, no suprapubic tenderness, kidney and bladder non-palpable  MSK - normal spinal curvature. no spinal tenderness. normal gait. muscular strength intact.  Neurology - cranial nerves II-XII grossly intact, normal sensation, no unsteadiness  Skin - intact, no bruising, no gouty tophi  Psych - oriented to time, place, and person, normal mood and affect.    Labs  Urinalysis POC (Office):  Nitrite, UA   Date Value Ref  Range Status   01/14/2020 Negative Negative Final       Lab Urinalysis:  Blood, UA   Date Value Ref Range Status   01/14/2020 Small (!) Negative Final     Nitrite, UA   Date Value Ref Range Status   01/14/2020 Negative Negative Final     Leukocytes, UA   Date Value Ref Range Status   01/14/2020 Negative Negative Final     pH, UA   Date Value Ref Range Status   01/14/2020 5.0 5.0 - 8.0 Final    and Acute Labs CBC No results found for: WBC, HGB, PLT, C Reactive Protein  No results found for: CRP, Renal Panel  KSINo results found for: CREATININE, CRCLEARANCE, K, CALCIUM and Urine Culture  No results found for: CULTURE

## 2021-06-05 NOTE — ANESTHESIA PREPROCEDURE EVALUATION
Anesthesia Evaluation      Patient summary reviewed   No history of anesthetic complications     Airway   Mallampati: I  Neck ROM: full   Pulmonary - negative ROS and normal exam    breath sounds clear to auscultation  (-) not a smoker                         Cardiovascular - negative ROS and normal exam  Exercise tolerance: > or = 4 METS  (-) angina, murmur  Rhythm: regular  Rate: normal,    no murmur      Neuro/Psych - negative ROS     Endo/Other    (+) arthritis,   (-) no diabetes, hypothyroidism, hyperthyroidism, no obesity, not pregnant     GI/Hepatic/Renal    (+) hiatal hernia, GERD,   chronic renal disease,           Dental - normal exam                        Anesthesia Plan  Planned anesthetic: general endotracheal    ASA 2   Induction: intravenous   Anesthetic plan and risks discussed with: patient  Anesthesia plan special considerations: antiemetics,   Post-op plan: routine recovery

## 2021-06-05 NOTE — PATIENT INSTRUCTIONS - HE
Patient Stated Goal: Know what to expect after surgery  Ureteroscopy    Ureteroscopy is a procedure which is done for clearance of stones from the ureter, kidney or both. There are no incisions involved. The procedure involves your surgeon placing a small scope into your urethra. This is the opening where urine leaves your body.  The surgeon watches as they carefully guide the scope to the stone(s).  Modern flexible ureteroscopes can be used to reach virtually any location within the urinary tract.     The size, shape and location of the stone determines how best to treat the stone(s).  Whenever possible, stones are removed in one piece.  Larger stones need to be broken using a laser before removing in smaller pieces.  The goal is to remove all stones and stone fragments from that side of the body in a single treatment.  Complete stone clearance is an important step to prevent future kidney stone episodes.    Surgery:    Same day outpatient procedure    30-60 minutes    Procedure done in hospital surgical suite    General anesthesia (you will be asleep during the procedure)     Antibiotic prior to surgery to prevent infection    Physician will visit with you and respond to any questions or concerns and consent will be signed prior to going to the operating room    Risks:    Infection - Preoperative antibiotics should prevent new infections but it is possible that unanticipated bacteria may be introduced at time of surgery or that the stones were actually chronically infected before surgery      Injury - The ureter may be injured during this procedure.  This is most likely to happen if the ureter was very inflamed before surgery or if a stone is very tightly impacted.  The surgeon will not aggressively treat a stone if this creates a risk of injury.        Inaccessible Stones -A single procedure is effective in 95% of cases, but if your ureter is very narrow or your kidney stone is very impacted, a stent will be  placed and the procedure stopped.  In 1-2 weeks after the ureter has relaxed, the patient will be brought back to surgery and the procedure can be safely performed.      Incomplete stone clearance -Occasionally stone or stone fragments may not be completely cleared.  These may pass on their own, which may cause discomfort.  Our goal is to remove all possible stones and fragments.    Stent:      An internal soft tube will be placed between the kidney and the bladder while in surgery (after the stone is cleared). The stent will keep the kidney draining.    What should I expect?     It is common for a stent to cause some irritation and discomfort.   You may have:      The need to urinate suddenly     The need to urinate often     Pain during urination     A dull backache, which may get worse during urination     Blood stained urine (like fruit punch) and occasional small clots    It s important to remember the stent is necessary and only temporary. To feel more comfortable:      Drink more than you normally would but you do not have to constantly  flush your kidneys     Limiting your activity may decrease irritation or bleeding    Ibuprofen - 2 tablets every 6-8 hours     Use pain medications as directed.    When is the stent removed?    Most stents are removed within 5 days to 2 weeks after a procedure.     How is the stent removed?     Your stent will be removed in the [facility] with a small telescope and a grasping tool.  It usually takes less than 1 minute to remove the stent.    What should I expect after the stent is removed?     You should feel normal by the next day    Some patients find:    An increase in back pain about an hour after the stent is removed as the kidney fills up with urine before it starts to empty.  It can be as uncomfortable as your initial stone episode.  Taking pain medications before stent removal may be helpful, but you would need someone else to drive you to and from your  appointment.    Bladder symptoms usually disappear by the next morning.    Small amounts of blood in the urine may be seen occasionally for up to a week.    Diet:      After surgery, there are no dietary restrictions - Drink to thirst, there is no need to increase intake of fluids, as this may increase nausea symptoms. Try to eat smaller, more frequent snacks, instead of large meals.    Activity:    Many people return to work within 1-2 days. Fatigue is normal for a couple of weeks following surgery. With increased activity you may experience more discomfort and you may notice more blood in your urine.      Post-Operative Symptom Control    While you recover from your procedure, you can take steps to ease your recovery.    Medications that prevent further episodes of severe pain and help stones pass: Take these as prescribed on a regular basis even if you are NOT in pain      Ibuprofen (Advil or Motrin) - Is available over the counter Take 2 (200mg) tablets every 6 hours until the stone passes.  o prevents spasm of the ureter.    o Decreases pain      Dramamine - (drowsy version, non-generic formulation) Is available over the counter and decreases spasm of the ureter.  Take 50mg at bedtime every night until the stone passes. In addition, take every 6 hours as needed.  Dramamine:  o Decreases nausea  o Decreases acute pain  o Decreases recurrence of pain for next 24 hours  o Will help you sleep        *This medication will cause increased drowsiness, do not drive or operate machinery for 6 hours      Flomax- Studies show that Flomax decreases irritation from stents.   o Take every day with food until stone passes even if you do not have pain  o Flomax does not relieve pain.        *This medication may cause nasal congestion or light-headedness      Detrol ( Tolterodine) - After surgery Detrol may decrease stent irritation and pelvic pain  o Take as prescribed     *This medication may cause dry mouth, constipation or  blurry vision. Stop medication if unable to urinate.    Medication that are taken as needed to manage break through symptoms: Take these ONLY as required and hopefully not at all      Narcotics (Percocet, Vicodin, Dilaudid)- take as prescribed for severe pain unrelieved by ibuprofen and dramamine  o Take as prescribed for severe pain  o Narcotics have significant side effects and only  cover-up  pain. They have no effect on cause of pain.  o Common side effects:  - Confusion, disorientation and sedation - DO NOT DRIVE OR OPERATE MACHINERY WITHIN 24 HOURS  - Nausea - take Dramamine or Zofran  or Haldol to help control  - Constipation  - Sleep disturbances      Ondansetron (Zofran)-  o Take as prescribed  o Reserve for severe nausea  o May cause constipation, start over the counter Miralax if needed to treat this    Haldol-  o Take as prescribed  o Reserve for severe nausea    Warning Signs/Symptoms - Please call the [facility] [place] 24 hours a day at [phone] IMMEDIATELY if you experience any of these:    Fever greater than 100.1     Chills    Pain NOT CONTROLLED by pain medications    Heavy bleeding or large clots in urine (small clots can be normal)    Persistent nausea and/or vomiting    Post-Operative Follow up:    The stone(s) will be sent from surgery to a lab for composition analysis.  These results are usually available before a one month post-operative visit.  If you had laser treatment to break up your stone, you will usually be scheduled for a low dose CT scan prior to your one month appointment.  This scan allows your surgeon to confirm that all stone fragments were cleared at time of surgery and that there have been no complications.  These results along with possible labs and urine studies will help us develop an individualized plan to prevent new stones from forming and keep existing stones from enlarging.  This visit is usually scheduled about 1 month after your original surgery.    The  [facility] can respond to your questions or concerns 24 hours a day at [phone].

## 2021-06-05 NOTE — ANESTHESIA CARE TRANSFER NOTE
Last vitals:   Vitals:    02/04/20 1125   BP: 145/75   Pulse: 70   Resp: 16   Temp: 36.5  C (97.7  F)   SpO2: 98%     Patient's level of consciousness is drowsy  Spontaneous respirations: yes  Maintains airway independently: yes  Dentition unchanged: yes  Oropharynx: oropharynx clear of all foreign objects    QCDR Measures:  ASA# 20 - Surgical Safety Checklist: WHO surgical safety checklist completed prior to induction    PQRS# 430 - Adult PONV Prevention: 4558F - Pt received => 2 anti-emetic agents (different classes) preop & intraop  ASA# 8 - Peds PONV Prevention: NA - Not pediatric patient, not GA or 2 or more risk factors NOT present  PQRS# 424 - Charley-op Temp Management: 4559F - At least one body temp DOCUMENTED => 35.5C or 95.9F within required timeframe  PQRS# 426 - PACU Transfer Protocol: - Transfer of care checklist used  ASA# 14 - Acute Post-op Pain: ASA14B - Patient did NOT experience pain >= 7 out of 10

## 2021-06-05 NOTE — TELEPHONE ENCOUNTER
The past week he was going to have a procedure to have a kidney stone removed, unable to remove so a stent was placed by Dr. Alejo on 1/21/20  Having severe back pain, and lower abdominal pain to the right of the naval by 4 inches. Wraps around to the back. Rates the pain a 8-9/10.   Blood in the urine, painful urination.   Pain was irritating after the procedure, was manageable.  Alternating tylenol and ibuprofen. This afternoon the pain started to get worse.  The tylenol and ibuprofen are not helping the pain.   Pt did not take the oxycodone due to feeling nauseous. Did take it the first night.  Denies vomiting.   Taking Dramamine 3x daily.   Pt states he completed the Macrobid before the procedure.   Taking Flomax 1 tablet daily.     Reason for Disposition    [1] SEVERE pain (e.g., excruciating, scale 8-10) AND [2] present > 1 hour    [1] SEVERE pain (e.g., excruciating, scale 8-10) AND [2] not improved after pain medicine    Protocols used: FLANK PAIN-A-AH    Paged on-call provider for [facility], Dr. Alejo, at 8:51pm.    Take Oxycodone, could have blood clot blocking stent.   Keep up with ibuprofen, tylenol and dramamine.       Pt was advised of recommendations per Dr. Alejo. Pt stated understanding and will try the oxycodone, will continue the ibuprofen, tylenol and dramamine.   Pt to call back if symptoms worsen, if oxycodone is not helping pain at all or any new or worsening sx.   Meghan Mendez, RN   [facility] RN Triage

## 2021-06-05 NOTE — TELEPHONE ENCOUNTER
Patient called c/o stent pain. Patient has stent placement 1/21/2020 awaiting definitve stone clearance scheduled for 2/4/2020. Symptoms include: low back pain, and testicular pain. Pain has reached 9 at times. He has been alternating the tylenol, ibuprofen and dramamine with narcotics for breakthrough pain. Patient afebrile.     Instructed patient to take the Tylenol 500mg 2 tabs every 6 hours, ibuprofen 200mg 2 tabs every 6 hours and dramamamine 50mg at hs and every 6 hours as needed. He can take the narcotic if this  does not relieve the pain.   Patient verbalized understanding.    Patient was given the option of rescheduling his surgery for stone clearance this Friday but declined and will wait until net Tuesday.     Instructed patient to call if he had any additional concerns.

## 2021-06-05 NOTE — ANESTHESIA POSTPROCEDURE EVALUATION
Patient: Freeman Velazquez  Procedure(s):  CYSTOSCOPY, WITH FLEXIBLE URETEROSCOPIC CALCULUS REMOVAL LASER LITHOTRIPSY  AND STENT EXCHANGE RIGHT (Right)  Anesthesia type: general    Patient location: Phase II Recovery  Last vitals:   Vitals Value Taken Time   /76 2/4/2020 12:30 PM   Temp 36.4  C (97.5  F) 2/4/2020 12:07 PM   Pulse 72 2/4/2020 12:33 PM   Resp 16 2/4/2020 12:30 PM   SpO2 97 % 2/4/2020 12:33 PM   Vitals shown include unvalidated device data.  Post vital signs: stable  Level of consciousness: awake and responds to simple questions  Post-anesthesia pain: pain controlled  Post-anesthesia nausea and vomiting: no  Pulmonary: unassisted, return to baseline  Cardiovascular: stable and blood pressure at baseline  Hydration: adequate  Anesthetic events: no    QCDR Measures:  ASA# 11 - Charley-op Cardiac Arrest: ASA11B - Patient did NOT experience unanticipated cardiac arrest  ASA# 12 - Charley-op Mortality Rate: ASA12B - Patient did NOT die  ASA# 13 - PACU Re-Intubation Rate: ASA13B - Patient did NOT require a new airway mgmt  ASA# 10 - Composite Anes Safety: ASA10A - No serious adverse event    Additional Notes:

## 2021-06-05 NOTE — ANESTHESIA PREPROCEDURE EVALUATION
Anesthesia Evaluation      Patient summary reviewed   No history of anesthetic complications     Airway   Mallampati: II  Neck ROM: full   Pulmonary - normal exam                          Cardiovascular - normal exam   Neuro/Psych      Endo/Other       GI/Hepatic/Renal    (+) GERD,   chronic renal disease,           Dental - normal exam                        Anesthesia Plan  Planned anesthetic: general LMA    ASA 2   Induction: intravenous   Anesthetic plan and risks discussed with: patient  Anesthesia plan special considerations: antiemetics,   Post-op plan: routine recovery

## 2021-06-05 NOTE — ANESTHESIA CARE TRANSFER NOTE
Last vitals:   Vitals:    01/21/20 0926   BP: 98/56   Pulse: (!) 55   Resp: 12   Temp: 36.4  C (97.5  F)   SpO2: 100%     Patient's level of consciousness is drowsy  Spontaneous respirations: yes  Maintains airway independently: yes  Dentition unchanged: yes  Oropharynx: oral airway in place    QCDR Measures:  ASA# 20 - Surgical Safety Checklist: WHO surgical safety checklist completed prior to induction    PQRS# 430 - Adult PONV Prevention: 4558F - Pt received => 2 anti-emetic agents (different classes) preop & intraop  ASA# 8 - Peds PONV Prevention: NA - Not pediatric patient, not GA or 2 or more risk factors NOT present  PQRS# 424 - Charley-op Temp Management: 4559F - At least one body temp DOCUMENTED => 35.5C or 95.9F within required timeframe  PQRS# 426 - PACU Transfer Protocol: - Transfer of care checklist used  ASA# 14 - Acute Post-op Pain: ASA14B - Patient did NOT experience pain >= 7 out of 10

## 2021-06-05 NOTE — PROGRESS NOTES
Patient presents to the office today for [facility] emergency room visit follow-up.  Dominique Adorno RN

## 2021-06-06 NOTE — PROGRESS NOTES
Assessment/Plan:        Diagnoses and all orders for this visit:    Calculus of ureter  -     ketorolac injection 10 mg (TORADOL)  -     acetaminophen tablet 1,000 mg (TYLENOL)  -     Case Request: CYSTOSCOPY, WITH FLEXIBLE URETEROSCOPIC CALCULUS REMOVAL LASER LITHOTRIPSY  AND STENT INSERTION; Standing  -     levoFLOXacin 500 mg/100 mL IVPB 500 mg (LEVAQUIN)  -     ketorolac injection 15 mg (TORADOL)  -     acetaminophen tablet 1,000 mg (TYLENOL)  -     sterile water IR irrigation solution 3,000 mL  -     gabapentin capsule 300 mg (NEURONTIN)  -     Case Request: CYSTOSCOPY, WITH FLEXIBLE URETEROSCOPIC CALCULUS REMOVAL LASER LITHOTRIPSY  AND STENT INSERTION  -     tamsulosin (FLOMAX) 0.4 mg cap; Take 1 capsule (0.4 mg total) by mouth daily for 14 days.  Dispense: 14 capsule; Refill: 1  -     oxyCODONE (ROXICODONE) 5 MG immediate release tablet; Take 1-2 tablets (5-10 mg total) by mouth every 4 (four) hours as needed for pain.  Dispense: 12 tablet; Refill: 0  -     Patient Stated Goal: Know what to expect after surgery  -     Ureteroscopy Education    Calculus of kidney  -     Urinalysis Macroscopic    Renal colic    Other orders  -     ketorolac (TORADOL) 15 mg/mL injection  -     acetaminophen (TYLENOL) 500 MG tablet  -     Verify informed consent; Standing  -     Diet NPO; Standing  -     Place sequential compression device; Standing  -     XR Retrograde Pyelogram W or WO KUB Intraoperative; Standing      Stone Management Plan  [facility] Stone Management 1/14/2020 2/10/2020 3/10/2020   Urinary Tract Infection No suspicion of infection No suspicion of infection No suspicion of infection   Renal Colic Well controlled symptoms Well controlled symptoms Well controlled symptoms   Renal Failure No suspicion of renal failure No suspicion of renal failure No suspicion of renal failure   Current CT date 1/10/2020 - 3/10/2020   Right sided stones? Yes - Yes   R Number of ureteral stones No ureteral stones - 1   R GSD of ureteral  stones - - 4   R Location of ureteral stone - - Distal   R Number of kidney stones  3 - 2   R GSD of kidney stones 4 - 10 - < 2   R Hydronephrosis None - None   R Stone Event New event Established event Established event   Diagnosis date 1/10/2020 - -   Initial location of primary symptomatic stone Renal - -   Initial GSD of primary symptomatic stone 10 - -   R Post-op status - Stent Removal 2 - 4 mm   R Current Plan Clear - Clear   Clear rationale Symptomatic - Other   Left sided stones? Yes - Yes   L Number of ureteral stones No ureteral stones - 1   L GSD of ureteral stones - - 3   L Location of ureteral stone - - Distal   L Number of kidney stones  4 - 3   L GSD of kidney stones < 2 - < 2   L Hydronephrosis None - Mild   L Stone Event No current event No current event New event   Diagnosis date - - 3/10/2020   Initial location of primary symptomatic stone - - Distal   Initial GSD of primary symptomatic stone - - 3   L Current Plan Observe - Clear   Clear rationale - - Symptomatic   Observe rationale Limited stone burden with good prognosis for spontaneous passage - -         Subjective:      HPI  Mr. Freeman Velazquez is a 60 y.o.  male returning to the [facility] [place] for late postoperative follow-up.     He returns status post staged Right ureteroscopic laser lithotripsy for renal stone. He has had no unanticipated events.     He was prompted to come in earlier than his afternoon appointment due to progressing left abdominal and left flank pain. The pain awoke him early this morning. It was initially intermittent and mild but became more bothersome through the day. He took aleve with no relief. He had associated nausea and vomiting. He is currently having 5/10 left flank pain. He denies symptoms of fever, chills, urinary frequency and dysuria.    New CT scan was personally reviewed and demonstrates clearance of right renal stone. There is a new nonobstructing 4 mm right distal ureteral  stone. In addition, previous 3 mm left upper pole renal stone now within left distal ureter. Small bilateral renal stones.    Stone composition was 100% calcium oxalate.     PLAN    60 yo M with remote hx of stone event s/p staged ureteroscopy for clearance of dominant right renal pelvis stone. Acute left renal colic with minimally obstructing left distal ureteral stone. Residual, nonobstructing right distal ureteral stone. Small, bilateral renal stones.    Provided in  of toradol and tylenol for pain. On exit, pain is improved to 1/10.    Will proceed with bilateral ureteroscopic stone clearance this Friday. Risks and benefits were detailed of ureteroscopic stone clearance including potential issues of urinary or systemic infection, ureteral injury, inaccessible stone, incomplete stone clearance, multiple surgeries, and stent related symptoms of urgency, frequency and hematuria. Current documentation is adequate for preoperative history and physical.    Provided refills of flomax and oxycodone.    Patient also seen and examined by Mela Andrew PA-C       ROS   Review of systems is negative except for HPI.    Past Medical History:   Diagnosis Date     GERD (gastroesophageal reflux disease)      Kidney stone        Past Surgical History:   Procedure Laterality Date     KNEE SURGERY Bilateral      [place] CYSTOSCOPY,INSERT URETERAL STENT Right 1/21/2020    Procedure: CYSTOSCOPY, WITH FLEXIBLE URETEROSCOPY, RETROGRADES, AND STENT INSERTION RIGHT;  Surgeon: Wil Alejo MD;  Location: [facility];  Service: Urology       Current Outpatient Medications   Medication Sig Dispense Refill     acetaminophen (TYLENOL) 500 MG tablet Take 1,000 mg by mouth every 6 (six) hours as needed for pain.       ibuprofen (ADVIL,MOTRIN) 200 MG tablet Take 400 mg by mouth every 6 (six) hours as needed for pain.       oxyCODONE (ROXICODONE) 5 MG immediate release tablet Take 1-2 tablets (5-10 mg total) by mouth  every 4 (four) hours as needed for pain. 12 tablet 0     oxyCODONE (ROXICODONE) 5 MG immediate release tablet Take 1-2 tablets (5-10 mg total) by mouth every 4 (four) hours as needed for pain. 12 tablet 0     tamsulosin (FLOMAX) 0.4 mg cap Take 1 capsule (0.4 mg total) by mouth daily for 14 days. 14 capsule 1     Current Facility-Administered Medications   Medication Dose Route Frequency Provider Last Rate Last Dose     acetaminophen (TYLENOL) 500 MG tablet              ketorolac (TORADOL) 15 mg/mL injection              levoFLOXacin tablet 500 mg (LEVAQUIN)  500 mg Oral Once Wil Alejo MD           Allergies   Allergen Reactions     Erythromycin Anaphylaxis     Tetracycline Anaphylaxis and Shortness Of Breath     Cranberry        Social History     Socioeconomic History     Marital status:      Spouse name: Not on file     Number of children: Not on file     Years of education: Not on file     Highest education level: Not on file   Occupational History     Occupation: Manager    Social Needs     Financial resource strain: Not on file     Food insecurity     Worry: Not on file     Inability: Not on file     Transportation needs     Medical: Not on file     Non-medical: Not on file   Tobacco Use     Smoking status: Never Smoker     Smokeless tobacco: Never Used     Tobacco comment: Maybe three cigars per year   Substance and Sexual Activity     Alcohol use: Not Currently     Comment: occas     Drug use: Never     Sexual activity: Not on file   Lifestyle     Physical activity     Days per week: Not on file     Minutes per session: Not on file     Stress: Not on file   Relationships     Social connections     Talks on phone: Not on file     Gets together: Not on file     Attends Buddhist service: Not on file     Active member of club or organization: Not on file     Attends meetings of clubs or organizations: Not on file     Relationship status: Not on file     Intimate partner violence     Fear of  current or ex partner: Not on file     Emotionally abused: Not on file     Physically abused: Not on file     Forced sexual activity: Not on file   Other Topics Concern     Not on file   Social History Narrative     Not on file       Family History   Problem Relation Age of Onset     Cancer Mother         breast     Cancer Father         skin     Heart disease Father      Urolithiasis Father      Diabetes Sister      Diabetes Maternal Grandmother      Gout Neg Hx      Clotting disorder Neg Hx      Objective:      Physical Exam  Vitals:    03/10/20 1234   BP: 135/72   Pulse: 67   Temp: 98.6  F (37  C)     General - well developed, well nourished, appropriate for age. Appears no distress at this time   Heart - regular rate and rhythm, no murmur  Respiratory - normal effort, clear to auscultation, good air entry without adventitious noises  Abdomen - slender soft, non-tender, no hepatosplenomegaly, no masses.   - left flank  mild tenderness, no suprapubic tenderness, kidney and bladder non-palpable  MSK - normal spinal curvature. no spinal tenderness. normal gait. muscular strength intact.  Neurology - cranial nerves II-XII grossly intact, normal sensation, no unsteadiness  Skin - intact, no bruising, no gouty tophi  Psych - oriented to time, place, and person, normal mood and affect.    Labs   Urinalysis POC (Office):  Nitrite, UA   Date Value Ref Range Status   03/10/2020 Negative Negative Final   02/10/2020 Positive (!) Negative Final   01/14/2020 Negative Negative Final       Lab Urinalysis:  Blood, UA   Date Value Ref Range Status   03/10/2020 Large (!) Negative Final   02/10/2020 Large (!) Negative Final   01/14/2020 Small (!) Negative Final     Nitrite, UA   Date Value Ref Range Status   03/10/2020 Negative Negative Final   02/10/2020 Positive (!) Negative Final   01/14/2020 Negative Negative Final     Leukocytes, UA   Date Value Ref Range Status   03/10/2020 Negative Negative Final   02/10/2020 Trace (!)  Negative Final   01/14/2020 Negative Negative Final     pH, UA   Date Value Ref Range Status   03/10/2020 6.0 5.0 - 8.0 Final   02/10/2020 5.0 5.0 - 8.0 Final   01/14/2020 5.0 5.0 - 8.0 Final

## 2021-06-06 NOTE — ANESTHESIA CARE TRANSFER NOTE
Last vitals:   Vitals:    03/13/20 1023   BP: 111/68   Pulse: 65   Resp: 12   Temp: 36.4  C (97.5  F)   SpO2: 99%     Patient's level of consciousness is drowsy  Spontaneous respirations: yes  Maintains airway independently: yes  Dentition unchanged: yes  Oropharynx: oral airway in place    QCDR Measures:  ASA# 20 - Surgical Safety Checklist: WHO surgical safety checklist completed prior to induction    PQRS# 430 - Adult PONV Prevention: 4558F - Pt received => 2 anti-emetic agents (different classes) preop & intraop  ASA# 8 - Peds PONV Prevention: NA - Not pediatric patient, not GA or 2 or more risk factors NOT present  PQRS# 424 - [place]-op Temp Management: 4559F - At least one body temp DOCUMENTED => 35.5C or 95.9F within required timeframe  PQRS# 426 - PACU Transfer Protocol: - Transfer of care checklist used  ASA# 14 - Acute Post-op Pain: ASA14B - Patient did NOT experience pain >= 7 out of 10

## 2021-06-06 NOTE — PROGRESS NOTES
Assessment/Plan:        Diagnoses and all orders for this visit:    Calculus of kidney  -     Urinalysis Macroscopic  -     Culture, Urine- Future; Future; Expected date: 03/11/2020  -     Culture, Urine- Future  -     levoFLOXacin tablet 500 mg (LEVAQUIN)  -     lidocaine HCl 2 % topical jelly 10 mL (UROJET)  -     levoFLOXacin tablet 250 mg (LEVAQUIN)  -     Cystoscopy with Stent Removal Education  -     Patient Stated Goal: Prevent further stones  -     CT Abdomen Pelvis Without Oral Without IV Contrast; Future; Expected date: 03/11/2020    Other orders  -     tamsulosin (FLOMAX) 0.4 mg cap; Take 0.4 mg by mouth daily.  -     levoFLOXacin (LEVAQUIN) 250 MG tablet  -     lidocaine HCl (UROJET) 2 % topical jelly      Stone Management Plan  [facility] Stone Management 1/14/2020 2/10/2020   Urinary Tract Infection No suspicion of infection No suspicion of infection   Renal Colic Well controlled symptoms Well controlled symptoms   Renal Failure No suspicion of renal failure No suspicion of renal failure   Current CT date 1/10/2020 -   Right sided stones? Yes -   R Number of ureteral stones No ureteral stones -   R Number of kidney stones  3 -   R GSD of kidney stones 4 - 10 -   R Hydronephrosis None -   R Stone Event New event Established event   Diagnosis date 1/10/2020 -   Initial location of primary symptomatic stone Renal -   Initial GSD of primary symptomatic stone 10 -   R Post-op status - Stent Removal   R Current Plan Clear -   Clear rationale Symptomatic -   Left sided stones? Yes -   L Number of ureteral stones No ureteral stones -   L Number of kidney stones  4 -   L GSD of kidney stones < 2 -   L Hydronephrosis None -   L Stone Event No current event No current event   L Current Plan Observe -   Observe rationale Limited stone burden with good prognosis for spontaneous passage -             Subjective:      HPI  Mr. Freeman Velazquez is a 59 y.o.  male returning to the [facility] [place] for  early postoperative follow up for anticipated stent removal.     He returns status post right ureteroscopic laser lithotripsy for renal stone. He has had no unanticipated post-operative events.    He has had no symptoms suspicious for infection and stent was mildly symptomatic with typical issues of flank discomfort and lower urinary tract irritation.     Flexible cystoscopy is performed and indwelling stent is removed without incident.    He will follow up in the office in one month with imaging.       ROS   Review of systems is negative except for HPI.    Past Medical History:   Diagnosis Date     GERD (gastroesophageal reflux disease)      Kidney stone        Past Surgical History:   Procedure Laterality Date     KNEE SURGERY Bilateral      [place] CYSTOSCOPY,INSERT URETERAL STENT Right 1/21/2020    Procedure: CYSTOSCOPY, WITH FLEXIBLE URETEROSCOPY, RETROGRADES, AND STENT INSERTION RIGHT;  Surgeon: Wil Alejo MD;  Location: [facility];  Service: Urology       Current Outpatient Medications   Medication Sig Dispense Refill     acetaminophen (TYLENOL) 500 MG tablet Take 1,000 mg by mouth every 6 (six) hours as needed for pain.       ibuprofen (ADVIL,MOTRIN) 200 MG tablet Take 400 mg by mouth every 6 (six) hours as needed for pain.       oxyCODONE (ROXICODONE) 5 MG immediate release tablet Take 1-2 tablets (5-10 mg total) by mouth every 4 (four) hours as needed for pain. 12 tablet 0     tamsulosin (FLOMAX) 0.4 mg cap Take 0.4 mg by mouth daily.       Current Facility-Administered Medications   Medication Dose Route Frequency Provider Last Rate Last Dose     levoFLOXacin (LEVAQUIN) 250 MG tablet              lidocaine HCl (UROJET) 2 % topical jelly              levoFLOXacin tablet 500 mg (LEVAQUIN)  500 mg Oral Once Wil Alejo MD           Allergies   Allergen Reactions     Erythromycin Anaphylaxis     Tetracycline Anaphylaxis and Shortness Of Breath     Cranberry        Social History      Socioeconomic History     Marital status:      Spouse name: Not on file     Number of children: Not on file     Years of education: Not on file     Highest education level: Not on file   Occupational History     Occupation: Manager    Social Needs     Financial resource strain: Not on file     Food insecurity:     Worry: Not on file     Inability: Not on file     Transportation needs:     Medical: Not on file     Non-medical: Not on file   Tobacco Use     Smoking status: Never Smoker     Smokeless tobacco: Never Used     Tobacco comment: Maybe three cigars per year   Substance and Sexual Activity     Alcohol use: Not Currently     Comment: occas     Drug use: Never     Sexual activity: Not on file   Lifestyle     Physical activity:     Days per week: Not on file     Minutes per session: Not on file     Stress: Not on file   Relationships     Social connections:     Talks on phone: Not on file     Gets together: Not on file     Attends Jainism service: Not on file     Active member of club or organization: Not on file     Attends meetings of clubs or organizations: Not on file     Relationship status: Not on file     Intimate partner violence:     Fear of current or ex partner: Not on file     Emotionally abused: Not on file     Physically abused: Not on file     Forced sexual activity: Not on file   Other Topics Concern     Not on file   Social History Narrative     Not on file       Family History   Problem Relation Age of Onset     Cancer Mother         breast     Cancer Father         skin     Heart disease Father      Urolithiasis Father      Diabetes Sister      Diabetes Maternal Grandmother      Gout Neg Hx      Clotting disorder Neg Hx      Objective:      Physical Exam  Vitals:    02/10/20 1451   BP: 116/72   Pulse: 60   Temp: 98.2  F (36.8  C)     General - well developed, well nourished, appropriate for age. Appears no distress at this time  Abdomen - slender soft, non-tender, no  hepatosplenomegaly, no masses.   - no flank tenderness, no suprapubic tenderness, kidney and bladder non-palpable  MSK - normal spinal curvature. no spinal tenderness. normal gait. muscular strength intact.  Psych - oriented to time, place, and person, normal mood and affect.      Labs   Urinalysis POC (Office):  Nitrite, UA   Date Value Ref Range Status   02/10/2020 Positive (!) Negative Final   01/14/2020 Negative Negative Final       Lab Urinalysis:  Blood, UA   Date Value Ref Range Status   02/10/2020 Large (!) Negative Final   01/14/2020 Small (!) Negative Final     Nitrite, UA   Date Value Ref Range Status   02/10/2020 Positive (!) Negative Final   01/14/2020 Negative Negative Final     Leukocytes, UA   Date Value Ref Range Status   02/10/2020 Trace (!) Negative Final   01/14/2020 Negative Negative Final     pH, UA   Date Value Ref Range Status   02/10/2020 5.0 5.0 - 8.0 Final   01/14/2020 5.0 5.0 - 8.0 Final

## 2021-06-06 NOTE — ANESTHESIA PREPROCEDURE EVALUATION
Anesthesia Evaluation      Patient summary reviewed   No history of anesthetic complications     Airway   Mallampati: II  Neck ROM: full   Pulmonary - negative ROS and normal exam    breath sounds clear to auscultation  (-) not a smoker                         Cardiovascular - negative ROS  Exercise tolerance: > or = 4 METS  (-) murmur  Rhythm: regular  Rate: normal,    no murmur      Neuro/Psych - negative ROS     Endo/Other - negative ROS      GI/Hepatic/Renal    (+) GERD,   chronic renal disease (nephrolithiasis),           Dental - normal exam                        Anesthesia Plan  Planned anesthetic: general LMA  GA LMA.  Preop meds per Urology orders (PO tylenol 1000 mg, Ketorolac 15 mg, gabapentin 300 mg).  Decadron and zofran for PONV ppx.  ASA 2   Induction: intravenous   Anesthetic plan and risks discussed with: patient  Anesthesia plan special considerations: antiemetics,   Post-op plan: routine recovery

## 2021-06-06 NOTE — PATIENT INSTRUCTIONS - HE
Patient Stated Goal: Know what to expect after surgery  Ureteroscopy    Ureteroscopy is a procedure which is done for clearance of stones from the ureter, kidney or both. There are no incisions involved. The procedure involves your surgeon placing a small scope into your urethra. This is the opening where urine leaves your body.  The surgeon watches as they carefully guide the scope to the stone(s).  Modern flexible ureteroscopes can be used to reach virtually any location within the urinary tract.     The size, shape and location of the stone determines how best to treat the stone(s).  Whenever possible, stones are removed in one piece.  Larger stones need to be broken using a laser before removing in smaller pieces.  The goal is to remove all stones and stone fragments from that side of the body in a single treatment.  Complete stone clearance is an important step to prevent future kidney stone episodes.    Surgery:    Same day outpatient procedure    30-60 minutes    Procedure done in hospital surgical suite    General anesthesia (you will be asleep during the procedure)     Antibiotic prior to surgery to prevent infection    Physician will visit with you and respond to any questions or concerns and consent will be signed prior to going to the operating room    Risks:    Infection - Preoperative antibiotics should prevent new infections but it is possible that unanticipated bacteria may be introduced at time of surgery or that the stones were actually chronically infected before surgery      Injury - The ureter may be injured during this procedure.  This is most likely to happen if the ureter was very inflamed before surgery or if a stone is very tightly impacted.  The surgeon will not aggressively treat a stone if this creates a risk of injury.        Inaccessible Stones -A single procedure is effective in 95% of cases, but if your ureter is very narrow or your kidney stone is very impacted, a stent will be  placed and the procedure stopped.  In 1-2 weeks after the ureter has relaxed, the patient will be brought back to surgery and the procedure can be safely performed.      Incomplete stone clearance -Occasionally stone or stone fragments may not be completely cleared.  These may pass on their own, which may cause discomfort.  Our goal is to remove all possible stones and fragments.    Stent:      An internal soft tube will be placed between the kidney and the bladder while in surgery (after the stone is cleared). The stent will keep the kidney draining.    What should I expect?     It is common for a stent to cause some irritation and discomfort.   You may have:      The need to urinate suddenly     The need to urinate often     Pain during urination     A dull backache, which may get worse during urination     Blood stained urine (like fruit punch) and occasional small clots    It s important to remember the stent is necessary and only temporary. To feel more comfortable:      Drink more than you normally would but you do not have to constantly  flush your kidneys     Limiting your activity may decrease irritation or bleeding    Ibuprofen - 2 tablets every 6-8 hours     Use pain medications as directed.    When is the stent removed?    Most stents are removed within 5 days to 2 weeks after a procedure.     How is the stent removed?     Your stent will be removed in [facility] with a small telescope and a grasping tool.  It usually takes less than 1 minute to remove the stent.    What should I expect after the stent is removed?     You should feel normal by the next day    Some patients find:    An increase in back pain about an hour after the stent is removed as the kidney fills up with urine before it starts to empty.  It can be as uncomfortable as your initial stone episode.  Taking pain medications before stent removal may be helpful, but you would need someone else to drive you to and from your  appointment.    Bladder symptoms usually disappear by the next morning.    Small amounts of blood in the urine may be seen occasionally for up to a week.    Diet:      After surgery, there are no dietary restrictions - Drink to thirst, there is no need to increase intake of fluids, as this may increase nausea symptoms. Try to eat smaller, more frequent snacks, instead of large meals.    Activity:    Many people return to work within 1-2 days. Fatigue is normal for a couple of weeks following surgery. With increased activity you may experience more discomfort and you may notice more blood in your urine.      Post-Operative Symptom Control    While you recover from your procedure, you can take steps to ease your recovery.    Medications that prevent further episodes of severe pain and help stones pass: Take these as prescribed on a regular basis even if you are NOT in pain      Ibuprofen (Advil or Motrin) - Is available over the counter Take 2 (200mg) tablets every 6 hours until the stone passes.  o prevents spasm of the ureter.    o Decreases pain      Dramamine - (drowsy version, non-generic formulation) Is available over the counter and decreases spasm of the ureter.  Take 50mg at bedtime every night until the stone passes. In addition, take every 6 hours as needed.  Dramamine:  o Decreases nausea  o Decreases acute pain  o Decreases recurrence of pain for next 24 hours  o Will help you sleep        *This medication will cause increased drowsiness, do not drive or operate machinery for 6 hours      Flomax- Studies show that Flomax decreases irritation from stents.   o Take every day with food until stone passes even if you do not have pain  o Flomax does not relieve pain.        *This medication may cause nasal congestion or light-headedness      Detrol ( Tolterodine) - After surgery Detrol may decrease stent irritation and pelvic pain  o Take as prescribed     *This medication may cause dry mouth, constipation or  blurry vision. Stop medication if unable to urinate.    Medication that are taken as needed to manage break through symptoms: Take these ONLY as required and hopefully not at all      Narcotics (Percocet, Vicodin, Dilaudid)- take as prescribed for severe pain unrelieved by ibuprofen and dramamine  o Take as prescribed for severe pain  o Narcotics have significant side effects and only  cover-up  pain. They have no effect on cause of pain.  o Common side effects:  - Confusion, disorientation and sedation - DO NOT DRIVE OR OPERATE MACHINERY WITHIN 24 HOURS  - Nausea - take Dramamine or Zofran  or Haldol to help control  - Constipation  - Sleep disturbances      Ondansetron (Zofran)-  o Take as prescribed  o Reserve for severe nausea  o May cause constipation, start over the counter Miralax if needed to treat this    Haldol-  o Take as prescribed  o Reserve for severe nausea    Warning Signs/Symptoms - Please call the [facility] 24 hours a day at [phone] IMMEDIATELY if you experience any of these:    Fever greater than 100.1     Chills    Pain NOT CONTROLLED by pain medications    Heavy bleeding or large clots in urine (small clots can be normal)    Persistent nausea and/or vomiting    Post-Operative Follow up:    The stone(s) will be sent from surgery to a lab for composition analysis.  These results are usually available before a one month post-operative visit.  If you had laser treatment to break up your stone, you will usually be scheduled for a low dose CT scan prior to your one month appointment.  This scan allows your surgeon to confirm that all stone fragments were cleared at time of surgery and that there have been no complications.  These results along with possible labs and urine studies will help us develop an individualized plan to prevent new stones from forming and keep existing stones from enlarging.  This visit is usually scheduled about 1 month after your original surgery.    The  [facility] can respond to your questions or concerns 24 hours a day at [phone].

## 2021-06-06 NOTE — ANESTHESIA POSTPROCEDURE EVALUATION
Patient: Freeman Velazquez  Procedure(s):  CYSTOSCOPY, WITH FLEXIBLE URETEROSCOPIC CALCULUS REMOVAL LASER LITHOTRIPSY  AND STENT INSERTION (Bilateral)  Anesthesia type: general    Patient location: PACU  Last vitals:   Vitals Value Taken Time   /80 3/13/2020 11:30 AM   Temp 36.4  C (97.5  F) 3/13/2020 10:23 AM   Pulse 72 3/13/2020 11:30 AM   Resp 20 3/13/2020 11:30 AM   SpO2 98 % 3/13/2020 11:30 AM     Post vital signs: stable  Level of consciousness: awake and responds to simple questions  Post-anesthesia pain: pain controlled  Post-anesthesia nausea and vomiting: no  Pulmonary: unassisted, return to baseline  Cardiovascular: stable and blood pressure at baseline  Hydration: adequate  Anesthetic events: no    QCDR Measures:  ASA# 11 - Charley-op Cardiac Arrest: ASA11B - Patient did NOT experience unanticipated cardiac arrest  ASA# 12 - Charley-op Mortality Rate: ASA12B - Patient did NOT die  ASA# 13 - PACU Re-Intubation Rate: ASA13B - Patient did NOT require a new airway mgmt  ASA# 10 - Composite Anes Safety: ASA10A - No serious adverse event    Additional Notes:

## 2021-06-06 NOTE — TELEPHONE ENCOUNTER
Patient called stating issues with indwelling stents, having difficulty with nausea and abdominal pain    No fever, voiding but with irritative stent symptoms.    Will add zofran for nausea and restrict opioids if possible.    Continue pain management with dramamine at night, ibuprofen and tylenol during day

## 2021-06-06 NOTE — PATIENT INSTRUCTIONS - HE
Patient Stated Goal: Prevent further stones  Cystoscopy with Stent Removal    Cystoscopy is used to help diagnose urinary problems, or to remove a ureteral stent.    During a cystoscopy, your doctor examines the inside of your bladder with an instrument called a cystoscope. A cystoscope is a long, thin flexible tube with a camera at the end.    Your doctor will insert the scope into your urethra allowing him to visualize and evaluate the inside of the bladder for possible abnormalities. The urethra is the tube that carries urine to the outside of your body.    How is the stent removed?    Your stent will be removed in the [facility] with a small telescope and a grasping tool.  It usually takes less than 1 minute to remove the stent.    What should I expect after the stent is removed?     You should feel normal by the next day.    Some patients find:      An increase in back pain about an hour after the stent is removed as the kidney fills up with urine before it starts to empty.  It can be as uncomfortable as your initial stone episode.  Taking pain medications before stent removal may be helpful, but you would need someone else to drive you to and from your appointment.    Bladder symptoms usually disappear by the next morning.    Small amounts of blood in the urine may be seen occasionally for up to a week.    At Home:      It is important to drink plenty of fluids after your procedure    You may continue to use your pain medications as prescribed    What symptoms should I watch for?    Fever     Chills    Increasing back pain that is not relieved with pain medications    Large amounts of blood in the urine or large clots    Leakage of urine (incontinence)     Are not able to urinate for 8 hours    These symptoms may mean you have a blockage or infection. Call the [facility] 24 hours a day at [phone] immediately.

## 2021-06-07 NOTE — PROGRESS NOTES
Assessment/Plan:        Diagnoses and all orders for this visit:    Calculus of ureter  -     Urinalysis Macroscopic  -     Culture, Urine- Future; Future; Expected date: 04/18/2020  -     Culture, Urine- Future  -     ciprofloxacin HCl tablet 500 mg (CIPRO)  -     lidocaine HCL 2 % topical jelly 10 mL (UROJET)  -     Cystoscopy with Stent Removal Education  -     Patient Stated Goal: Prevent further stones  -     CT Abdomen Pelvis Without Oral Without IV Contrast; Future; Expected date: 04/18/2020  -     CT Abdomen Pelvis Without Oral Without IV Contrast    Other orders  -     ciprofloxacin HCl (CIPRO) 500 MG tablet  -     lidocaine HCL (UROJET) 2 % topical jelly      Stone Management Plan  [facility] 2/10/2020 3/10/2020 3/19/2020   Urinary Tract Infection No suspicion of infection No suspicion of infection No suspicion of infection   Renal Colic Well controlled symptoms Well controlled symptoms Asymptomatic at this time   Renal Failure No suspicion of renal failure No suspicion of renal failure No suspicion of renal failure   Current CT date - 3/10/2020 -   Right sided stones? - Yes -   R Number of ureteral stones - 1 -   R GSD of ureteral stones - 4 -   R Location of ureteral stone - Distal -   R Number of kidney stones  - 2 -   R GSD of kidney stones - < 2 -   R Hydronephrosis - None -   R Stone Event Established event Established event Established event   Diagnosis date - - -   Initial location of primary symptomatic stone - - -   Initial GSD of primary symptomatic stone - - -   R Post-op status Stent Removal 2 - 4 mm Stent Removal   R Current Plan - Clear -   Clear rationale - Other -   Left sided stones? - Yes -   L Number of ureteral stones - 1 -   L GSD of ureteral stones - 3 -   L Location of ureteral stone - Distal -   L Number of kidney stones  - 3 -   L GSD of kidney stones - < 2 -   L Hydronephrosis - Mild -   L Stone Event No current event New event Established event   Diagnosis date -  3/10/2020 -   Initial location of primary symptomatic stone - Distal -   Initial GSD of primary symptomatic stone - 3 -   Post-op status - - Stent Removal   L Current Plan - Clear -   Clear rationale - Symptomatic -   Observe rationale - - -             Subjective:      HPI  Mr. Freeman Velazquez is a 60 y.o.  male returning to the [facility] [place] for early postoperative follow up for anticipated stent removal.     He returns status post bilateral ureteroscopic laser lithotripsy for distal ureteral stone. He has had no unanticipated events.    He has had no symptoms suspicious for infection and stent was moderately symptomatic with typical issues of flank discomfort and lower urinary tract irritation.       Flexible cystoscopy is performed and indwelling stent is removed without incident.    He will follow up in the office in one month with imaging.       ROS   A 12 point comprehensive review of systems is negative except for HPI.    Past Medical History:   Diagnosis Date     GERD (gastroesophageal reflux disease)      Kidney stone        Past Surgical History:   Procedure Laterality Date     KNEE SURGERY Bilateral      [place] CYSTOSCOPY,INSERT URETERAL STENT Right 1/21/2020    Procedure: CYSTOSCOPY, WITH FLEXIBLE URETEROSCOPY, RETROGRADES, AND STENT INSERTION RIGHT;  Surgeon: Wil Alejo MD;  Location: [facility];  Service: Urology       Current Outpatient Medications   Medication Sig Dispense Refill     acetaminophen (TYLENOL) 500 MG tablet Take 1,000 mg by mouth every 6 (six) hours as needed for pain.       ondansetron (ZOFRAN) 4 MG tablet Take 1 tablet (4 mg total) by mouth every 8 (eight) hours as needed for nausea. 15 tablet 0     ibuprofen (ADVIL,MOTRIN) 200 MG tablet Take 400 mg by mouth every 6 (six) hours as needed for pain.       oxyCODONE (ROXICODONE) 5 MG immediate release tablet Take 1-2 tablets (5-10 mg total) by mouth every 4 (four) hours as needed for pain. 12  tablet 0     oxyCODONE (ROXICODONE) 5 MG immediate release tablet Take 1-2 tablets (5-10 mg total) by mouth every 4 (four) hours as needed for pain. 20 tablet 0     tamsulosin (FLOMAX) 0.4 mg cap Take 1 capsule (0.4 mg total) by mouth daily for 14 days. 14 capsule 1     Current Facility-Administered Medications   Medication Dose Route Frequency Provider Last Rate Last Dose     ciprofloxacin HCl (CIPRO) 500 MG tablet              lidocaine HCL (UROJET) 2 % topical jelly                Allergies   Allergen Reactions     Erythromycin Anaphylaxis     Tetracycline Anaphylaxis and Shortness Of Breath     Cranberry        Social History     Socioeconomic History     Marital status:      Spouse name: Not on file     Number of children: Not on file     Years of education: Not on file     Highest education level: Not on file   Occupational History     Occupation: Manager    Social Needs     Financial resource strain: Not on file     Food insecurity     Worry: Not on file     Inability: Not on file     Transportation needs     Medical: Not on file     Non-medical: Not on file   Tobacco Use     Smoking status: Never Smoker     Smokeless tobacco: Never Used     Tobacco comment: Maybe three cigars per year   Substance and Sexual Activity     Alcohol use: Not Currently     Comment: occas     Drug use: Never     Sexual activity: Not on file   Lifestyle     Physical activity     Days per week: Not on file     Minutes per session: Not on file     Stress: Not on file   Relationships     Social connections     Talks on phone: Not on file     Gets together: Not on file     Attends Hindu service: Not on file     Active member of club or organization: Not on file     Attends meetings of clubs or organizations: Not on file     Relationship status: Not on file     Intimate partner violence     Fear of current or ex partner: Not on file     Emotionally abused: Not on file     Physically abused: Not on file     Forced sexual  activity: Not on file   Other Topics Concern     Not on file   Social History Narrative     Not on file       Family History   Problem Relation Age of Onset     Cancer Mother         breast     Cancer Father         skin     Heart disease Father      Urolithiasis Father      Diabetes Sister      Diabetes Maternal Grandmother      Gout Neg Hx      Clotting disorder Neg Hx      Objective:      Physical Exam  Vitals:    03/19/20 1509   BP: 131/73   Pulse: 83   Temp: 98.5  F (36.9  C)     General - well developed, well nourished, appropriate for age. Appears no distress at this time  Abdomen - slender soft, non-tender, no hepatosplenomegaly, no masses.   - no flank tenderness, no suprapubic tenderness, kidney and bladder non-palpable  MSK - normal spinal curvature. no spinal tenderness. normal gait. muscular strength intact.  Psych - oriented to time, place, and person, normal mood and affect.      Labs   Urinalysis POC (Office):  Nitrite, UA   Date Value Ref Range Status   03/19/2020 Negative Negative Final   03/10/2020 Negative Negative Final   02/10/2020 Positive (!) Negative Final       Lab Urinalysis:  Blood, UA   Date Value Ref Range Status   03/19/2020 Large (!) Negative Final   03/10/2020 Large (!) Negative Final   02/10/2020 Large (!) Negative Final     Nitrite, UA   Date Value Ref Range Status   03/19/2020 Negative Negative Final   03/10/2020 Negative Negative Final   02/10/2020 Positive (!) Negative Final     Leukocytes, UA   Date Value Ref Range Status   03/19/2020 Small (!) Negative Final   03/10/2020 Negative Negative Final   02/10/2020 Trace (!) Negative Final     pH, UA   Date Value Ref Range Status   03/19/2020 7.0 5.0 - 8.0 Final   03/10/2020 6.0 5.0 - 8.0 Final   02/10/2020 5.0 5.0 - 8.0 Final

## 2021-06-07 NOTE — PATIENT INSTRUCTIONS - HE
Patient Stated Goal: Prevent further stones  Cystoscopy with Stent Removal    Cystoscopy is used to help diagnose urinary problems, or to remove a ureteral stent.    During a cystoscopy, your doctor examines the inside of your bladder with an instrument called a cystoscope. A cystoscope is a long, thin flexible tube with a camera at the end.    Your doctor will insert the scope into your urethra allowing him to visualize and evaluate the inside of the bladder for possible abnormalities. The urethra is the tube that carries urine to the outside of your body.    How is the stent removed?    Your stent will be removed in the [facility] with a small telescope and a grasping tool.  It usually takes less than 1 minute to remove the stent.    What should I expect after the stent is removed?     You should feel normal by the next day.    Some patients find:      An increase in back pain about an hour after the stent is removed as the kidney fills up with urine before it starts to empty.  It can be as uncomfortable as your initial stone episode.  Taking pain medications before stent removal may be helpful, but you would need someone else to drive you to and from your appointment.    Bladder symptoms usually disappear by the next morning.    Small amounts of blood in the urine may be seen occasionally for up to a week.    At Home:      It is important to drink plenty of fluids after your procedure    You may continue to use your pain medications as prescribed    What symptoms should I watch for?    Fever     Chills    Increasing back pain that is not relieved with pain medications    Large amounts of blood in the urine or large clots    Leakage of urine (incontinence)     Are not able to urinate for 8 hours    These symptoms may mean you have a blockage or infection. Call the [facility] 24 hours a day at [phone] immediately.

## 2021-06-07 NOTE — PROGRESS NOTES
Patient educated regarding stent removal procedure and possible symptoms after removal.  Patient voiced understanding of information.  Handout given to patient.  Consent form signed.  Dominique Adorno RN  [facility] Timeout    Correct patient?: Yes  Correct site?:  Yes  Correct procedure?:  Yes  Correct laterality?:  Bilateral  Consents verified?:  Yes  Relevant lab results available?:  Yes        Dominique Adorno RN

## 2021-06-09 NOTE — PATIENT INSTRUCTIONS - HE
Patient Stated Goal: Prevent further stones  Steps for collecting a 24 hour urine specimen    Please follow the directions carefully. All urine voided for a 24-hour period needs to be collected into the jug.  DO NOT change any of your  normal  daily habits when doing this test. Continue to follow your regular diet, intake of fluids, and usual activity level. Pick the most convenient day with your schedule, perhaps on a weekend or a day off.    Start your Diet Log the day before collection and continue on the day of urine collection.  You MUST bring Diet Log with you on follow up visit to discuss results.    One 24hr Urine Collection     Two 24hr Urine Collections  (do not collect on consecutive days)    PLEASE COMPLETE THE 2nd JUG WITHIN 1-2 WEEKS FROM THE 1st JUG    STEP 1  Empty your bladder completely into the toilet. This will be your start time. Write your full legal name, start date and time on the jug label.  Collection start and stop times need to match exactly!  For example:  6 am to 6 am.    STEP 2  The next time you urinate, empty your bladder directly into the jug or collection hat and pour urine into the jug.  Screw the lid back onto the jug.  Do not spill!    STEP 3  Place the jug in the refrigerator or a cooler with ice during the collection period.  Failure to keep it cool could cause inaccurate test results. DO NOT Freeze.    STEP 4  Continue collecting all urine into the jug for the rest of the day, for the full 24 hours.  DO NOT stop early or go over 24 hours!    STEP 5  Exactly 24 hours from start of collection, write your full legal name, stop date and time on the jug label.   Collection start and stop times need to match exactly!  For example:  6 am to 6 am.  Failure to label correctly will result in recollection of urine specimen.    STEP 6  Return each jug within 24 hours after final urination.     STEP 7  Drop off jug locations:   [facility]: Mon-Fri 7am-7pm - Closed on  weekends  [facility]: Mon-Fri 7am-5pm - Closed on Sunday  [facility]: Mon-Fri 7am-6:30pm - Closed on weekends    STEP 8  Please call [facility] after return of your final jug to schedule your follow-up visit. [phone]

## 2021-06-09 NOTE — PROGRESS NOTES
Assessment/Plan:        Diagnoses and all orders for this visit:    Calculus of kidney  -     Renal Function Profile; Future; Expected date: 06/18/2020  -     Magnesium; Future; Expected date: 06/18/2020  -     Uric Acid; Future; Expected date: 06/18/2020  -     Calcium, Ionized, Measured; Future; Expected date: 06/18/2020  -     Parathyroid Hormone Intact with Minerals; Future; Expected date: 06/18/2020  -     Magnesium, 24 Hour Urine; Future; Expected date: 06/18/2020  -     Stone Formation, 24 Hour Urine (does not include Magnesium); Standing  -     Patient Stated Goal: Prevent further stones  -     24 Hour Urine Collection Steps Education    Other orders  -     naproxen sodium (ALEVE) 220 MG tablet; Take 220 mg by mouth 2 (two) times a day with meals.       Stone Management Plan  [facility] Stone Management 3/10/2020 3/19/2020 6/18/2020   Urinary Tract Infection No suspicion of infection No suspicion of infection No suspicion of infection   Renal Colic Well controlled symptoms Asymptomatic at this time Asymptomatic at this time   Renal Failure No suspicion of renal failure No suspicion of renal failure No suspicion of renal failure   Current CT date 3/10/2020 - 6/17/2020   Right sided stones? Yes - Yes   R Number of ureteral stones 1 - No ureteral stones   R GSD of ureteral stones 4 - -   R Location of ureteral stone Distal - -   R Number of kidney stones  2 - 1   R GSD of kidney stones < 2 - 2 - 4   R Hydronephrosis None - None   R Stone Event Established event Established event Resolved event   Diagnosis date - - -   Initial location of primary symptomatic stone - - -   Initial GSD of primary symptomatic stone - - -   Resolved date - - 6/18/2020   R Post-op status 2 - 4 mm Stent Removal -   R Current Plan Clear - -   Clear rationale Other - -   Left sided stones? Yes - No   L Number of ureteral stones 1 - -   L GSD of ureteral stones 3 - -   L Location of ureteral stone Distal - -   L Number of kidney stones  3 - -  "  L GSD of kidney stones < 2 - -   L Hydronephrosis Mild - -   L Stone Event New event Established event Resolved event   Diagnosis date 3/10/2020 - -   Initial location of primary symptomatic stone Distal - -   Initial GSD of primary symptomatic stone 3 - -   Resolved date - - 6/18/2020   Post-op status - Stent Removal -   L Current Plan Clear - -   Clear rationale Symptomatic - -   Observe rationale - - -             Phone call duration: 16 minutes    Wil Alejo MD     Subjective:      The patient has been notified of following:     \"This telephone visit will be conducted via a call between you and your physician/provider. We have found that certain health care needs can be provided without the need for a physical exam.  This service lets us provide the care you need with a short phone conversation.  If a prescription is necessary we can send it directly to your pharmacy.  If labs and/or imaging are needed, we can place orders so you can have the test (s) done at a later time.    If during the course of the call the physician/provider feels a telephone visit is not appropriate, you will not be charged for this service.\"     HPI  Mr. Freeman Velazquez is a 60 y.o.  male who is being evaluated via a billable telephone visit by [facility] for late postoperative follow-up.     He returns status post Bilateral ureteroscopic laser lithotripsy for renal and ureteral stone. He has had no unanticipated post-operative events.     He is asymptomatic at present. He denies symptoms of fever, chills, flank pain, nausea, vomiting, urinary frequency and dysuria.    New CT scan was personally reviewed and demonstrates largest residual fragment is <2 mm mm in the right renal lower pole with no hydronephrosis.     Stone composition was 100% calcium oxalate.     He is at risk for ongoing active stone disease and will initiate stone risk evaluation. Serum stone risk chemistries including " parathyroid hormone and ionized calcium will be obtained before next visit. [facility] 24 hour [facility]       ROS   Review of systems is negative except for HPI.    Past Medical History:   Diagnosis Date     GERD (gastroesophageal reflux disease)      Kidney stone        Past Surgical History:   Procedure Laterality Date     KNEE SURGERY Bilateral      CT CYSTOSCOPY,INSERT URETERAL STENT Right 1/21/2020    Procedure: CYSTOSCOPY, WITH FLEXIBLE URETEROSCOPY, RETROGRADES, AND STENT INSERTION RIGHT;  Surgeon: Wil Alejo MD;  Location: [facility];  Service: Urology       Current Outpatient Medications   Medication Sig Dispense Refill     ibuprofen (ADVIL,MOTRIN) 200 MG tablet Take 400 mg by mouth every 6 (six) hours as needed for pain.        naproxen sodium (ALEVE) 220 MG tablet Take 220 mg by mouth 2 (two) times a day with meals.        No current facility-administered medications for this visit.        Allergies   Allergen Reactions     Erythromycin Anaphylaxis     Tetracycline Anaphylaxis and Shortness Of Breath     Cranberry        Social History     Socioeconomic History     Marital status:      Spouse name: Not on file     Number of children: Not on file     Years of education: Not on file     Highest education level: Not on file   Occupational History     Occupation: Manager    Social Needs     Financial resource strain: Not on file     Food insecurity     Worry: Not on file     Inability: Not on file     Transportation needs     Medical: Not on file     Non-medical: Not on file   Tobacco Use     Smoking status: Never Smoker     Smokeless tobacco: Never Used     Tobacco comment: Maybe three cigars per year   Substance and Sexual Activity     Alcohol use: Not Currently     Comment: occas     Drug use: Never     Sexual activity: Not on file   Lifestyle     Physical activity     Days per week: Not on file     Minutes per session:  Not on file     Stress: Not on file   Relationships     Social connections     Talks on phone: Not on file     Gets together: Not on file     Attends Yarsanism service: Not on file     Active member of club or organization: Not on file     Attends meetings of clubs or organizations: Not on file     Relationship status: Not on file     Intimate partner violence     Fear of current or ex partner: Not on file     Emotionally abused: Not on file     Physically abused: Not on file     Forced sexual activity: Not on file   Other Topics Concern     Not on file   Social History Narrative     Not on file       Family History   Problem Relation Age of Onset     Cancer Mother         breast     Cancer Father         skin     Heart disease Father      Urolithiasis Father      Diabetes Sister      Diabetes Maternal Grandmother      Gout Neg Hx      Clotting disorder Neg Hx        Objective:      Labs   Urinalysis POC (Office):  Nitrite, UA   Date Value Ref Range Status   03/19/2020 Negative Negative Final   03/10/2020 Negative Negative Final   02/10/2020 Positive (!) Negative Final       Lab Urinalysis:  Blood, UA   Date Value Ref Range Status   03/19/2020 Large (!) Negative Final   03/10/2020 Large (!) Negative Final   02/10/2020 Large (!) Negative Final     Nitrite, UA   Date Value Ref Range Status   03/19/2020 Negative Negative Final   03/10/2020 Negative Negative Final   02/10/2020 Positive (!) Negative Final     Leukocytes, UA   Date Value Ref Range Status   03/19/2020 Small (!) Negative Final   03/10/2020 Negative Negative Final   02/10/2020 Trace (!) Negative Final     pH, UA   Date Value Ref Range Status   03/19/2020 7.0 5.0 - 8.0 Final   03/10/2020 6.0 5.0 - 8.0 Final   02/10/2020 5.0 5.0 - 8.0 Final

## 2021-09-19 ENCOUNTER — HEALTH MAINTENANCE LETTER (OUTPATIENT)
Age: 61
End: 2021-09-19

## 2022-01-09 ENCOUNTER — HEALTH MAINTENANCE LETTER (OUTPATIENT)
Age: 62
End: 2022-01-09

## 2022-04-19 ENCOUNTER — OFFICE VISIT (OUTPATIENT)
Dept: UROLOGY | Facility: CLINIC | Age: 62
End: 2022-04-19
Payer: COMMERCIAL

## 2022-04-19 VITALS
HEART RATE: 74 BPM | DIASTOLIC BLOOD PRESSURE: 74 MMHG | RESPIRATION RATE: 14 BRPM | OXYGEN SATURATION: 97 % | SYSTOLIC BLOOD PRESSURE: 122 MMHG

## 2022-04-19 DIAGNOSIS — N52.9 ERECTILE DYSFUNCTION, UNSPECIFIED ERECTILE DYSFUNCTION TYPE: ICD-10-CM

## 2022-04-19 DIAGNOSIS — N48.6 PEYRONIE'S DISEASE: ICD-10-CM

## 2022-04-19 DIAGNOSIS — N40.1 BENIGN PROSTATIC HYPERPLASIA WITH LOWER URINARY TRACT SYMPTOMS, SYMPTOM DETAILS UNSPECIFIED: Primary | ICD-10-CM

## 2022-04-19 PROCEDURE — 51798 US URINE CAPACITY MEASURE: CPT | Performed by: UROLOGY

## 2022-04-19 PROCEDURE — 99203 OFFICE O/P NEW LOW 30 MIN: CPT | Mod: 25 | Performed by: UROLOGY

## 2022-04-19 RX ORDER — SILDENAFIL 100 MG/1
100 TABLET, FILM COATED ORAL DAILY PRN
Qty: 30 TABLET | Refills: 4 | Status: SHIPPED | OUTPATIENT
Start: 2022-04-19 | End: 2023-12-08

## 2022-04-19 NOTE — PROGRESS NOTES
S: Patient is a pleasant 62-year-old male who was seen for a consultation with regard to several urological issues.  First of all patient has noticed mild urine symptoms lately.  His AUA symptom score is only 10 with a quality-of-life score of 2.  He has issue with penile curvature for about a year and a half along with some difficulty with getting erections.  Erections are softer (6/10).  He has limited sexual activities because of wife's condition.  He history of kidney stones with surgical removal of the stone previously.  Current Outpatient Medications   Medication Sig Dispense Refill     bisacodyl (DULCOLAX) 5 MG EC tablet Take 1 tablet (5 mg) by mouth See Admin Instructions -Day prior to procedure take 1 tablet bisacodyl at 12:00. 1 tablet 0     Na Sulfate-K Sulfate-Mg Sulf (SUPREP BOWEL PREP KIT) solution Take 354 mLs (2 Bottles) by mouth See Admin Instructions Take as directed. 354 mL 0     Na Sulfate-K Sulfate-Mg Sulf (SUPREP BOWEL PREP KIT) solution Take 354 mLs (2 Bottles) by mouth See Admin Instructions -Evening before procedure complete steps 1 through 4 (see package) using one 6 ounce bottle before bed.  Morning of procedure, repeat steps 1 through 4 using the other 6 ounce bottle. 354 mL 0     Naproxen Sodium (ALEVE PO) Take by mouth as needed for moderate pain       ondansetron (ZOFRAN) 4 MG tablet Take 1 tablet (4 mg) by mouth every 6 hours as needed for nausea 10 tablet 0     polyethylene glycol (GOLYTELY) 236 g suspension Take 4,000 mLs by mouth See Admin Instructions -Mix GoLytely as directed on container.  At 6:00 PM, drink an 8 Oz glass of solution and continue drinking 8 Oz glass every 15 minutes until bottle is empty. 4000 mL 0     polyethylene glycol (MIRALAX) 17 GM/Dose powder Take 17 g (1 capful) by mouth daily 225 g 0     sildenafil (VIAGRA) 100 MG tablet Take 1 tablet (100 mg) by mouth daily as needed (sex) 30 tablet 4     Simethicone 125 MG TABS Take 125 mg by mouth See Admin  Instructions --Take tablet after finishing second half of Suprep with half a glass of water. 1 tablet 0     Simethicone 125 MG TABS Take 125 mg by mouth See Admin Instructions -Take tablet after finishing second half of Suprep with half a glass of water. 1 tablet 0     Simethicone 125 MG TABS Take 125 mg by mouth See Admin Instructions 1 tablet 0     Allergies   Allergen Reactions     Tetracycline Shortness Of Breath     Erythromycin      Past Medical History:   Diagnosis Date     Arthritis      Distal radius fracture 7/20/2011     GERD (gastroesophageal reflux disease)      Kidney stone      Renal colic      Past Surgical History:   Procedure Laterality Date     ARTHROSCOPY KNEE BILATERAL       COLONOSCOPY  4/16/2013    Procedure: COLONOSCOPY;;  Surgeon: Lewis Metcalf MD;  Location: [facility]     COLONOSCOPY N/A 1/24/2019    Procedure: Combined Colonoscopy, Single Or Multiple Biopsy/Polypectomy By Biopsy;  Surgeon: Javier Judge MD;  Location: [facility]     COLONOSCOPY N/A 4/6/2021    Procedure: Colonoscopy, With Polypectomy And Biopsy;  Surgeon: Sasha Martinez DO;  Location: [facility]     COLONOSCOPY WITH CO2 INSUFFLATION N/A 1/24/2019    Procedure: COLONOSCOPY WITH CO2 INSUFFLATION;  Surgeon: Javier Judge MD;  Location: [facility]     COLONOSCOPY WITH CO2 INSUFFLATION N/A 4/5/2021    Procedure: COLONOSCOPY, WITH CO2 INSUFFLATION;  Surgeon: Cedrick Sheikh DO;  Location: [facility]     COLONOSCOPY WITH CO2 INSUFFLATION N/A 4/6/2021    Procedure: COLONOSCOPY, WITH CO2 INSUFFLATION;  Surgeon: Sasha Martinez DO;  Location: [facility]     HC CYSTOSCOPY,INSERT URETERAL STENT Right 1/21/2020    Procedure: CYSTOSCOPY, WITH FLEXIBLE URETEROSCOPY, RETROGRADES, AND STENT INSERTION RIGHT;  Surgeon: Wil Alejo MD;  Location: [facility] OR;  Service: Urology     KNEE SURGERY Bilateral       Family History   Problem Relation Age of Onset     Breast Cancer Mother      Allergies Mother      Anesthesia  Reaction Mother      Arthritis Mother      Blood Disease Mother      Cardiovascular Father      Eye Disorder Father      Heart Disease Father      Cancer Father         skin     Macular Degeneration Father      Glaucoma Father      Diabetes Sister      Diabetes Maternal Grandmother      Thyroid Disease No family hx of      Cerebrovascular Disease No family hx of      Hypertension No family hx of      Cancer Mother         breast     Urolithiasis Father      Diabetes Sister      Gout No family hx of      Clotting Disorder No family hx of      Social History     Socioeconomic History     Marital status:      Spouse name: None     Number of children: None     Years of education: None     Highest education level: None   Occupational History     Employer: [employer]   Tobacco Use     Smoking status: Never Smoker     Smokeless tobacco: Never Used   Substance and Sexual Activity     Alcohol use: Yes     Comment: rare, one drink in 6 months     Drug use: No     Sexual activity: Not Currently     Partners: Female     Comment: many years   Other Topics Concern     Parent/sibling w/ CABG, MI or angioplasty before 65F 55M? No       REVIEW OF SYSTEMS  =================  C: NEGATIVE for fever, chills, change in weight  I: NEGATIVE for worrisome rashes, moles or lesions  E/M: NEGATIVE for ear, mouth and throat problems  R: NEGATIVE for significant cough or SHORTNESS OF BREATH  CV:  NEGATIVE for chest pain, palpitations or peripheral edema  GI: NEGATIVE for nausea, abdominal pain, heartburn, or change in bowel habits  NEURO: NEGATIVE numbness/weakness  : see HPI  PSYCH: NEGATIVE depression/anxiety  LYmph: no new enlarged lymph nodes  Ortho: no new trauma/movements      Physical Exam:  /74 (BP Location: Right arm, Patient Position: Chair, Cuff Size: Adult Large)   Pulse 74   Resp 14   SpO2 97%    Patient is pleasant, in no acute distress, good general condition.  Heart:  negative, PMI  normal  Lung: no evidence of respiratory distress    Abdomen: Soft, nondistended, non tender. No masses. No rebound or guarding.   Exam: Dorsal penile plaque.  Testes no masses.  No scrotal skin lesion.  Skin: Warm and dry.  No redness.  Neuro: grossly normal  Musculaskeletal: moving all extremities  Psych normal mood and affect  Musculoskeletal  moving all extremities  Hematologic/Lymphatic/Immunologic: normal ant/post cervical, axillary, supraclavicular and inguinal nodes    Assessment/Plan:     #1 Peyronie's disease: Treatment options discussed.  We discussed observation, Xiaflex, and surgical correction.    #2 ED: Discussed medical management.  We will start patient on Viagra as needed.    #3 BPH: Mild symptoms.

## 2022-11-21 ENCOUNTER — HEALTH MAINTENANCE LETTER (OUTPATIENT)
Age: 62
End: 2022-11-21

## 2023-04-16 ENCOUNTER — HEALTH MAINTENANCE LETTER (OUTPATIENT)
Age: 63
End: 2023-04-16

## 2023-11-16 ENCOUNTER — TELEPHONE (OUTPATIENT)
Dept: OPHTHALMOLOGY | Facility: CLINIC | Age: 63
End: 2023-11-16
Payer: MEDICARE

## 2023-11-16 NOTE — TELEPHONE ENCOUNTER
Freeman Velazquez [phone]     Spoke to pt at 1700    Pt states scheduled appt with another eye clinic.    Right eye flashing with eye movement times two weeks ago-- still noticed    Right eye floaters-- more smaller floaters and some larger floaters. Floaters noticed about 3 weeks ago    Some intermittent blurring of vision    Scheduled tomorrow at 1230 with Dr. Gimenez at [facility] location      Pt aware of date/time/location/duration/hospital based clinic/parking/non-humana insurance    Timbo David RN 5:09 PM 11/16/23

## 2023-11-16 NOTE — TELEPHONE ENCOUNTER
[facility] Call Center    Phone Message    May a detailed message be left on voicemail: yes     Reason for Call: Appointment Intake    Referring Provider Name: None  Diagnosis and/or Symptoms: Floaters/ Flashers . Per protocol TE must be sent. Please review and contact to discuss. Thank you    Pt can be best reached at [phone]    Action Taken: Message routed to:  [facility] E[facility]    Travel Screening: Not Applicable

## 2023-11-17 ENCOUNTER — OFFICE VISIT (OUTPATIENT)
Dept: OPHTHALMOLOGY | Facility: CLINIC | Age: 63
End: 2023-11-17
Attending: OPTOMETRIST
Payer: MEDICARE

## 2023-11-17 DIAGNOSIS — H25.13 AGE-RELATED NUCLEAR CATARACT OF BOTH EYES: ICD-10-CM

## 2023-11-17 DIAGNOSIS — H43.811 PVD (POSTERIOR VITREOUS DETACHMENT), RIGHT: Primary | ICD-10-CM

## 2023-11-17 PROCEDURE — G0463 HOSPITAL OUTPT CLINIC VISIT: HCPCS | Performed by: OPTOMETRIST

## 2023-11-17 PROCEDURE — 92004 COMPRE OPH EXAM NEW PT 1/>: CPT | Performed by: OPTOMETRIST

## 2023-11-17 ASSESSMENT — VISUAL ACUITY
OS_CC: 20/20
METHOD: SNELLEN - LINEAR
CORRECTION_TYPE: GLASSES
OD_CC: 20/20

## 2023-11-17 ASSESSMENT — CONF VISUAL FIELD
OS_NORMAL: 1
OD_SUPERIOR_TEMPORAL_RESTRICTION: 0
OS_SUPERIOR_TEMPORAL_RESTRICTION: 0
OD_INFERIOR_NASAL_RESTRICTION: 0
OD_SUPERIOR_NASAL_RESTRICTION: 0
OD_NORMAL: 1
OS_SUPERIOR_NASAL_RESTRICTION: 0
OS_INFERIOR_TEMPORAL_RESTRICTION: 0
OS_INFERIOR_NASAL_RESTRICTION: 0
METHOD: COUNTING FINGERS
OD_INFERIOR_TEMPORAL_RESTRICTION: 0

## 2023-11-17 ASSESSMENT — REFRACTION_WEARINGRX
OD_CYLINDER: SPHERE
OS_AXIS: 090
OS_SPHERE: -2.00
OD_SPHERE: -2.25
OS_ADD: +2.50
OS_CYLINDER: +0.25
OD_ADD: +2.50

## 2023-11-17 ASSESSMENT — EXTERNAL EXAM - LEFT EYE: OS_EXAM: NORMAL

## 2023-11-17 ASSESSMENT — SLIT LAMP EXAM - LIDS
COMMENTS: NORMAL
COMMENTS: NORMAL

## 2023-11-17 ASSESSMENT — TONOMETRY
OD_IOP_MMHG: 14
IOP_METHOD: TONOPEN
OS_IOP_MMHG: 9

## 2023-11-17 ASSESSMENT — CUP TO DISC RATIO
OD_RATIO: 0.2
OS_RATIO: 0.2

## 2023-11-17 ASSESSMENT — EXTERNAL EXAM - RIGHT EYE: OD_EXAM: NORMAL

## 2023-11-17 NOTE — NURSING NOTE
Chief Complaints and History of Present Illnesses   Patient presents with    Floaters Right Eye     New patient floaters right eye.      Chief Complaint(s) and History of Present Illness(es)       Floaters Right Eye              Laterality: right eye    Quality: small and large    Duration: 2 weeks    Course: gradually worsening    Associated symptoms: flashes and blurred vision.  Negative for shade, peripheral vision loss, headache, nausea, vomiting and photophobia    Pain scale: 0/10    Comments: New patient floaters right eye.              Comments    Right eye feels irritation, like sandpaper or burning.  Flashes of light started 2 weeks ago, right side periphery of right eye.  Patient says floaters right eye look like oil on water.    GUSTAVO Linares 11/17/2023 12:10 PM

## 2023-11-17 NOTE — PATIENT INSTRUCTIONS
PVD right eye retina flat 360   Cataracts minimal   Discussed vitreoul floaters and PVD   Educated patient on signs and symptoms of retinal detachment including increase in flashes, floaters, or a change in vision. If symptoms present, return to clinic immediately.     Patient disposition:   Return in about 1 year (around 11/17/2024).

## 2023-11-17 NOTE — PROGRESS NOTES
Assessment & Plan       Freeman Velazquez is a 63 year old male with the following diagnoses:   1. PVD (posterior vitreous detachment), right    2. Age-related nuclear cataract of both eyes          PVD right eye retina flat 360   Cataracts minimal   Discussed vitreoul floaters and PVD   Educated patient on signs and symptoms of retinal detachment including increase in flashes, floaters, or a change in vision. If symptoms present, return to clinic immediately.     Patient disposition:   Return in about 1 year (around 11/17/2024).          Complete documentation of historical and exam elements from today's encounter can be found in the full encounter summary report (not reduplicated in this progress note). I personally obtained the chief complaint(s) and history of present illness.  I confirmed and edited as necessary the review of systems, past medical/surgical history, family history, social history, and examination findings as documented by others; and I examined the patient myself. I personally reviewed the relevant tests, images, and reports as documented above. I formulated and edited as necessary the assessment and plan and discussed the findings and management plan with the patient and family.  Dr. Jose Gimenez

## 2023-11-30 PROBLEM — N20.0 CALCULUS OF KIDNEY: Status: ACTIVE | Noted: 2020-01-14

## 2023-11-30 PROBLEM — N20.1 CALCULUS OF URETER: Status: ACTIVE | Noted: 2020-03-10

## 2023-11-30 PROBLEM — N23 RENAL COLIC: Status: ACTIVE | Noted: 2020-03-10

## 2023-12-01 ENCOUNTER — OFFICE VISIT (OUTPATIENT)
Dept: PODIATRY | Facility: CLINIC | Age: 63
End: 2023-12-01
Payer: MEDICARE

## 2023-12-01 VITALS
DIASTOLIC BLOOD PRESSURE: 78 MMHG | BODY MASS INDEX: 24.51 KG/M2 | HEART RATE: 70 BPM | HEIGHT: 74 IN | SYSTOLIC BLOOD PRESSURE: 144 MMHG | WEIGHT: 191 LBS

## 2023-12-01 DIAGNOSIS — Q82.8 POROKERATOSIS: ICD-10-CM

## 2023-12-01 DIAGNOSIS — M21.619 BUNION: Primary | ICD-10-CM

## 2023-12-01 DIAGNOSIS — R20.0 NUMBNESS OF FOOT: ICD-10-CM

## 2023-12-01 PROCEDURE — 99204 OFFICE O/P NEW MOD 45 MIN: CPT | Performed by: PODIATRIST

## 2023-12-01 NOTE — LETTER
12/1/2023         RE: Freeman Velazquez  [address]  [facility] [id number]        Dear Colleague,    Thank you for referring your patient, Freeman Velazquez, to the [facility]. Please see a copy of my visit note below.     Subjective:    Pt is seen today as a new pt referral with the c/c of painful right foot.  Points to plantar second and fifth metatarsal heads.  Denies stepping on foreign bodies.  Denies erythema edema drainage or masses.  Pain aggravated by activity and relieved by rest.  This started recently.  Patient also has right bunion.  This has been symptomatic for the past 1 years.  Points to medial head of first metatarsal.  Has pain w/ ambulation and shoewear and is relieved by rest and going barefoot.  Denies pain in the contralateral foot.  Some numbness on right medial hallux and also some on plantar second and third metatarsal heads.  Denies weakness.     ROS:   See above     Allergies   Allergen Reactions     Tetracycline Shortness Of Breath     Erythromycin        Current Outpatient Medications   Medication Sig Dispense Refill     bisacodyl (DULCOLAX) 5 MG EC tablet Take 1 tablet (5 mg) by mouth See Admin Instructions -Day prior to procedure take 1 tablet bisacodyl at 12:00. 1 tablet 0     Naproxen Sodium (ALEVE PO) Take by mouth as needed for moderate pain       ondansetron (ZOFRAN) 4 MG tablet Take 1 tablet (4 mg) by mouth every 6 hours as needed for nausea (Patient not taking: Reported on 12/1/2023) 10 tablet 0     polyethylene glycol (MIRALAX) 17 GM/Dose powder Take 17 g (1 capful) by mouth daily 225 g 0     sildenafil (VIAGRA) 100 MG tablet Take 1 tablet (100 mg) by mouth daily as needed (sex) 30 tablet 4       Patient Active Problem List   Diagnosis     Family history of malignant neoplasm of prostate     ACP (advance care planning)     Family history of malignant melanoma of skin     Gastroesophageal reflux disease with esophagitis      Osteoarthritis of both knees, unspecified osteoarthritis type     Renal colic     Calculus of ureter     Calculus of kidney       Past Medical History:   Diagnosis Date     Arthritis      Distal radius fracture 7/20/2011     GERD (gastroesophageal reflux disease)      Kidney stone      Renal colic        Past Surgical History:   Procedure Laterality Date     ARTHROSCOPY KNEE BILATERAL       COLONOSCOPY  4/16/2013    Procedure: COLONOSCOPY;;  Surgeon: Lewis Metcalf MD;  Location: [facility]     COLONOSCOPY N/A 1/24/2019    Procedure: Combined Colonoscopy, Single Or Multiple Biopsy/Polypectomy By Biopsy;  Surgeon: Javier Judge MD;  Location: [facility]     COLONOSCOPY N/A 4/6/2021    Procedure: Colonoscopy, With Polypectomy And Biopsy;  Surgeon: Sasha Martinez DO;  Location: [facility]     COLONOSCOPY WITH CO2 INSUFFLATION N/A 1/24/2019    Procedure: COLONOSCOPY WITH CO2 INSUFFLATION;  Surgeon: Javier Judge MD;  Location: [facility]     COLONOSCOPY WITH CO2 INSUFFLATION N/A 4/5/2021    Procedure: COLONOSCOPY, WITH CO2 INSUFFLATION;  Surgeon: Cedrick Sheikh DO;  Location: [facility]     COLONOSCOPY WITH CO2 INSUFFLATION N/A 4/6/2021    Procedure: COLONOSCOPY, WITH CO2 INSUFFLATION;  Surgeon: Sasha Martinez DO;  Location: [facility] OR     HC CYSTOSCOPY,INSERT URETERAL STENT Right 1/21/2020    Procedure: CYSTOSCOPY, WITH FLEXIBLE URETEROSCOPY, RETROGRADES, AND STENT INSERTION RIGHT;  Surgeon: Wil Alejo MD;  Location: [facility];  Service: Urology     KNEE SURGERY Bilateral        Family History   Problem Relation Age of Onset     Breast Cancer Mother      Allergies Mother      Anesthesia Reaction Mother      Arthritis Mother      Blood Disease Mother      Cardiovascular Father      Eye Disorder Father      Heart Disease Father      Cancer Father         skin     Macular Degeneration Father      Glaucoma Father      Diabetes Sister      Diabetes Maternal Grandmother      Thyroid Disease No family hx  "of      Cerebrovascular Disease No family hx of      Hypertension No family hx of      Cancer Mother         breast     Urolithiasis Father      Diabetes Sister      Gout No family hx of      Clotting Disorder No family hx of        Social History     Tobacco Use     Smoking status: Never     Smokeless tobacco: Never   Substance Use Topics     Alcohol use: Yes     Comment: rare, one drink in 6 months       Objective:    O:  BP (!) 144/78   Pulse 70   Ht 1.88 m (6' 2\")   Wt 86.6 kg (191 lb)   BMI 24.52 kg/m  .      Constitutional/ general:  Pt is in no apparent distress, appears well-nourished.  Cooperative with history and physical exam.     Psych:  The patient answered questions appropriately.  Normal affect.  Seems to have reasonable expectations, in terms of treatment.     Lungs:  Non labored breathing, non labored speech. No cough.  No audible wheezing. Even, quiet breathing.       Vascular:  Pedal pulses are palpable bilaterally for both the DP and PT arteries.  CFT < 3 sec.  No edema.  Pedal hair growth noted.     Neuro:  Alert and oriented x 3. Coordinated gait.  Light touch sensation is somewhat diminished plantar second and third toes and met heads and around right bunion.  Intact everywhere else.    Derm: Normal texture and turgor.  No erythema, ecchymosis, or cyanosis.  No open lesions.  Diffuse shearing hyperkeratotic lesion with clear central core noted under right second and fifth metatarsal head.  No underlying masses.    Musculoskeletal:    Lower extremity muscle strength is normal.  Patient is ambulatory without an assistive device or brace.    Normal arch with weightbearing.   No equinus.    right bunion deformity noted.  No pain with range of motion.  Positive tracking with ROM.  No medial bursa or masses noted.  No pain on the sesamoids or dorsally.  Fat pad atrophic          Assessment:    Right foot porokeratosis x2   hallux abducto valgus deformity right  Bilateral numbness, " mechanical    Plan: Discussed cause of porokeratosis.  He will keep these down with a pumice stone.  Can use AmLactin or Compound W on these.  Discussed stiff shoe to offload these.  Also discussed how his numbness in feet is mechanical.  Discussed good stiff supportive shoes to help with this.  If still painful sometimes removal of porokeratosis will single lobed rotational flap is helpful.  We will pursue conservative treatments first.  Discussed cause of bunion.  Shoe inserts or orthotics will often times help with the bunion pain, however, inserts make a shoe fit more challenging. Pads placed around the bunion may help.   Bunion surgery involves cutting and repositioning the bones surrounding the bunion. Pins and screws are used to hold the bones in place during the healing process. The goal of bunion surgery is to reduce the size of the bunion bump. Realignment of the toe and joint is attempted. Most first toes can not be forced back into a normal alignment even with surgery. Discussed conservative treatments such as good shoes with wide forefoot and no heels.  Dispensed toe .  Discussed orthotics.  Briefly discussed surgery.  Patient would probably need head osteotomy.  Decision was made to pursue conservative treatments first and if still painful may consider head osteotomy.  Return to clinic prn.    Phan Mccracken DPM, FACFAS        Again, thank you for allowing me to participate in the care of your patient.        Sincerely,        Phan Mccracken DPM

## 2023-12-01 NOTE — PATIENT INSTRUCTIONS
We wish you continued good healing. If you have any questions or concerns, please do not hesitate to contact us at  [phone]    Pix[employer] (secure e-mail communication and access to your chart) to send a message or to make an appointment.    Please remember to call and schedule a follow up appointment if one was recommended at your earliest convenience.     PODIATRY CLINIC HOURS  TELEPHONE NUMBER    Dr. Pahn PADRONPMARISOL FACFAS        Clinics:  [facility]   [place]  Tuesday 1PM-6PM  [place]  Wednesday 745AM-330PM  [place]  Monday 2nd,4th  830AM-4PM  Thursday/Friday 745AM-230PM     [place] APPOINTMENTS  [phone]    [place] APPOINTMENTS  [phone]          If you need a medication refill, please contact us you may need lab work and/or a follow up visit prior to your refill (i.e. Antifungal medications).  If MRI needed please call Imaging at [phone]   HOW DO I GET MY KNEE SCOOTER? Knee scooters can be picked up at ANY Medical Supply stores with your knee scooter Prescription.  OR  Bring your signed prescription to an [facility] showroom.   Set up an appointment for your custom Orthotics. Call any Orthotics locations call [phone]

## 2023-12-01 NOTE — PROGRESS NOTES
Subjective:    Pt is seen today as a new pt referral with the c/c of painful right foot.  Points to plantar second and fifth metatarsal heads.  Denies stepping on foreign bodies.  Denies erythema edema drainage or masses.  Pain aggravated by activity and relieved by rest.  This started recently.  Patient also has right bunion.  This has been symptomatic for the past 1 years.  Points to medial head of first metatarsal.  Has pain w/ ambulation and shoewear and is relieved by rest and going barefoot.  Denies pain in the contralateral foot.  Some numbness on right medial hallux and also some on plantar second and third metatarsal heads.  Denies weakness.     ROS:   See above     Allergies   Allergen Reactions    Tetracycline Shortness Of Breath    Erythromycin        Current Outpatient Medications   Medication Sig Dispense Refill    bisacodyl (DULCOLAX) 5 MG EC tablet Take 1 tablet (5 mg) by mouth See Admin Instructions -Day prior to procedure take 1 tablet bisacodyl at 12:00. 1 tablet 0    Naproxen Sodium (ALEVE PO) Take by mouth as needed for moderate pain      ondansetron (ZOFRAN) 4 MG tablet Take 1 tablet (4 mg) by mouth every 6 hours as needed for nausea (Patient not taking: Reported on 12/1/2023) 10 tablet 0    polyethylene glycol (MIRALAX) 17 GM/Dose powder Take 17 g (1 capful) by mouth daily 225 g 0    sildenafil (VIAGRA) 100 MG tablet Take 1 tablet (100 mg) by mouth daily as needed (sex) 30 tablet 4       Patient Active Problem List   Diagnosis    Family history of malignant neoplasm of prostate    ACP (advance care planning)    Family history of malignant melanoma of skin    Gastroesophageal reflux disease with esophagitis    Osteoarthritis of both knees, unspecified osteoarthritis type    Renal colic    Calculus of ureter    Calculus of kidney       Past Medical History:   Diagnosis Date    Arthritis     Distal radius fracture 7/20/2011    GERD (gastroesophageal reflux disease)     Kidney stone     Renal  colic        Past Surgical History:   Procedure Laterality Date    ARTHROSCOPY KNEE BILATERAL      COLONOSCOPY  4/16/2013    Procedure: COLONOSCOPY;;  Surgeon: Lewis Metcalf MD;  Location: [facility]    COLONOSCOPY N/A 1/24/2019    Procedure: Combined Colonoscopy, Single Or Multiple Biopsy/Polypectomy By Biopsy;  Surgeon: Javier Judge MD;  [facility]: [facility]    COLONOSCOPY N/A 4/6/2021    Procedure: Colonoscopy, With Polypectomy And Biopsy;  Surgeon: Sasha Martinez DO;  [facility]    COLONOSCOPY WITH CO2 INSUFFLATION N/A 1/24/2019    Procedure: COLONOSCOPY WITH CO2 INSUFFLATION;  Surgeon: Javier Judge MD;  [facility]    COLONOSCOPY WITH CO2 INSUFFLATION N/A 4/5/2021    Procedure: COLONOSCOPY, WITH CO2 INSUFFLATION;  Surgeon: Cedrick Sheikh DO;  Location: [facility]    COLONOSCOPY WITH CO2 INSUFFLATION N/A 4/6/2021    Procedure: COLONOSCOPY, WITH CO2 INSUFFLATION;  Surgeon: Sasha Martinez DO;  Location: [facility]    HC CYSTOSCOPY,INSERT URETERAL STENT Right 1/21/2020    Procedure: CYSTOSCOPY, WITH FLEXIBLE URETEROSCOPY, RETROGRADES, AND STENT INSERTION RIGHT;  Surgeon: Wil Alejo MD;  Location: [facility] OR;  Service: Urology    KNEE SURGERY Bilateral        Family History   Problem Relation Age of Onset    Breast Cancer Mother     Allergies Mother     Anesthesia Reaction Mother     Arthritis Mother     Blood Disease Mother     Cardiovascular Father     Eye Disorder Father     Heart Disease Father     Cancer Father         skin    Macular Degeneration Father     Glaucoma Father     Diabetes Sister     Diabetes Maternal Grandmother     Thyroid Disease No family hx of     Cerebrovascular Disease No family hx of     Hypertension No family hx of     Cancer Mother         breast    Urolithiasis Father     Diabetes Sister     Gout No family hx of     Clotting Disorder No family hx of        Social History     Tobacco Use    Smoking status: Never    Smokeless tobacco: Never  "  Substance Use Topics    Alcohol use: Yes     Comment: rare, one drink in 6 months       Objective:    O:  BP (!) 144/78   Pulse 70   Ht 1.88 m (6' 2\")   Wt 86.6 kg (191 lb)   BMI 24.52 kg/m  .      Constitutional/ general:  Pt is in no apparent distress, appears well-nourished.  Cooperative with history and physical exam.     Psych:  The patient answered questions appropriately.  Normal affect.  Seems to have reasonable expectations, in terms of treatment.     Lungs:  Non labored breathing, non labored speech. No cough.  No audible wheezing. Even, quiet breathing.       Vascular:  Pedal pulses are palpable bilaterally for both the DP and PT arteries.  CFT < 3 sec.  No edema.  Pedal hair growth noted.     Neuro:  Alert and oriented x 3. Coordinated gait.  Light touch sensation is somewhat diminished plantar second and third toes and met heads and around right bunion.  Intact everywhere else.    Derm: Normal texture and turgor.  No erythema, ecchymosis, or cyanosis.  No open lesions.  Diffuse shearing hyperkeratotic lesion with clear central core noted under right second and fifth metatarsal head.  No underlying masses.    Musculoskeletal:    Lower extremity muscle strength is normal.  Patient is ambulatory without an assistive device or brace.    Normal arch with weightbearing.   No equinus.    right bunion deformity noted.  No pain with range of motion.  Positive tracking with ROM.  No medial bursa or masses noted.  No pain on the sesamoids or dorsally.  Fat pad atrophic          Assessment:    Right foot porokeratosis x2   hallux abducto valgus deformity right  Bilateral numbness, mechanical    Plan: Discussed cause of porokeratosis.  He will keep these down with a pumice stone.  Can use AmLactin or Compound W on these.  Discussed stiff shoe to offload these.  Also discussed how his numbness in feet is mechanical.  Discussed good stiff supportive shoes to help with this.  If still painful sometimes removal of " porokeratosis will single lobed rotational flap is helpful.  We will pursue conservative treatments first.  Discussed cause of bunion.  Shoe inserts or orthotics will often times help with the bunion pain, however, inserts make a shoe fit more challenging. Pads placed around the bunion may help.   Bunion surgery involves cutting and repositioning the bones surrounding the bunion. Pins and screws are used to hold the bones in place during the healing process. The goal of bunion surgery is to reduce the size of the bunion bump. Realignment of the toe and joint is attempted. Most first toes can not be forced back into a normal alignment even with surgery. Discussed conservative treatments such as good shoes with wide forefoot and no heels.  Dispensed toe .  Discussed orthotics.  Briefly discussed surgery.  Patient would probably need head osteotomy.  Decision was made to pursue conservative treatments first and if still painful may consider head osteotomy.  Return to clinic prn.    Phan Mccracken DPM, FACFAS

## 2023-12-08 ENCOUNTER — OFFICE VISIT (OUTPATIENT)
Dept: FAMILY MEDICINE | Facility: CLINIC | Age: 63
End: 2023-12-08
Payer: MEDICARE

## 2023-12-08 ENCOUNTER — ANCILLARY PROCEDURE (OUTPATIENT)
Dept: GENERAL RADIOLOGY | Facility: CLINIC | Age: 63
End: 2023-12-08
Attending: FAMILY MEDICINE
Payer: MEDICARE

## 2023-12-08 VITALS
WEIGHT: 190 LBS | SYSTOLIC BLOOD PRESSURE: 127 MMHG | OXYGEN SATURATION: 98 % | HEIGHT: 74 IN | HEART RATE: 66 BPM | RESPIRATION RATE: 12 BRPM | DIASTOLIC BLOOD PRESSURE: 77 MMHG | TEMPERATURE: 97.7 F | BODY MASS INDEX: 24.38 KG/M2

## 2023-12-08 DIAGNOSIS — R10.32 LLQ ABDOMINAL PAIN: ICD-10-CM

## 2023-12-08 DIAGNOSIS — Z13.220 LIPID SCREENING: ICD-10-CM

## 2023-12-08 DIAGNOSIS — R13.10 DYSPHAGIA, UNSPECIFIED TYPE: ICD-10-CM

## 2023-12-08 DIAGNOSIS — N52.39 POST-PROCEDURAL ERECTILE DYSFUNCTION, UNSPECIFIED TYPE: ICD-10-CM

## 2023-12-08 DIAGNOSIS — Z00.00 WELCOME TO MEDICARE PREVENTIVE VISIT: Primary | ICD-10-CM

## 2023-12-08 DIAGNOSIS — G89.29 CHRONIC PAIN OF LEFT KNEE: ICD-10-CM

## 2023-12-08 DIAGNOSIS — M25.562 CHRONIC PAIN OF LEFT KNEE: ICD-10-CM

## 2023-12-08 LAB
CHOLEST SERPL-MCNC: 190 MG/DL
FASTING STATUS PATIENT QL REPORTED: YES
HDLC SERPL-MCNC: 48 MG/DL
LDLC SERPL CALC-MCNC: 126 MG/DL
NONHDLC SERPL-MCNC: 142 MG/DL
TRIGL SERPL-MCNC: 82 MG/DL

## 2023-12-08 PROCEDURE — 73562 X-RAY EXAM OF KNEE 3: CPT | Mod: TC | Performed by: RADIOLOGY

## 2023-12-08 PROCEDURE — G0402 INITIAL PREVENTIVE EXAM: HCPCS | Performed by: FAMILY MEDICINE

## 2023-12-08 PROCEDURE — 99213 OFFICE O/P EST LOW 20 MIN: CPT | Mod: 25 | Performed by: FAMILY MEDICINE

## 2023-12-08 PROCEDURE — G0008 ADMIN INFLUENZA VIRUS VAC: HCPCS | Performed by: FAMILY MEDICINE

## 2023-12-08 PROCEDURE — 90662 IIV NO PRSV INCREASED AG IM: CPT | Performed by: FAMILY MEDICINE

## 2023-12-08 PROCEDURE — 36415 COLL VENOUS BLD VENIPUNCTURE: CPT | Performed by: FAMILY MEDICINE

## 2023-12-08 PROCEDURE — 80061 LIPID PANEL: CPT | Performed by: FAMILY MEDICINE

## 2023-12-08 RX ORDER — TADALAFIL 20 MG/1
20 TABLET ORAL EVERY 24 HOURS
Qty: 12 TABLET | Refills: 11 | Status: SHIPPED | OUTPATIENT
Start: 2023-12-08 | End: 2024-04-05

## 2023-12-08 RX ORDER — RESPIRATORY SYNCYTIAL VIRUS VACCINE 120MCG/0.5
0.5 KIT INTRAMUSCULAR ONCE
Qty: 1 EACH | Refills: 0 | Status: CANCELLED | OUTPATIENT
Start: 2023-12-08 | End: 2023-12-08

## 2023-12-08 ASSESSMENT — ENCOUNTER SYMPTOMS
ABDOMINAL PAIN: 1
HEMATOCHEZIA: 1
CHILLS: 0
DIZZINESS: 0
HEMATURIA: 0
FEVER: 0
PALPITATIONS: 0
NERVOUS/ANXIOUS: 0
COUGH: 0
WEAKNESS: 0
DYSURIA: 0
JOINT SWELLING: 1
NAUSEA: 1
MYALGIAS: 0
DIARRHEA: 0
SORE THROAT: 0
CONSTIPATION: 1
FREQUENCY: 0
SHORTNESS OF BREATH: 1
ARTHRALGIAS: 1
HEARTBURN: 1
EYE PAIN: 1
HEADACHES: 0
PARESTHESIAS: 1

## 2023-12-08 ASSESSMENT — ACTIVITIES OF DAILY LIVING (ADL): CURRENT_FUNCTION: NO ASSISTANCE NEEDED

## 2023-12-08 NOTE — PATIENT INSTRUCTIONS
Patient Education   Personalized Prevention Plan  You are due for the preventive services outlined below.  Your care team is available to assist you in scheduling these services.  If you have already completed any of these items, please share that information with your care team to update in your medical record.  Health Maintenance Due   Topic Date Due     ANNUAL REVIEW OF HM ORDERS  Never done     HIV Screening  Never done     Hepatitis C Screening  Never done     Zoster (Shingles) Vaccine (1 of 2) Never done     Cholesterol Lab  11/19/2012     RSV VACCINE (Pregnancy & 60+) (1 - 1-dose 60+ series) Never done     Diptheria Tetanus Pertussis (DTAP/TDAP/TD) Vaccine (2 - Td or Tdap) 09/13/2021     Pneumococcal Vaccine (1 - PCV) Never done     Flu Vaccine (1) Never done     COVID-19 Vaccine (6 - 2023-24 season) 09/01/2023

## 2023-12-08 NOTE — PROGRESS NOTES
SUBJECTIVE:   Freeman is a 63 year old, presenting for the following:  Wellness Visit and Referral (Request for colonoscopy )        12/8/2023     9:34 AM   Additional Questions   Roomed by Yakelin DURHAM CMA         12/8/2023     9:34 AM   Patient Reported Additional Medications   Patient reports taking the following new medications ibuprofen PRN     Are you in the first 12 months of your Medicare coverage?  Yes,  Visual Acuity:  Right Eye: 10/10   Left Eye: 10/10  Both Eyes: 10/12.5    HPI    Today's PHQ-2 Score:       12/8/2023     9:30 AM   PHQ-2 ( 1999 [employer])   Q1: Little interest or pleasure in doing things 0   Q2: Feeling down, depressed or hopeless 0   PHQ-2 Score 0   Q1: Little interest or pleasure in doing things Not at all   Q2: Feeling down, depressed or hopeless Not at all   PHQ-2 Score 0     Constipation for months, different shaped stool - h/o polyps     Developed ED 2 years ago, sildenafil causes sinus pressure, would like to trial Cialis    Chronic knee pain, left, causing him to limit recreational activities    Have you ever done Advance Care Planning? (For example, a Health Directive, POLST, or a discussion with a medical provider or your loved ones about your wishes): No, advance care planning information given to patient to review.  Patient plans to discuss their wishes with loved ones or provider.        Fall risk  Fallen 2 or more times in the past year?: No  Any fall with injury in the past year?: No    Cognitive Screening   1) Repeat 3 items (Leader, Season, Table)    2) Clock draw: NORMAL  3) 3 item recall: Recalls 2 objects   Results: NORMAL clock, 1-2 items recalled: COGNITIVE IMPAIRMENT LESS LIKELY    Mini-CogTM Copyright RYAN Smith. Licensed by the author for use in [facility]; reprinted with permission (jo@.[facility]). All rights reserved.      Do you have sleep apnea, excessive snoring or daytime drowsiness? : yes    Reviewed and updated as needed this visit by clinical staff    Tobacco  Allergies               Reviewed and updated as needed this visit by Provider                 Social History     Tobacco Use    Smoking status: Never    Smokeless tobacco: Never   Substance Use Topics    Alcohol use: Yes     Comment: rare, one drink in 6 months             12/8/2023     9:29 AM   Alcohol Use   Prescreen: >3 drinks/day or >7 drinks/week? No     Do you have a current opioid prescription? No  Do you use any other controlled substances or medications that are not prescribed by a provider? None            Current providers sharing in care for this patient include:   Patient Care Team:  No Ref-Primary, Physician as PCP - Sofya Aguilera MD as Assigned PCP  Nora Myers PA-C as Physician Assistant (Dermatology)  Jose Gimenez OD as Assigned Surgical Provider    The following health maintenance items are reviewed in Epic and correct as of today:  Health Maintenance   Topic Date Due    ANNUAL REVIEW OF  ORDERS  Never done    HIV SCREENING  Never done    HEPATITIS C SCREENING  Never done    ZOSTER IMMUNIZATION (1 of 2) Never done    LIPID  11/19/2012    RSV VACCINE (Pregnancy & 60+) (1 - 1-dose 60+ series) Never done    DTAP/TDAP/TD IMMUNIZATION (2 - Td or Tdap) 09/13/2021    Pneumococcal Vaccine: 65+ Years (1 - PCV) Never done    COVID-19 Vaccine (6 - 2023-24 season) 09/01/2023    EYE EXAM  11/17/2024    MEDICARE ANNUAL WELLNESS VISIT  12/08/2024    FALL RISK ASSESSMENT  12/08/2024    COLORECTAL CANCER SCREENING  04/06/2026    ADVANCE CARE PLANNING  12/08/2028    PHQ-2 (once per calendar year)  Completed    INFLUENZA VACCINE  Completed    AORTIC ANEURYSM SCREENING (SYSTEM ASSIGNED)  Completed    IPV IMMUNIZATION  Aged Out    HPV IMMUNIZATION  Aged Out    MENINGITIS IMMUNIZATION  Aged Out    RSV MONOCLONAL ANTIBODY  Aged Out               Review of Systems      OBJECTIVE:   /77 (BP Location: Right arm, Patient Position: Sitting, Cuff Size: Adult Regular)   Pulse 66   Temp  "97.7  F (36.5  C) (Oral)   Resp 12   Ht 1.88 m (6' 2.02\")   Wt 86.2 kg (190 lb)   SpO2 98%   BMI 24.38 kg/m   Estimated body mass index is 24.38 kg/m  as calculated from the following:    Height as of this encounter: 1.88 m (6' 2.02\").    Weight as of this encounter: 86.2 kg (190 lb).  Physical Exam  GENERAL: healthy, alert and no distress  NECK: no adenopathy, no asymmetry, masses, or scars and thyroid normal to palpation  RESP: lungs clear to auscultation - no rales, rhonchi or wheezes  CV: regular rate and rhythm, normal S1 S2, no S3 or S4, no murmur, click or rub, no peripheral edema and peripheral pulses strong  ABDOMEN: soft, nontender, no hepatosplenomegaly, no masses and bowel sounds normal  MS: no gross musculoskeletal defects noted, no edema    Diagnostic Test Results:  Labs reviewed in Epic    ASSESSMENT / PLAN:     Problem List Items Addressed This Visit    None  Visit Diagnoses       Welcome to Medicare preventive visit    -  Primary    Lipid screening        Relevant Orders    Lipid panel reflex to direct LDL Non-fasting    LLQ abdominal pain        Relevant Orders    Adult GI  Referral - Consult Only    Dysphagia, unspecified type        Relevant Orders    Adult GI  Referral - Consult Only    Post-procedural erectile dysfunction, unspecified type        Relevant Medications    tadalafil (ADCIRCA/CIALIS) 20 MG tablet    Chronic pain of left knee        Relevant Orders    Orthopedic  Referral    XR Knee Left 3 Views          Workup starting for left knee pain, ortho consulted  GI for dysphagia/LLQ pain  Trial of Cialis instead of Viagra for his ED      Patient has been advised of split billing requirements and indicates understanding: Yes      COUNSELING:  Reviewed preventive health counseling, as reflected in patient instructions        He reports that he has never smoked. He has never used smokeless tobacco.      Appropriate preventive services were discussed with this " patient, including applicable screening as appropriate for fall prevention, nutrition, physical activity, Tobacco-use cessation, weight loss and cognition.  Checklist reviewing preventive services available has been given to the patient.    Reviewed patients plan of care and provided an AVS. The Basic Care Plan (routine screening as documented in Health Maintenance) for Freeman meets the Care Plan requirement. This Care Plan has been established and reviewed with the Patient.        CECY BISHOP DO  [facility]    Identified Health Risks:  I have reviewed Opioid Use Disorder and Substance Use Disorder risk factors and made any needed referrals.

## 2023-12-11 NOTE — TELEPHONE ENCOUNTER
DIAGNOSIS: Chronic pain of left knee / Jagdish Hines, DO / Medicare & BCBS / img [id number]    APPOINTMENT DATE: 12/12/2023   NOTES STATUS DETAILS   OFFICE NOTE from referring provider Internal DR. HINES   OFFICE NOTE from other specialist Internal DR. PARRA  08/17/2020   INJ report Internal 08/17/2020   MEDICATION LIST Internal    XRAYS (IMAGES & REPORTS) Internal-KNEE 12/08/2023 08/17/2020 02/26/2013           Terbinafine Counseling: Patient counseling regarding adverse effects of terbinafine including but not limited to headache, diarrhea, rash, upset stomach, liver function test abnormalities, itching, taste/smell disturbance, nausea, abdominal pain, and flatulence.  There is a rare possibility of liver failure that can occur when taking terbinafine.  The patient understands that a baseline LFT and kidney function test may be required. The patient verbalized understanding of the proper use and possible adverse effects of terbinafine.  All of the patient's questions and concerns were addressed.

## 2023-12-12 ENCOUNTER — PRE VISIT (OUTPATIENT)
Dept: ORTHOPEDICS | Facility: CLINIC | Age: 63
End: 2023-12-12

## 2023-12-12 ENCOUNTER — OFFICE VISIT (OUTPATIENT)
Dept: ORTHOPEDICS | Facility: CLINIC | Age: 63
End: 2023-12-12
Attending: FAMILY MEDICINE
Payer: MEDICARE

## 2023-12-12 DIAGNOSIS — G89.29 CHRONIC PAIN OF LEFT KNEE: ICD-10-CM

## 2023-12-12 DIAGNOSIS — M25.562 CHRONIC PAIN OF LEFT KNEE: ICD-10-CM

## 2023-12-12 DIAGNOSIS — M17.12 PRIMARY OSTEOARTHRITIS OF LEFT KNEE: Primary | ICD-10-CM

## 2023-12-12 PROCEDURE — 99204 OFFICE O/P NEW MOD 45 MIN: CPT | Performed by: STUDENT IN AN ORGANIZED HEALTH CARE EDUCATION/TRAINING PROGRAM

## 2023-12-12 NOTE — PROGRESS NOTES
Sports Medicine Clinic           ASSESSMENT and PLAN:     Freeman was seen today for pain.    Diagnoses and all orders for this visit:    Primary osteoarthritis of left knee  Chronic pain of left knee  Severe OA of the medial and PF compartments with genu valgum. Had tried HA, NSAIDs and PT without significant improvement. Discussed treatment options of knee OA including CSI however patient prefer referral to discuss TKA with joint replacement surgeon.   - referral placed to knee replacement surgeon    Return sooner if develops new or worsening symptoms.    Options for treatment and/or follow-up care were reviewed with the patient was actively involved in the decision making process. Patient verbalized understanding and was in agreement with the plan.    Ruth Langley MD, CAM  Primary Care Sports Medicine           SUBJECTIVE       Freeman Velazquez is a 63 year old male presenting to clinic today with a chief complaint of left knee pain.    Onset: Chronic  Location of Pain: left knee   Rating of Pain Currently: 3/10  Worsened by: Walking   Better with: Activity modification   Treatments tried: Physical therapy over the years   Associated symptoms: swelling  Orthopedic history: YES - History of arthroscopy in the left knee, medial meniscotomy in the past     Last surgery was in 1989.     He has had to accommodate his knee over the years. He now has to play doubles tennis, half court basketball, ride a golf cart instead of walking.     Had a HA injection a few years ago. Only had improvement for a couple of days. Has never had a steroid injection.     Last did PT two years ago.     PMH, Medications and Allergies were reviewed and updated as needed.    ROS:  As noted above otherwise negative.    Patient Active Problem List   Diagnosis    Family history of malignant neoplasm of prostate    ACP (advance care planning)    Family history of malignant melanoma of skin    Gastroesophageal reflux disease with esophagitis     Osteoarthritis of both knees, unspecified osteoarthritis type    Renal colic    Calculus of ureter    Calculus of kidney       Current Outpatient Medications   Medication Sig Dispense Refill    Naproxen Sodium (ALEVE PO) Take by mouth as needed for moderate pain (Patient not taking: Reported on 12/12/2023)      tadalafil (ADCIRCA/CIALIS) 20 MG tablet Take 1 tablet (20 mg) by mouth every 24 hours (Patient not taking: Reported on 12/12/2023) 12 tablet 11            OBJECTIVE:       Vitals: There were no vitals filed for this visit.  BMI: There is no height or weight on file to calculate BMI.    Gen:  Well nourished and in no acute distress  HEENT: Extraocular movement intact  Pulm:  Breathing Comfortably. No increased respiratory effort.  Psych: Euthymic. Appropriately answers questions    MSK:   LEFT KNEE  Inspection:    genu varum, mild edema, no erythema, and no ecchymosis  Palpation:    Tender about the medial patellar facet and medial joint line. Remainder of bony and ligamentous landmarks are nontender.    Mild effusion is present    Patellofemoral crepitus is Present  Range of Motion:     50 extension to 1150 flexion  Strength:    Quadriceps 5/5 and hamstrings 5/5    Extensor mechanism intact      Imaging was personally reviewed and interpreted by me.   KNEE LEFT 3 VIEWS   12/8/2023 10:39 AM      HISTORY: Chronic pain of left knee.     COMPARISON: Radiographs from 8/17/2020.                                                                      IMPRESSION: Severe osteoarthrosis of the medial compartment,  progressed. Moderate patellofemoral osteoarthrosis, progressed. No  fracture or calcified intra-articular body. Small effusion, unchanged.     ELIZABETH OVALLE MD

## 2023-12-12 NOTE — LETTER
12/12/2023      RE: Freeman Velazquez  [address]  [facility] [address]     Dear Colleague,    Thank you for referring your patient, Freeman Velazquez, to the [facility] [place]. Please see a copy of my visit note below.    Sports Medicine Clinic           ASSESSMENT and PLAN:     Freeman was seen today for pain.    Diagnoses and all orders for this visit:    Primary osteoarthritis of left knee  Chronic pain of left knee  Severe OA of the medial and PF compartments with genu valgum. Had tried HA, NSAIDs and PT without significant improvement. Discussed treatment options of knee OA including CSI however patient prefer referral to discuss TKA with joint replacement surgeon.   - referral placed to knee replacement surgeon    Return sooner if develops new or worsening symptoms.    Options for treatment and/or follow-up care were reviewed with the patient was actively involved in the decision making process. Patient verbalized understanding and was in agreement with the plan.    Ruth Langley MD, [facility]  Primary Care Sports Medicine           SUBJECTIVE       Freeman Velazquez is a 63 year old male presenting to clinic today with a chief complaint of left knee pain.    Onset: Chronic  Location of Pain: left knee   Rating of Pain Currently: 3/10  Worsened by: Walking   Better with: Activity modification   Treatments tried: Physical therapy over the years   Associated symptoms: swelling  Orthopedic history: YES - History of arthroscopy in the left knee, medial meniscotomy in the past     Last surgery was in 1989.     He has had to accommodate his knee over the years. He now has to play doubles tennis, half court basketball, ride a golf cart instead of walking.     Had a HA injection a few years ago. Only had improvement for a couple of days. Has never had a steroid injection.     Last did PT two years ago.     PMH, Medications and Allergies were reviewed and updated  as needed.    ROS:  As noted above otherwise negative.    Patient Active Problem List   Diagnosis     Family history of malignant neoplasm of prostate     ACP (advance care planning)     Family history of malignant melanoma of skin     Gastroesophageal reflux disease with esophagitis     Osteoarthritis of both knees, unspecified osteoarthritis type     Renal colic     Calculus of ureter     Calculus of kidney       Current Outpatient Medications   Medication Sig Dispense Refill     Naproxen Sodium (ALEVE PO) Take by mouth as needed for moderate pain (Patient not taking: Reported on 12/12/2023)       tadalafil (ADCIRCA/CIALIS) 20 MG tablet Take 1 tablet (20 mg) by mouth every 24 hours (Patient not taking: Reported on 12/12/2023) 12 tablet 11            OBJECTIVE:       Vitals: There were no vitals filed for this visit.  BMI: There is no height or weight on file to calculate BMI.    Gen:  Well nourished and in no acute distress  HEENT: Extraocular movement intact  Pulm:  Breathing Comfortably. No increased respiratory effort.  Psych: Euthymic. Appropriately answers questions    MSK:   LEFT KNEE  Inspection:    genu varum, mild edema, no erythema, and no ecchymosis  Palpation:    Tender about the medial patellar facet and medial joint line. Remainder of bony and ligamentous landmarks are nontender.    Mild effusion is present    Patellofemoral crepitus is Present  Range of Motion:     50 extension to 1150 flexion  Strength:    Quadriceps 5/5 and hamstrings 5/5    Extensor mechanism intact      Imaging was personally reviewed and interpreted by me.   KNEE LEFT 3 VIEWS   12/8/2023 10:39 AM      HISTORY: Chronic pain of left knee.     COMPARISON: Radiographs from 8/17/2020.                                                                      IMPRESSION: Severe osteoarthrosis of the medial compartment,  progressed. Moderate patellofemoral osteoarthrosis, progressed. No  fracture or calcified intra-articular body. Small  effusion, unchanged.     ELIZABETH OVALLE MD      Again, thank you for allowing me to participate in the care of your patient.      Sincerely,    Ruth Langley MD

## 2023-12-27 ENCOUNTER — VIRTUAL VISIT (OUTPATIENT)
Dept: GASTROENTEROLOGY | Facility: CLINIC | Age: 63
End: 2023-12-27
Attending: FAMILY MEDICINE
Payer: MEDICARE

## 2023-12-27 VITALS — BODY MASS INDEX: 24.64 KG/M2 | WEIGHT: 192 LBS | HEIGHT: 74 IN

## 2023-12-27 DIAGNOSIS — R10.32 LLQ ABDOMINAL PAIN: ICD-10-CM

## 2023-12-27 DIAGNOSIS — R19.5 CHANGE IN STOOL CALIBER: Primary | ICD-10-CM

## 2023-12-27 DIAGNOSIS — Z87.898 HISTORY OF DYSPHAGIA: ICD-10-CM

## 2023-12-27 PROCEDURE — 99203 OFFICE O/P NEW LOW 30 MIN: CPT | Mod: VID | Performed by: PHYSICIAN ASSISTANT

## 2023-12-27 ASSESSMENT — PAIN SCALES - GENERAL: PAINLEVEL: MILD PAIN (3)

## 2023-12-27 NOTE — PROGRESS NOTES
GI CLINIC VISIT    CC/REFERRING MD:  Jagdish Hines  REASON FOR CONSULTATION: LLQ abdominal pain, change in stool caliber    ASSESSMENT/PLAN:  64 y/o M presents for evaluation of change in stool caliber.    #change in stool caliber  #LLQ abdominal pain  Patient reports that over the last few months he has noticed a change in his stool caliber -- it appears to be much more thin in nature. He has also noticed some intermittent LLQ abdominal pain, which is unchanged with having a BM. He has no previous history of advanced adenoma, and last two colonoscopies in 2019 and 2021 respectively he had a few polyps that were tubular adenomas. I suspect constipation is playing a large role in his symptoms -- will plan to obtain AXR to evaluate his stool burden. Will also obtain a CT abd/pelvis to evaluate for any other abnormalities. He otherwise does not have any alarm symptoms that would warrant a colonoscopy at this time.   --obtain AXR  --schedule CT abd/pelvis.     #Hx of dysphagia  Patient reports a hx of dysphagia in the past, but has not felt this to be bothersome recently, he reports previous hx of requiring dilation. He did have an EGD in 2013, which showed a Schatzki ring and this is when dilation occurred. He would not like to repeat EGD at this time.       Colorectal cancer screening: due 2026    RTC 3 months    Thank you for this consultation.  It was a pleasure to participate in the care of this patient; please contact us with any further questions.       40 minutes spent on the date of the encounter doing chart review, review of test results, patient visit, and documentation    This note was created with voice recognition software, and while reviewed for accuracy, typos may remain.    Anil oRjas PA-C  Division of Gastroenterology, Hepatology and Nutrition  [facility]  64 y/o M presents for evaluation of change in stool caliber.    Patient reports that a  "few months ago, he developed LLQ abdominal pain -- he notes pain is intermittent in nature, he is unable to describe the pain. He cannot identify any specific triggers for his pain, or relieving factors, does not feel pain is better with having a BM.     Patient has also noticed change in bowel habits - he has noticed that stool is very \"thin\" in nature, which has been ongoing for several months.  Patient has had two bouts of diarrhea in the last 6 months, described as bristol type 6.   Normally patient would have one bowel movement daily, described as \"normal thickness\", described as a bristol type 4. Reports on occasion has had to push/strain to have a BM. Denies BRBPR. He feels that recently stools have been darker brown in color.     He does report a hx of reflux - he does not take anything for this. He has made some lifestyle modifications, so he does not eat close to bedtime, and does sleep with HOB elevated. He feels that symptoms are fairly well controlled.     Takes NSAIDs usually twice a week.  Denies Tylenol. Denies use of OTC herbal supplements/weight loss products.      Drinks 1 alcoholic drink every few weeks. Denies tobacco products. No recreational drug use.     No family history of GI related malignancy (esophageal, gastric, pancreatic, liver or colon) or family history of IBD/celiac disease.       ROS:    No fevers or chills  No weight loss  No shortness of breath or wheezing  No chest pain or pressure  No odynophagia or dysphagia -- reports he has issues with dysphagia in the past, requiring dilation, but now it is not so bothersome and he makes sure to chew his food well  No BRBPR, hematochezia, melena  No anxiety or depression      PROBLEM LIST  Patient Active Problem List    Diagnosis Date Noted    Osteoarthritis of both knees, unspecified osteoarthritis type 08/26/2020     Priority: Medium    Renal colic 03/10/2020     Priority: Medium    Calculus of ureter 03/10/2020     Priority: Medium     " Formatting of this note might be different from the original. Added automatically from request for surgery [id number]      Calculus of kidney 01/14/2020     Priority: Medium    Gastroesophageal reflux disease with esophagitis 01/04/2018     Priority: Medium    Family history of malignant melanoma of skin 09/19/2017     Priority: Medium    ACP (advance care planning) 03/09/2016     Priority: Medium     Advance Care Planning 3/9/2016: ACP Review of Chart / Resources Provided:  Reviewed chart for advance care plan.  Freeman Velazquez has no plan or code status on file. Discussed available resources and provided with information. Confirmed code status reflects current choices pending further ACP discussions.  Confirmed/documented legally designated decision makers.  Added by Jennifer Platt            Family history of malignant neoplasm of prostate 01/19/2012     Priority: Medium       PERTINENT PAST MEDICAL HISTORY:    Past Medical History:   Diagnosis Date    Arthritis     Distal radius fracture 7/20/2011    GERD (gastroesophageal reflux disease)     Kidney stone     Renal colic        PREVIOUS SURGERIES:    Past Surgical History:   Procedure Laterality Date    ARTHROSCOPY KNEE BILATERAL      COLONOSCOPY  4/16/2013    Procedure: COLONOSCOPY;;  Surgeon: Lewis Metcalf MD;  Location: [facility] OR    COLONOSCOPY N/A 1/24/2019    Procedure: Combined Colonoscopy, Single Or Multiple Biopsy/Polypectomy By Biopsy;  Surgeon: Javier Judge MD;  Location: [facility] OR    COLONOSCOPY N/A 4/6/2021    Procedure: Colonoscopy, With Polypectomy And Biopsy;  Surgeon: Sasha Martinez DO;  Location: [facility] OR    COLONOSCOPY WITH CO2 INSUFFLATION N/A 1/24/2019    Procedure: COLONOSCOPY WITH CO2 INSUFFLATION;  Surgeon: Javier Judge MD;  Location: [facility] OR    COLONOSCOPY WITH CO2 INSUFFLATION N/A 4/5/2021    Procedure: COLONOSCOPY, WITH CO2 INSUFFLATION;  Surgeon: Cedrick Sheikh DO;  Location: [facility] OR    COLONOSCOPY WITH CO2  INSUFFLATION N/A 4/6/2021    Procedure: COLONOSCOPY, WITH CO2 INSUFFLATION;  Surgeon: Sasha Martinez DO;  Location:  OR    HC CYSTOSCOPY,INSERT URETERAL STENT Right 1/21/2020    Procedure: CYSTOSCOPY, WITH FLEXIBLE URETEROSCOPY, RETROGRADES, AND STENT INSERTION RIGHT;  Surgeon: Wil Alejo MD;  Location: [facility] OR;  Service: Urology    KNEE SURGERY Bilateral        PREVIOUS ENDOSCOPY:  Colonoscopy (4/6/21): - One 3 mm polyp at the hepatic flexure, removed                             with a cold biopsy forceps. Resected and retrieved.                             - The examined portion of the ileum was normal.   FINAL DIAGNOSIS:   HEPATIC FLEXURE POLYP, POLYPECTOMY:   - Tubular adenoma   - Negative for high-grade dysplasia     Repeat in 5 years.    ALLERGIES:     Allergies   Allergen Reactions    Tetracycline Shortness Of Breath    Erythromycin        PERTINENT MEDICATIONS:    Current Outpatient Medications:     Naproxen Sodium (ALEVE PO), Take by mouth as needed for moderate pain (Patient not taking: Reported on 12/12/2023), Disp: , Rfl:     tadalafil (ADCIRCA/CIALIS) 20 MG tablet, Take 1 tablet (20 mg) by mouth every 24 hours (Patient not taking: Reported on 12/12/2023), Disp: 12 tablet, Rfl: 11    Current Facility-Administered Medications:     lidocaine 1 % injection 5 mL, 5 mL, , , Darren Jordan MD, 5 mL at 08/17/20 1403    methylPREDNISolone (DEPO-MEDROL) injection 80 mg, 80 mg, , , Darren Jordan MD, 80 mg at 08/17/20 1403    SOCIAL HISTORY:    Social History     Socioeconomic History    Marital status:      Spouse name: Not on file    Number of children: Not on file    Years of education: Not on file    Highest education level: Not on file   Occupational History     Employer: [employer]   Tobacco Use    Smoking status: Never    Smokeless tobacco: Never   Vaping Use    Vaping Use: Never used   Substance and Sexual Activity    Alcohol use: Yes      Comment: rare, one drink in 6 months    Drug use: No    Sexual activity: Not Currently     Partners: Female     Comment: many years   Other Topics Concern    Parent/sibling w/ CABG, MI or angioplasty before 65F 55M? No   Social History Narrative    Not on file     Social Determinants of Health     Financial Resource Strain: Low Risk  (12/8/2023)    Financial Resource Strain     Within the past 12 months, have you or your family members you live with been unable to get utilities (heat, electricity) when it was really needed?: No   Food Insecurity: Low Risk  (12/8/2023)    Food Insecurity     Within the past 12 months, did you worry that your food would run out before you got money to buy more?: No     Within the past 12 months, did the food you bought just not last and you didn t have money to get more?: No   Transportation Needs: Low Risk  (12/8/2023)    Transportation Needs     Within the past 12 months, has lack of transportation kept you from medical appointments, getting your medicines, non-medical meetings or appointments, work, or from getting things that you need?: No   Physical Activity: Not on file   Stress: Not on file   Social Connections: Not on file   Interpersonal Safety: Low Risk  (12/8/2023)    Interpersonal Safety     Do you feel physically and emotionally safe where you currently live?: Yes     Within the past 12 months, have you been hit, slapped, kicked or otherwise physically hurt by someone?: No     Within the past 12 months, have you been humiliated or emotionally abused in other ways by your partner or ex-partner?: No   Housing Stability: Low Risk  (12/8/2023)    Housing Stability     Do you have housing? : Yes     Are you worried about losing your housing?: No       FAMILY HISTORY:  FH of CRC: None.  FH of IBD: None.  Family History   Problem Relation Age of Onset    Breast Cancer Mother     Allergies Mother     Anesthesia Reaction Mother     Arthritis Mother     Blood Disease Mother      Cardiovascular Father     Eye Disorder Father     Heart Disease Father     Cancer Father         skin    Macular Degeneration Father     Glaucoma Father     Diabetes Sister     Diabetes Maternal Grandmother     Thyroid Disease No family hx of     Cerebrovascular Disease No family hx of     Hypertension No family hx of     Cancer Mother         breast    Urolithiasis Father     Diabetes Sister     Gout No family hx of     Clotting Disorder No family hx of        Past/family/social history reviewed and no changes    PHYSICAL EXAMINATION:  General: Patient appears well in no acute distress.   Skin: No visualized rash or lesions on visualized skin  Eyes: EOMI, no erythema, sclera icterus or discharge noted  Resp: Appears to be breathing comfortably without accessory muscle usage, speaking in full sentences, no cough  MSK: Appears to have normal range of motion based on visualized movements  Neurologic: No apparent tremors, facial movements symmetric  Psych: normal affect , alert and oriented          Video-Visit Details    Video Start Time: 8:59AM    Type of service:  Video Visit    Video End Time: 9:17AM    Originating Location (pt. Location): Home        Distant Location (provider location):  Off-site    Platform used for Video Visit: Hugo

## 2023-12-27 NOTE — PROGRESS NOTES
"Virtual Visit Details    Type of service:  Video Visit   Video Start Time: {video visit start/end time for provider to select:587214}  Video End Time:{video visit start/end time for provider to select:909970}    Originating Location (pt. Location): {video visit patient location:800428::\"Home\"}  {PROVIDER LOCATION On-site should be selected for visits conducted from your clinic location or adjoining [facility] hospital, academic office, or other nearby [facility] building. Off-site should be selected for all other provider locations, including home:556707}  Distant Location (provider location):  {virtual location provider:140085}  Platform used for Video Visit: {Virtual Visit Platforms:807141::\"[employer]\"}    "

## 2023-12-27 NOTE — NURSING NOTE
Is the patient currently in the state of [place]? YES    Visit mode:VIDEO    If the visit is dropped, the patient can be reconnected by: VIDEO VISIT: Text to cell phone:   Telephone Information:   Mobile [phone]       Will anyone else be joining the visit? NO  (If patient encounters technical issues they should call [phone] :[phone])    How would you like to obtain your AVS? MyChart    Are changes needed to the allergy or medication list? No    Reason for visit: Consult    Kristan BARR

## 2023-12-27 NOTE — PATIENT INSTRUCTIONS
It was a pleasure taking care of you today.  I've included a brief summary of our discussion and care plan from today's visit below.  Please review this information with your primary care provider.  ______________________________________________________________________    My recommendations are summarized as follows:  --please obtain an x-ray of the abdomen so we can evaluate your stool burden  --I did talk to Dr. Cantu, she would not recommend repeating a colonoscopy right now, we will plan to do a CT abdomen/pelvis to evaluate your abdominal pain better and make sure there are no abnormalities we can see    -- please see scheduling information provided below     Return to GI Clinic in 3 months to review your progress.    ______________________________________________________________________    How do I schedule labs, imaging studies, or procedures that were ordered in clinic today?     Labs: To schedule lab appointment at the [facility], use my chart or call [phone]. If you have a [facility] lab closer to home where you are regularly seen you can give them a call.     Procedures: If a colonoscopy, upper endoscopy, breath test, esophageal manometry, or pH impedence was ordered today, our endoscopy team will call you to schedule this. If you have not heard from our endoscopy team within a week, please call [phone] to schedule.     Imaging Studies: If you were scheduled for a CT scan, X-ray, MRI, ultrasound, HIDA scan or other imaging study, please call [phone] to have this scheduled.     Referral: If a referral to another specialty was ordered, expect a phone call or follow instructions above. If you have not heard from anyone regarding your referral in a week, please call our clinic to check the status.     Who do I call with any questions after my visit?  Please be in touch if there are any further questions that arise following today's visit.   There are multiple ways to contact your gastroenterology care team.      During business hours, you may reach a Gastroenterology nurse at [phone]    To schedule or reschedule an appointment, please call [phone].     You can always send a secure message through [employer].  [employer] messages are answered by your nurse or doctor typically within 24 hours.  Please allow extra time on weekends and holidays.      For urgent/emergent questions after business hours, you may reach the on-call GI Fellow by contacting the [facility]  at [phone].     How will I get the results of any tests ordered?    You will receive all of your results.  If you have signed up for [employer], any tests ordered at your visit will be available to you after your provider reviews them.  Typically this takes 1-2 weeks.  If there are urgent results that require a change in your care plan, your provider or nurse will call you to discuss the next steps.      What is [employer]?  [employer] is a secure way for you to access all of your healthcare records from the [facility].  It is a web based computer program, so you can sign on to it from any location.  It also allows you to send secure messages to your care team.  I recommend signing up for [employer] access if you have not already done so and are comfortable with using a computer.      How to I schedule a follow-up visit?  If you did not schedule a follow-up visit today, please call [phone] to schedule a follow-up office visit.      Sincerely,    Anil Rojas PA-C  Division of Gastroenterology, Hepatology & Nutrition  [facility]

## 2024-01-04 ENCOUNTER — ANCILLARY PROCEDURE (OUTPATIENT)
Dept: CT IMAGING | Facility: CLINIC | Age: 64
End: 2024-01-04
Attending: PHYSICIAN ASSISTANT
Payer: MEDICARE

## 2024-01-04 ENCOUNTER — ANCILLARY PROCEDURE (OUTPATIENT)
Dept: GENERAL RADIOLOGY | Facility: CLINIC | Age: 64
End: 2024-01-04
Attending: PHYSICIAN ASSISTANT
Payer: MEDICARE

## 2024-01-04 DIAGNOSIS — R19.5 CHANGE IN STOOL CALIBER: Primary | ICD-10-CM

## 2024-01-04 DIAGNOSIS — R10.32 LLQ ABDOMINAL PAIN: ICD-10-CM

## 2024-01-04 DIAGNOSIS — R19.5 CHANGE IN STOOL CALIBER: ICD-10-CM

## 2024-01-04 PROCEDURE — G1010 CDSM STANSON: HCPCS | Performed by: RADIOLOGY

## 2024-01-04 PROCEDURE — 74177 CT ABD & PELVIS W/CONTRAST: CPT | Mod: TC | Performed by: RADIOLOGY

## 2024-01-04 PROCEDURE — 74019 RADEX ABDOMEN 2 VIEWS: CPT | Mod: TC | Performed by: RADIOLOGY

## 2024-01-04 RX ORDER — IOPAMIDOL 755 MG/ML
118 INJECTION, SOLUTION INTRAVASCULAR ONCE
Status: COMPLETED | OUTPATIENT
Start: 2024-01-04 | End: 2024-01-04

## 2024-01-04 RX ADMIN — IOPAMIDOL 118 ML: 755 INJECTION, SOLUTION INTRAVASCULAR at 09:35

## 2024-01-04 NOTE — TELEPHONE ENCOUNTER
DIAGNOSIS: Left knee   APPOINTMENT DATE: 01/11/24   NOTES STATUS DETAILS   OFFICE NOTE from referring provider Internal 12/12/23: Dr. Ruth Langley   OFFICE NOTE from other specialist Internal 09/17/20: Quinn Phillips, OT    08/17/20: Dr. Darren Jordan   MEDICATION LIST Internal    LABS     CBC/DIFF Internal Most recent 03/08/21   INJECTIONS  Internal 08/17/20: Large Joint Injection/Arthocentesis: L knee joint    XRAYS (IMAGES & REPORTS) PACS 12/08/23, 02/26/13: XR Knee LT  08/17/20: XR Knee MAE

## 2024-01-09 ENCOUNTER — TELEPHONE (OUTPATIENT)
Dept: GASTROENTEROLOGY | Facility: CLINIC | Age: 64
End: 2024-01-09
Payer: MEDICARE

## 2024-01-09 NOTE — TELEPHONE ENCOUNTER
"Endoscopy Scheduling Screen    Have you had a positive Covid test in the last 14 days?  No    Are you active on MyChart?   Yes    What insurance is in the chart?  Other:  MEDICARE/[employer]     Ordering/Referring Provider:     ANGEL CERRATO       (If ordering provider performs procedure, schedule with ordering provider unless otherwise instructed. )    BMI: Estimated body mass index is 24.65 kg/m  as calculated from the following:    Height as of 12/27/23: 1.88 m (6' 2\").    Weight as of 12/27/23: 87.1 kg (192 lb).     Sedation Ordered  MAC/deep sedation.   BMI<= 45 45 < BMI <= 48 48 < BMI < = 50  BMI > 50   No Restrictions No [facility]  No [facility]  [facility] with exceptions Hospital Only OR Only       Are you taking any prescription medications for pain 3 or more times per week?   NO - No RN review required.    Do you have a history of malignant hyperthermia or adverse reaction to anesthesia?  No    (Females) Are you currently pregnant?   No     Have you been diagnosed or told you have pulmonary hypertension?   No    Do you have an LVAD?  No    Have you been told you have moderate to severe sleep apnea?  No    Have you been told you have COPD, asthma, or any other lung disease?  No    Do you have any heart conditions?  No     Have you ever had an organ transplant?   No    Have you ever had or are you awaiting a heart or lung transplant?   No    Have you had a stroke or transient ischemic attack (TIA aka \"mini stroke\" in the last 6 months?   No    Have you been diagnosed with or been told you have cirrhosis of the liver?   No    Are you currently on dialysis?   No    Do you need assistance transferring?   No    BMI: Estimated body mass index is 24.65 kg/m  as calculated from the following:    Height as of 12/27/23: 1.88 m (6' 2\").    Weight as of 12/27/23: 87.1 kg (192 lb).     Is patients BMI > 40 and scheduling location [facility]?  No    Do you take an injectable medication for weight loss or diabetes (excluding " insulin)?  No    Do you take the medication Naltrexone?  No    Do you take blood thinners?  No       Prep   Are you currently on dialysis or do you have chronic kidney disease?  No    Do you have a diagnosis of diabetes?  No    Do you have a diagnosis of cystic fibrosis (CF)?  No    On a regular basis do you go 3 -5 days between bowel movements?  Yes (Extended Prep)    BMI > 40?  No    [facility]    [address] [facility] [address]  Phone: [phone] Fax: [phone]      Final Scheduling Details   Colonoscopy prep sent?  Golytely Extended Prep    Procedure scheduled  Colonoscopy    Surgeon:  HARRIET      Date of procedure:  04/12/2024     Pre-OP / PAC:   No - Not required for this site.    Location  [facility] - Per order.    Sedation   MAC/Deep Sedation - Per order.      Patient Reminders:   You will receive a call from a Nurse to review instructions and health history.  This assessment must be completed prior to your procedure.  Failure to complete the Nurse assessment may result in the procedure being cancelled.      On the day of your procedure, please designate an adult(s) who can drive you home stay with you for the next 24 hours. The medicines used in the exam will make you sleepy. You will not be able to drive.      You cannot take public transportation, ride share services, or non-medical taxi service without a responsible caregiver.  Medical transport services are allowed with the requirement that a responsible caregiver will receive you at your destination.  We require that drivers and caregivers are confirmed prior to your procedure.

## 2024-01-11 ENCOUNTER — OFFICE VISIT (OUTPATIENT)
Dept: ORTHOPEDICS | Facility: CLINIC | Age: 64
End: 2024-01-11
Payer: MEDICARE

## 2024-01-11 ENCOUNTER — PRE VISIT (OUTPATIENT)
Dept: ORTHOPEDICS | Facility: CLINIC | Age: 64
End: 2024-01-11

## 2024-01-11 VITALS — HEIGHT: 74 IN | WEIGHT: 192 LBS | BODY MASS INDEX: 24.64 KG/M2

## 2024-01-11 DIAGNOSIS — M17.12 PRIMARY OSTEOARTHRITIS OF LEFT KNEE: Primary | ICD-10-CM

## 2024-01-11 PROCEDURE — 99204 OFFICE O/P NEW MOD 45 MIN: CPT | Performed by: ORTHOPAEDIC SURGERY

## 2024-01-11 ASSESSMENT — KOOS JR
RISING FROM SITTING: MODERATE
GOING UP OR DOWN STAIRS: SEVERE
STRAIGHTENING KNEE FULLY: MODERATE
HOW SEVERE IS YOUR KNEE STIFFNESS AFTER FIRST WAKING IN MORNING: SEVERE
TWISING OR PIVOTING ON KNEE: MODERATE
KOOS JR SCORING: 50.01
STANDING UPRIGHT: MILD
BENDING TO THE FLOOR TO PICK UP OBJECT: MODERATE

## 2024-01-11 NOTE — NURSING NOTE
"Reason For Visit:   Chief Complaint   Patient presents with    Consult     Left knee osteoarthritis referred by Dr. Villarreal // discuss total knee       Ht 1.88 m (6' 2\")   Wt 87.1 kg (192 lb)   BMI 24.65 kg/m      Pain Assessment  Patient Currently in Pain: Yes  0-10 Pain Scale: 3 (can get up to 10)  Primary Pain Location: Knee (left)      Hernan Quintero ATC    "

## 2024-01-11 NOTE — LETTER
1/11/2024         RE: Freeman Velazquez  [address]  [facility] [address]        Dear Colleague,    Thank you for referring your patient, Freeman Velazquez, to the [facility]. Please see a copy of my visit note below.    Assessment: This is a 63 year old with advanced left knee osteoarthritis associated with severe symptoms that interfere with activities of daily living despite comprehensive non-operative management. I do not believe that the patient will benefit meaningfully in the long-term from additional non-operative interventions such as intra-articular injections or physical therapy.   We discussed the diagnosis and the treatment options including living with it, additional non-operative management, and arthroplasty.  We discussed that given the level of symptoms and radiographic changes that further non-operative management in the form injection and/or physical therapist directed exercises is not mandatory. We discussed the risks and benefits of total knee arthroplasty at length and reviewed the proposed surgical plan.  The discussion included but was not limited to the following:  blood clots, blood clots to the lungs, infection, injury to blood vessels and nerves, stiffness, and continued pain. We discussed that as part of the routine part of surgical care a qualified assist may finish closing the wound after I have left the room. We discussed the post-operative recovery for the typical patient, return to driving, and return to normal activities.  All the patient's questions were answered to the best of my ability.      Plan:  left total knee when the patient chooses to go forward with it.     Chief Complaint: Consult (Left knee osteoarthritis referred by Dr. Villarreal // discuss total knee)    Physician:  No ref. provider found    HPI: Freeman Velazquez is a 63 year old male who presents today for evaluation of his left knee.     Symptom  Profile  Location of symptoms:  medial greater than lateral joint line   Onset: insidious  Trend: getting worse   Duration of symptoms:> 3 years   Quality of symptoms: aching, sharp/stabbing  Severity: severe  Alleviate: activity modification  Exacerbating: activities  Previous Treatments: Previous treatments include activity modification, oral pain medication (Alleve). Intra-articular injection of synvisc with several days of relief only. Worked with a physical therapist for > 3 months several years ago. History of ipsilateral knee arthroscopy.      MARK Jr 50.01  PROMIS Mental: (P) 16  PROMIS Physical (P) 15  PROMIS TOTAL (P) 31  [facility]: 7    MEDICAL HISTORY:   Past Medical History:   Diagnosis Date    Arthritis     Distal radius fracture 7/20/2011    GERD (gastroesophageal reflux disease)     Kidney stone     Renal colic        Medications:     Current Outpatient Medications:     Naproxen Sodium (ALEVE PO), Take by mouth as needed for moderate pain (Patient not taking: Reported on 12/12/2023), Disp: , Rfl:     tadalafil (ADCIRCA/CIALIS) 20 MG tablet, Take 1 tablet (20 mg) by mouth every 24 hours (Patient not taking: Reported on 12/12/2023), Disp: 12 tablet, Rfl: 11    Current Facility-Administered Medications:     lidocaine 1 % injection 5 mL, 5 mL, , , Darren Jordan MD, 5 mL at 08/17/20 1403    methylPREDNISolone (DEPO-MEDROL) injection 80 mg, 80 mg, , , Darren Jordan MD, 80 mg at 08/17/20 1403    Allergies: Tetracycline and Erythromycin    SURGICAL HISTORY:   Past Surgical History:   Procedure Laterality Date    ARTHROSCOPY KNEE BILATERAL      COLONOSCOPY  4/16/2013    Procedure: COLONOSCOPY;;  Surgeon: Lewis Metcalf MD;  Location: [facility]    COLONOSCOPY N/A 1/24/2019    Procedure: Combined Colonoscopy, Single Or Multiple Biopsy/Polypectomy By Biopsy;  Surgeon: Javier Judge MD;  Location: [facility]    COLONOSCOPY N/A 4/6/2021    Procedure: Colonoscopy, With Polypectomy And Biopsy;   Surgeon: Sasha Martinez DO;  Location: [facility]    COLONOSCOPY WITH CO2 INSUFFLATION N/A 1/24/2019    Procedure: COLONOSCOPY WITH CO2 INSUFFLATION;  Surgeon: Javier Judge MD;  [facility]    COLONOSCOPY WITH CO2 INSUFFLATION N/A 4/5/2021    Procedure: COLONOSCOPY, WITH CO2 INSUFFLATION;  Surgeon: Cedrick Sheikh DO;  Location: [facility]    COLONOSCOPY WITH CO2 INSUFFLATION N/A 4/6/2021    Procedure: COLONOSCOPY, WITH CO2 INSUFFLATION;  Surgeon: Sasha Martinez DO;  Location: [facility]    HC CYSTOSCOPY,INSERT URETERAL STENT Right 1/21/2020    Procedure: CYSTOSCOPY, WITH FLEXIBLE URETEROSCOPY, RETROGRADES, AND STENT INSERTION RIGHT;  Surgeon: Wil Alejo MD;  Location: [facility];  Service: Urology    KNEE SURGERY Bilateral        FAMILY HISTORY:   Family History   Problem Relation Age of Onset    Breast Cancer Mother     Allergies Mother     Anesthesia Reaction Mother     Arthritis Mother     Blood Disease Mother     Cardiovascular Father     Eye Disorder Father     Heart Disease Father     Cancer Father         skin    Macular Degeneration Father     Glaucoma Father     Diabetes Sister     Diabetes Maternal Grandmother     Thyroid Disease No family hx of     Cerebrovascular Disease No family hx of     Hypertension No family hx of     Cancer Mother         breast    Urolithiasis Father     Diabetes Sister     Gout No family hx of     Clotting Disorder No family hx of        SOCIAL HISTORY:   Social History     Tobacco Use    Smoking status: Never    Smokeless tobacco: Never   Substance Use Topics    Alcohol use: Yes     Comment: rare, one drink in 6 months   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  No fever, weight change or fatigue. No dry eyes. No oral ulcers, sore throat or voice change. No palpitations, syncope, angina or edema.  No chest pain, excessive  "sleepiness, shortness of breath or hemoptysis.   No abdominal pain, nausea, vomiting, diarrhea or heartburn.  No skin rash. No focal weakness or numbness. No bleeding or lymphadenopathy. No rhinitis or hives.     Exam:  On physical examination the patient appears the stated age, is in no acute distress, affect is appropriate, and breathing is non-labored.  Vitals are documented in the EMR and have been reviewed:    Ht 1.88 m (6' 2\")   Wt 87.1 kg (192 lb)   BMI 24.65 kg/m    6' 2\"  Body mass index is 24.65 kg/m .    Rises from chair: easily   Gait:normal   Gains the exam table:easily     Right  Knee  Appearance: benign  Clinical alignment: neutral   Effusion: no   Tenderness to palpation: no   Extension:0  Flexion: 125  Collateral ligaments: intact  Cruciate ligaments: grossly intact     Left Knee  Appearance: benign  Clinical alignment: mild varus   Effusion: small   Tenderness to palpation:medial more than lateral joint line   Extension: 5  Flexion: 105  Collateral ligaments: intact  Cruciate ligaments: grossly intact     Hip examination: benign hip ROM with groin or anterior thigh symptoms.     Distally, the circulatory, motor, and sensation exam is intact with 5/5 EHL, gastroc-soleus, and tibialis anterior.    Sensation to light touch is intact.    Dorsalis pedis and posterior tibialis pulses are palpable.    There are no sores on the feet, no bruising, and no lymphedema.    X-rays:   I reviewed the patient's radiographs and these show advanced, Kellgren grade 4 left knee osteoarthritis with complete loss of joint space in the medial compartment.         David Ramirez MD  "

## 2024-01-11 NOTE — PROGRESS NOTES
Assessment: This is a 63 year old with advanced left knee osteoarthritis associated with severe symptoms that interfere with activities of daily living despite comprehensive non-operative management. I do not believe that the patient will benefit meaningfully in the long-term from additional non-operative interventions such as intra-articular injections or physical therapy.   We discussed the diagnosis and the treatment options including living with it, additional non-operative management, and arthroplasty.  We discussed that given the level of symptoms and radiographic changes that further non-operative management in the form injection and/or physical therapist directed exercises is not mandatory. We discussed the risks and benefits of total knee arthroplasty at length and reviewed the proposed surgical plan.  The discussion included but was not limited to the following:  blood clots, blood clots to the lungs, infection, injury to blood vessels and nerves, stiffness, and continued pain. We discussed that as part of the routine part of surgical care a qualified assist may finish closing the wound after I have left the room. We discussed the post-operative recovery for the typical patient, return to driving, and return to normal activities.  All the patient's questions were answered to the best of my ability.      Plan:  left total knee when the patient chooses to go forward with it.     Chief Complaint: Consult (Left knee osteoarthritis referred by Dr. Villarreal // discuss total knee)    Physician:  No ref. provider found    HPI: Freeman Velazquez is a 63 year old male who presents today for evaluation of his left knee.     Symptom Profile  Location of symptoms:  medial greater than lateral joint line   Onset: insidious  Trend: getting worse   Duration of symptoms:> 3 years   Quality of symptoms: aching, sharp/stabbing  Severity: severe  Alleviate: activity modification  Exacerbating: activities  Previous Treatments:  Previous treatments include activity modification, oral pain medication (Alleve). Intra-articular injection of synvisc with several days of relief only. Worked with a physical therapist for > 3 months several years ago. History of ipsilateral knee arthroscopy.      MARK Baig 50.01  PROMIS Mental: (P) 16  PROMIS Physical (P) 15  PROMIS TOTAL (P) 31  [facility]: 7    MEDICAL HISTORY:   Past Medical History:   Diagnosis Date    Arthritis     Distal radius fracture 7/20/2011    GERD (gastroesophageal reflux disease)     Kidney stone     Renal colic        Medications:     Current Outpatient Medications:     Naproxen Sodium (ALEVE PO), Take by mouth as needed for moderate pain (Patient not taking: Reported on 12/12/2023), Disp: , Rfl:     tadalafil (ADCIRCA/CIALIS) 20 MG tablet, Take 1 tablet (20 mg) by mouth every 24 hours (Patient not taking: Reported on 12/12/2023), Disp: 12 tablet, Rfl: 11    Current Facility-Administered Medications:     lidocaine 1 % injection 5 mL, 5 mL, , , Darren Jordan MD, 5 mL at 08/17/20 1403    methylPREDNISolone (DEPO-MEDROL) injection 80 mg, 80 mg, , , Darren Jordan MD, 80 mg at 08/17/20 1403    Allergies: Tetracycline and Erythromycin    SURGICAL HISTORY:   Past Surgical History:   Procedure Laterality Date    ARTHROSCOPY KNEE BILATERAL      COLONOSCOPY  4/16/2013    Procedure: COLONOSCOPY;;  Surgeon: Lewis Metcalf MD;  [facility]    COLONOSCOPY N/A 1/24/2019    Procedure: Combined Colonoscopy, Single Or Multiple Biopsy/Polypectomy By Biopsy;  Surgeon: Javier Judge MD;  [facility]    COLONOSCOPY N/A 4/6/2021    Procedure: Colonoscopy, With Polypectomy And Biopsy;  Surgeon: Sasha Martinez DO;  [facility]    COLONOSCOPY WITH CO2 INSUFFLATION N/A 1/24/2019    Procedure: COLONOSCOPY WITH CO2 INSUFFLATION;  Surgeon: Javier Judge MD;  [facility]    COLONOSCOPY WITH CO2 INSUFFLATION N/A 4/5/2021    Procedure: COLONOSCOPY, WITH CO2  INSUFFLATION;  Surgeon: Cedrick Sheikh DO;  [facility]: [facility]    COLONOSCOPY WITH CO2 INSUFFLATION N/A 4/6/2021    Procedure: COLONOSCOPY, WITH CO2 INSUFFLATION;  Surgeon: Sasha Martinez DO;  Location: [facility]    HC CYSTOSCOPY,INSERT URETERAL STENT Right 1/21/2020    Procedure: CYSTOSCOPY, WITH FLEXIBLE URETEROSCOPY, RETROGRADES, AND STENT INSERTION RIGHT;  Surgeon: Wil Alejo MD;  Location: [facility] OR;  Service: Urology    KNEE SURGERY Bilateral        FAMILY HISTORY:   Family History   Problem Relation Age of Onset    Breast Cancer Mother     Allergies Mother     Anesthesia Reaction Mother     Arthritis Mother     Blood Disease Mother     Cardiovascular Father     Eye Disorder Father     Heart Disease Father     Cancer Father         skin    Macular Degeneration Father     Glaucoma Father     Diabetes Sister     Diabetes Maternal Grandmother     Thyroid Disease No family hx of     Cerebrovascular Disease No family hx of     Hypertension No family hx of     Cancer Mother         breast    Urolithiasis Father     Diabetes Sister     Gout No family hx of     Clotting Disorder No family hx of        SOCIAL HISTORY:   Social History     Tobacco Use    Smoking status: Never    Smokeless tobacco: Never   Substance Use Topics    Alcohol use: Yes     Comment: rare, one drink in 6 months   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  No fever, weight change or fatigue. No dry eyes. No oral ulcers, sore throat or voice change. No palpitations, syncope, angina or edema.  No chest pain, excessive sleepiness, shortness of breath or hemoptysis.   No abdominal pain, nausea, vomiting, diarrhea or heartburn.  No skin rash. No focal weakness or numbness. No bleeding or lymphadenopathy. No rhinitis or hives.     Exam:  On physical examination the patient appears the stated age, is in no acute  "distress, affect is appropriate, and breathing is non-labored.  Vitals are documented in the EMR and have been reviewed:    Ht 1.88 m (6' 2\")   Wt 87.1 kg (192 lb)   BMI 24.65 kg/m    6' 2\"  Body mass index is 24.65 kg/m .    Rises from chair: easily   Gait:normal   Gains the exam table:easily     Right  Knee  Appearance: benign  Clinical alignment: neutral   Effusion: no   Tenderness to palpation: no   Extension:0  Flexion: 125  Collateral ligaments: intact  Cruciate ligaments: grossly intact     Left Knee  Appearance: benign  Clinical alignment: mild varus   Effusion: small   Tenderness to palpation:medial more than lateral joint line   Extension: 5  Flexion: 105  Collateral ligaments: intact  Cruciate ligaments: grossly intact     Hip examination: benign hip ROM with groin or anterior thigh symptoms.     Distally, the circulatory, motor, and sensation exam is intact with 5/5 EHL, gastroc-soleus, and tibialis anterior.    Sensation to light touch is intact.    Dorsalis pedis and posterior tibialis pulses are palpable.    There are no sores on the feet, no bruising, and no lymphedema.    X-rays:   I reviewed the patient's radiographs and these show advanced, Kellgren grade 4 left knee osteoarthritis with complete loss of joint space in the medial compartment.       "

## 2024-01-12 ENCOUNTER — TELEPHONE (OUTPATIENT)
Dept: ORTHOPEDICS | Facility: CLINIC | Age: 64
End: 2024-01-12
Payer: MEDICARE

## 2024-01-12 NOTE — TELEPHONE ENCOUNTER
Spoke with the patient about getting his surgery scheduled with Dr. Ramirez. He is not interested in scheduling at this time as he has a colon procedure scheduled in April and would like to wait until after that. He is thinking he would do the surgery in Fall, but he will call back in early Summer to schedule.     Ariadna Mallory  Surgery Scheduling Coordinator  Ph: [phone]

## 2024-03-14 ENCOUNTER — OFFICE VISIT (OUTPATIENT)
Dept: DERMATOLOGY | Facility: CLINIC | Age: 64
End: 2024-03-14
Payer: MEDICARE

## 2024-03-14 DIAGNOSIS — L57.0 ACTINIC KERATOSIS: ICD-10-CM

## 2024-03-14 DIAGNOSIS — Z80.8 FAMILY HISTORY OF MELANOMA: Primary | ICD-10-CM

## 2024-03-14 DIAGNOSIS — D22.9 NEVUS: ICD-10-CM

## 2024-03-14 DIAGNOSIS — L82.1 SEBORRHEIC KERATOSIS: ICD-10-CM

## 2024-03-14 DIAGNOSIS — L72.9 CYST OF SKIN: ICD-10-CM

## 2024-03-14 DIAGNOSIS — D18.01 ANGIOMA OF SKIN: ICD-10-CM

## 2024-03-14 DIAGNOSIS — L81.4 LENTIGO: ICD-10-CM

## 2024-03-14 PROCEDURE — 17003 DESTRUCT PREMALG LES 2-14: CPT | Performed by: PHYSICIAN ASSISTANT

## 2024-03-14 PROCEDURE — 99203 OFFICE O/P NEW LOW 30 MIN: CPT | Mod: 25 | Performed by: PHYSICIAN ASSISTANT

## 2024-03-14 PROCEDURE — 17000 DESTRUCT PREMALG LESION: CPT | Performed by: PHYSICIAN ASSISTANT

## 2024-03-14 ASSESSMENT — PAIN SCALES - GENERAL: PAINLEVEL: NO PAIN (0)

## 2024-03-14 NOTE — PROGRESS NOTES
HPI:   Chief complaints: Freeman Velazquez is a pleasant 64 year old male who presents for Full skin cancer screening to rule out skin cancer   Last Skin Exam: few years ago      1st Baseline: no  Personal HX of Skin Cancer: no   Personal HX of Malignant Melanoma: no   Family HX of Skin Cancer / Malignant Melanoma: Yes father with a history of melanoma; numerous family members with a history of melanoma  Personal HX of Atypical Moles:   no  Risk factors: history of sun exposure and burns; fhx of melanoma  New / Changing lesions:yes spot in the perianal area which is new  Social History:   On review of systems, there are no further skin complaints, patient is feeling otherwise well.   ROS of the following were done and are negative: Constitutional, Eyes, Ears, Nose,   Mouth, Throat, Cardiovascular, Respiratory, GI, Genitourinary, Musculoskeletal,   Psychiatric, Endocrine, Allergic/Immunologic.    PHYSICAL EXAM:   There were no vitals taken for this visit.  Skin exam performed as follows: Type 2 skin. Mood appropriate  Alert and Oriented X 3. Well developed, well nourished in no distress.  General appearance: Normal  Head including face: Normal  Eyes: conjunctiva and lids: Normal  Mouth: Lips, teeth, gums: Normal  Neck: Normal  Chest-breast/axillae: Normal  Back: Normal  Extremities: digits/nails (clubbing): Normal  Eccrine and Apocrine glands: Normal  Right upper extremity: Normal  Left upper extremity: Normal  Right lower extremity: Normal  Left lower extremity: Normal  Skin: Scalp and body hair: See below    Pt deferred exam of breasts, groin, buttocks: No    Other physical findings:  1. Multiple pigmented macules on extremities and trunk  2. Multiple pigmented macules on face, trunk and extremities  3. Multiple vascular papules on trunk, arms and legs  4. Multiple scattered keratotic plaques  5. Pink gritty papule on the right temple x 1, right inferior orbit x 1  6. White papule on the left perianal area        Except as noted above, no other signs of skin cancer or melanoma.     ASSESSMENT/PLAN:   Benign Full skin cancer screening today. . Patient with history of none; fhx of melanoma in father; numerous family members   Advised on monthly self exams and 1 year  Patient Education: Appropriate brochures given.    Multiple benign appearing melanocytic nevi on arms, legs and trunk. Discussed ABCDEs of melanoma and sunscreen.   Multiple lentigos on arms, legs and trunk. Advised benign, no treatment needed.  Multiple scattered angiomas. Advised benign, no treatment needed.   Seborrheic keratosis on arms, legs and trunk. Advised benign, no treatment needed.  Actinic keratosis on the right temple x 1, right inferior orbit x 1. As precancerous, cryosurgery performed. Advised on blistering and post-op care. Advised if not resolved in 1-2 months to return for evaluation  Cyst on the left perianal area - advised benign no treatment needed.           Follow-up: yearly/PRN sooner    1.) Patient was asked about new and changing moles. YES  2.) Patient received a complete physical skin examination: YES  3.) Patient was counseled to perform a monthly self skin examination: YES  Scribed By: Nora Myers, MS, PA-C

## 2024-03-14 NOTE — LETTER
3/14/2024         RE: Freeman Velazquez  [address]  [facility] [id number]        Dear Colleague,    Thank you for referring your patient, Freeman Velazquez, to the [facility]. Please see a copy of my visit note below.    HPI:   Chief complaints: Freeman Velazquez is a pleasant 64 year old male who presents for Full skin cancer screening to rule out skin cancer   Last Skin Exam: few years ago      1st Baseline: no  Personal HX of Skin Cancer: no   Personal HX of Malignant Melanoma: no   Family HX of Skin Cancer / Malignant Melanoma: Yes father with a history of melanoma; numerous family members with a history of melanoma  Personal HX of Atypical Moles:   no  Risk factors: history of sun exposure and burns; fhx of melanoma  New / Changing lesions:yes spot in the perianal area which is new  Social History:   On review of systems, there are no further skin complaints, patient is feeling otherwise well.   ROS of the following were done and are negative: Constitutional, Eyes, Ears, Nose,   Mouth, Throat, Cardiovascular, Respiratory, GI, Genitourinary, Musculoskeletal,   Psychiatric, Endocrine, Allergic/Immunologic.    PHYSICAL EXAM:   There were no vitals taken for this visit.  Skin exam performed as follows: Type 2 skin. Mood appropriate  Alert and Oriented X 3. Well developed, well nourished in no distress.  General appearance: Normal  Head including face: Normal  Eyes: conjunctiva and lids: Normal  Mouth: Lips, teeth, gums: Normal  Neck: Normal  Chest-breast/axillae: Normal  Back: Normal  Extremities: digits/nails (clubbing): Normal  Eccrine and Apocrine glands: Normal  Right upper extremity: Normal  Left upper extremity: Normal  Right lower extremity: Normal  Left lower extremity: Normal  Skin: Scalp and body hair: See below    Pt deferred exam of breasts, groin, buttocks: No    Other physical findings:  1. Multiple pigmented macules on extremities and trunk  2. Multiple  pigmented macules on face, trunk and extremities  3. Multiple vascular papules on trunk, arms and legs  4. Multiple scattered keratotic plaques  5. Pink gritty papule on the right temple x 1, right inferior orbit x 1  6. White papule on the left perianal area       Except as noted above, no other signs of skin cancer or melanoma.     ASSESSMENT/PLAN:   Benign Full skin cancer screening today. . Patient with history of none; fhx of melanoma in father; numerous family members   Advised on monthly self exams and 1 year  Patient Education: Appropriate brochures given.    Multiple benign appearing melanocytic nevi on arms, legs and trunk. Discussed ABCDEs of melanoma and sunscreen.   Multiple lentigos on arms, legs and trunk. Advised benign, no treatment needed.  Multiple scattered angiomas. Advised benign, no treatment needed.   Seborrheic keratosis on arms, legs and trunk. Advised benign, no treatment needed.  Actinic keratosis on the right temple x 1, right inferior orbit x 1. As precancerous, cryosurgery performed. Advised on blistering and post-op care. Advised if not resolved in 1-2 months to return for evaluation  Cyst on the left perianal area - advised benign no treatment needed.           Follow-up: yearly/PRN sooner    1.) Patient was asked about new and changing moles. YES  2.) Patient received a complete physical skin examination: YES  3.) Patient was counseled to perform a monthly self skin examination: YES  Scribed By: Nora Myers, MS, PAANASTASIA      Again, thank you for allowing me to participate in the care of your patient.        Sincerely,        Nora Myers PA-C

## 2024-03-18 NOTE — TELEPHONE ENCOUNTER
Incoming call from patient stating that they will not be able to tolerate Extended Golytely prep. Writer advised double miralax prep as an alternative prep due to constipation noted. Patient states they did not do an extended prep last time and would like to do what they have done in the past. Discussed reasoning as to why an extended prep would be recommended if patient is having procedure due to change in caliber and noted suspected constipation in office visit with gastro provider.    Writer will send updated double miralax prep instructions to patient via [employer]. Informed patient that pre assessment team will call about 2 weeks in advance to review instructions and appointment. Patient had no further questions at this time.     Deb Liz RN  Endoscopy Procedure Pre Assessment RN  [phone] option 4

## 2024-03-29 ENCOUNTER — TELEPHONE (OUTPATIENT)
Dept: GASTROENTEROLOGY | Facility: CLINIC | Age: 64
End: 2024-03-29
Payer: MEDICARE

## 2024-03-29 NOTE — TELEPHONE ENCOUNTER
Pre visit planning completed.      Procedure details:    Patient scheduled for Colonoscopy  on 4/12/24.     Arrival time: 1100. Procedure time 1200    Facility location: [facility]; [address] [place] [address], [address] Floor, [place] [address]. Check in location: 5th Floor.    Sedation type: MAC    Pre op exam needed? N/A    Indication for procedure:   Change in stool caliber         Chart review:     Electronic implanted devices? No    Recent diagnosis of diverticulitis within the last 6 weeks? No    Diabetic? No      Medication review:    Anticoagulants? No    NSAIDS? Yes.  Naproxen (Naprosyn, Aleve).  Holding interval of 4 days.    Other medication HOLDING recommendations:  N/A      Prep for procedure:     Bowel prep recommendation: Double Miralax   Due to: constipation noted or reported. , patient request/preference. , and poor prep/inadequate bowel prep in the past.     Prep instructions sent via [employer]         Deb Hawkins RN  Endoscopy Procedure Pre Assessment RN  [phone] option 4

## 2024-03-29 NOTE — TELEPHONE ENCOUNTER
Attempted to contact patient in order to complete pre assessment questions.     No answer. Left message to return call to [phone] option 4    Callback required communication sent via [employer].      Deb Liz RN  Endoscopy Procedure Pre Assessment RN   89yo F with PMHx HTN p/w left hip pain and scalp lacerations after sustaining mechanical fall.       L HIP FRACTURE (UNOFFICIALLY DX PENDING OFFICIAL RADIOLOGY READ)  --  pain control: morphine iv prn for severe pain;  tylenol for mild pain  --  dvt ppx, fall risk precautions  --  activity: bedrest / advance as tolerated  --  f/u official radiology readings  --  check tte (unknown baseline heart function,  no previous known cardiac history,  loud systolic murmur on exam, but states her baseline functional status is asymptomatic on exertion and can go up /down flight of stairs daily without any sob)  --  ortho eval >> wants pt to be npo for now,  will come by to see later.   --  ptrehab eval        HTN  --  call [facility] pharmacy in am to reconcile bp medication (pt states she takes only 1 medication)        CHG 4% bath daily and prn  DVT PPX

## 2024-04-03 NOTE — TELEPHONE ENCOUNTER
Pre assessment completed for upcoming procedure.   (Please see previous telephone encounter notes for complete details)      Procedure details:    Arrival time and facility location reviewed.    Pre op exam needed? N/A    Designated  policy reviewed. Instructed to have someone stay 24 hours post procedure.       Medication review:    Medications reviewed. Please see supporting documentation below. Holding recommendations discussed (if applicable).       Prep for procedure:     Procedure prep instructions reviewed.        Any additional information needed:  N/A      Patient  verbalized understanding and had no questions or concerns at this time.      Deb Liz RN  Endoscopy Procedure Pre Assessment RN  [phone] option 4

## 2024-04-05 ENCOUNTER — VIRTUAL VISIT (OUTPATIENT)
Dept: GASTROENTEROLOGY | Facility: CLINIC | Age: 64
End: 2024-04-05
Attending: PHYSICIAN ASSISTANT
Payer: MEDICARE

## 2024-04-05 DIAGNOSIS — R10.32 LLQ ABDOMINAL PAIN: ICD-10-CM

## 2024-04-05 DIAGNOSIS — Z87.898 HISTORY OF DYSPHAGIA: ICD-10-CM

## 2024-04-05 DIAGNOSIS — R19.5 CHANGE IN STOOL CALIBER: Primary | ICD-10-CM

## 2024-04-05 PROCEDURE — 99213 OFFICE O/P EST LOW 20 MIN: CPT | Mod: 95 | Performed by: PHYSICIAN ASSISTANT

## 2024-04-05 NOTE — PROGRESS NOTES
GI CLINIC VISIT    Virtual Visit Details    Type of service:  Video Visit   Video Start Time: 10:16 AM  Video End Time:10:30 AM    Originating Location (pt. Location): Home    Distant Location (provider location):  Off-site  Platform used for Video Visit: Hugo FRIAS/REFERRING MD:  Jagdish Hines  REASON FOR CONSULTATION: LLQ abdominal pain, change in stool caliber    ASSESSMENT/PLAN:  65 y/o M presents for follow up of change in stool caliber.    #change in stool caliber  #LLQ abdominal pain  Patient reports that he has had increased frequency of BM with change in diet and increasing his fluid intake, but still having a BM once every 2-3 days which can be harder in nature. I did recommend that he start taking miralax 1-3 capfuls daily in addition to a fiber supplementation (as he does have a hx of diverticulosis). He is agreeable to try this. He does have a c-scope scheduled on 4/12/24, so we will follow up the results of that. He continues to have intermittent LLQ abdominal pain that I suspect is likely due to constipation. If he does not have improvement in constipation with miralax and fiber, could consider use of linzess.   --start miralax and fiber daily  --review constipation plan.   --f/up for c-scope on 4/12/24  -- if no improvement in constipation, can consider use of linzess and a referral to the pelvic floor center.    #Hx of dysphagia  Patient reports a hx of dysphagia in the past, but has not felt this to be bothersome recently, he reports previous hx of requiring dilation. He did have an EGD in 2013, which showed a Schatzki ring and this is when dilation occurred. He would not like to repeat EGD at this time.      Colorectal cancer screening: will have c-scope on 4/12/24    RTC 6 months    Thank you for this consultation.  It was a pleasure to participate in the care of this patient; please contact us with any further questions.       20 minutes spent on the date of the encounter doing chart review,  "review of test results, patient visit, and documentation    This note was created with voice recognition software, and while reviewed for accuracy, typos may remain.    Anil Rojas PA-C  Division of Gastroenterology, Hepatology and Nutrition  [facility]  65 y/o M presents for follow up of change in stool caliber.    Patient was initially evaluated on 12/27/23, please see visit details below:     Patient reports that a few months ago, he developed LLQ abdominal pain -- he notes pain is intermittent in nature, he is unable to describe the pain. He cannot identify any specific triggers for his pain, or relieving factors, does not feel pain is better with having a BM.     Patient has also noticed change in bowel habits - he has noticed that stool is very \"thin\" in nature, which has been ongoing for several months.  Patient has had two bouts of diarrhea in the last 6 months, described as bristol type 6.   Normally patient would have one bowel movement daily, described as \"normal thickness\", described as a bristol type 4. Reports on occasion has had to push/strain to have a BM. Denies BRBPR. He feels that recently stools have been darker brown in color.     He does report a hx of reflux - he does not take anything for this. He has made some lifestyle modifications, so he does not eat close to bedtime, and does sleep with HOB elevated. He feels that symptoms are fairly well controlled.     Takes NSAIDs usually twice a week.  Denies Tylenol. Denies use of OTC herbal supplements/weight loss products.      Drinks 1 alcoholic drink every few weeks. Denies tobacco products. No recreational drug use.     No family history of GI related malignancy (esophageal, gastric, pancreatic, liver or colon) or family history of IBD/celiac disease.     4/5/24  Patient had a CT abd/pelvis done in January 2024 which showed bilateral thickening of the left side of the lower rectum, " colonoscopy ordered to evaluate further, currently scheduled for 4/12/24.   AXR was concerning for severe constipation, so recommended trial of miralax and fiber.    Patient reports constipation has improved - he will now generally have a BM, once every 2-3 days. Describes stool as hard but formed, described as a bristol type 3. Did have a few instances of diarrhea a few weeks ago. He does have to push/strain at times, but this has improved. He will take miralax 1 capful, ever few days or so, but has not taken consistently. He has not tried a fiber supplement. He will have LLQ abdominal pain that is intermittent in nature.       ROS:    No fevers or chills  No weight loss  No shortness of breath or wheezing  No chest pain or pressure  No odynophagia or dysphagia -- reports he has issues with dysphagia in the past, requiring dilation, but now it is not so bothersome and he makes sure to chew his food well  No BRBPR, hematochezia, melena  No anxiety or depression      PROBLEM LIST  Patient Active Problem List    Diagnosis Date Noted    Osteoarthritis of both knees, unspecified osteoarthritis type 08/26/2020     Priority: Medium    Renal colic 03/10/2020     Priority: Medium    Calculus of ureter 03/10/2020     Priority: Medium     Formatting of this note might be different from the original. Added automatically from request for surgery [id number]      Calculus of kidney 01/14/2020     Priority: Medium    Gastroesophageal reflux disease with esophagitis 01/04/2018     Priority: Medium    Family history of malignant melanoma of skin 09/19/2017     Priority: Medium    ACP (advance care planning) 03/09/2016     Priority: Medium     Advance Care Planning 3/9/2016: ACP Review of Chart / Resources Provided:  Reviewed chart for advance care plan.  Freeman Velazquez has no plan or code status on file. Discussed available resources and provided with information. Confirmed code status reflects current choices pending further ACP  discussions.  Confirmed/documented legally designated decision makers.  Added by Jennifer Platt            Family history of malignant neoplasm of prostate 01/19/2012     Priority: Medium       PERTINENT PAST MEDICAL HISTORY:    Past Medical History:   Diagnosis Date    Arthritis     Distal radius fracture 7/20/2011    GERD (gastroesophageal reflux disease)     Kidney stone     Renal colic        PREVIOUS SURGERIES:    Past Surgical History:   Procedure Laterality Date    ARTHROSCOPY KNEE BILATERAL      COLONOSCOPY  4/16/2013    Procedure: COLONOSCOPY;;  Surgeon: Lewis Metcalf MD;  Location: [facility] OR    COLONOSCOPY N/A 1/24/2019    Procedure: Combined Colonoscopy, Single Or Multiple Biopsy/Polypectomy By Biopsy;  Surgeon: Javier Judge MD;  Location: [facility] OR    COLONOSCOPY N/A 4/6/2021    Procedure: Colonoscopy, With Polypectomy And Biopsy;  Surgeon: Sasha Martinez DO;  Location: [facility] OR    COLONOSCOPY WITH CO2 INSUFFLATION N/A 1/24/2019    Procedure: COLONOSCOPY WITH CO2 INSUFFLATION;  Surgeon: Javier Judge MD;  Location: [facility] OR    COLONOSCOPY WITH CO2 INSUFFLATION N/A 4/5/2021    Procedure: COLONOSCOPY, WITH CO2 INSUFFLATION;  Surgeon: Cedrick Sheikh DO;  Location: [facility] OR    COLONOSCOPY WITH CO2 INSUFFLATION N/A 4/6/2021    Procedure: COLONOSCOPY, WITH CO2 INSUFFLATION;  Surgeon: Sasha Martinez DO;  Location: [facility] OR    HC CYSTOSCOPY,INSERT URETERAL STENT Right 1/21/2020    Procedure: CYSTOSCOPY, WITH FLEXIBLE URETEROSCOPY, RETROGRADES, AND STENT INSERTION RIGHT;  Surgeon: Wil Alejo MD;  Location: [facility];  Service: Urology    KNEE SURGERY Bilateral        PREVIOUS ENDOSCOPY:  Colonoscopy (4/6/21): - One 3 mm polyp at the hepatic flexure, removed                             with a cold biopsy forceps. Resected and retrieved.                             - The examined portion of the ileum was normal.   FINAL DIAGNOSIS:   HEPATIC FLEXURE POLYP, POLYPECTOMY:   -  Tubular adenoma   - Negative for high-grade dysplasia     Repeat in 5 years.    ALLERGIES:     Allergies   Allergen Reactions    Tetracycline Shortness Of Breath    Erythromycin        PERTINENT MEDICATIONS:    Current Outpatient Medications:     Naproxen Sodium (ALEVE PO), Take by mouth as needed for moderate pain (Patient not taking: Reported on 12/12/2023), Disp: , Rfl:     tadalafil (ADCIRCA/CIALIS) 20 MG tablet, Take 1 tablet (20 mg) by mouth every 24 hours (Patient not taking: Reported on 12/12/2023), Disp: 12 tablet, Rfl: 11    Current Facility-Administered Medications:     lidocaine 1 % injection 5 mL, 5 mL, , , Darren Jordan MD, 5 mL at 08/17/20 1403    methylPREDNISolone (DEPO-MEDROL) injection 80 mg, 80 mg, , , Darren Jordan MD, 80 mg at 08/17/20 1403    SOCIAL HISTORY:    Social History     Socioeconomic History    Marital status:      Spouse name: Not on file    Number of children: Not on file    Years of education: Not on file    Highest education level: Not on file   Occupational History     Employer: [employer]   Tobacco Use    Smoking status: Never    Smokeless tobacco: Never   Vaping Use    Vaping Use: Never used   Substance and Sexual Activity    Alcohol use: Yes     Comment: rare, one drink in 6 months    Drug use: No    Sexual activity: Not Currently     Partners: Female     Comment: many years   Other Topics Concern    Parent/sibling w/ CABG, MI or angioplasty before 65F 55M? No   Social History Narrative    Not on file     Social Determinants of Health     Financial Resource Strain: Low Risk  (12/8/2023)    Financial Resource Strain     Within the past 12 months, have you or your family members you live with been unable to get utilities (heat, electricity) when it was really needed?: No   Food Insecurity: Low Risk  (12/8/2023)    Food Insecurity     Within the past 12 months, did you worry that your food would run out before you got money to buy more?:  No     Within the past 12 months, did the food you bought just not last and you didn t have money to get more?: No   Transportation Needs: Low Risk  (12/8/2023)    Transportation Needs     Within the past 12 months, has lack of transportation kept you from medical appointments, getting your medicines, non-medical meetings or appointments, work, or from getting things that you need?: No   Physical Activity: Not on file   Stress: Not on file   Social Connections: Not on file   Interpersonal Safety: Low Risk  (12/8/2023)    Interpersonal Safety     Do you feel physically and emotionally safe where you currently live?: Yes     Within the past 12 months, have you been hit, slapped, kicked or otherwise physically hurt by someone?: No     Within the past 12 months, have you been humiliated or emotionally abused in other ways by your partner or ex-partner?: No   Housing Stability: Low Risk  (12/8/2023)    Housing Stability     Do you have housing? : Yes     Are you worried about losing your housing?: No       FAMILY HISTORY:  FH of CRC: None.  FH of IBD: None.  Family History   Problem Relation Age of Onset    Breast Cancer Mother     Allergies Mother     Anesthesia Reaction Mother     Arthritis Mother     Blood Disease Mother     Cancer Mother         breast    Cardiovascular Father     Eye Disorder Father     Heart Disease Father     Cancer Father         skin    Macular Degeneration Father     Glaucoma Father     Urolithiasis Father     Melanoma Father     Diabetes Sister     Diabetes Sister     Diabetes Maternal Grandmother     Skin Cancer Paternal Uncle     Thyroid Disease No family hx of     Cerebrovascular Disease No family hx of     Hypertension No family hx of     Gout No family hx of     Clotting Disorder No family hx of        Past/family/social history reviewed and no changes    PHYSICAL EXAMINATION:  General: Patient appears well in no acute distress.   Skin: No visualized rash or lesions on visualized  skin  Eyes: EOMI, no erythema, sclera icterus or discharge noted  Resp: Appears to be breathing comfortably without accessory muscle usage, speaking in full sentences, no cough  MSK: Appears to have normal range of motion based on visualized movements  Neurologic: No apparent tremors, facial movements symmetric  Psych: normal affect , alert and oriented

## 2024-04-05 NOTE — PATIENT INSTRUCTIONS
It was a pleasure taking care of you today.  I've included a brief summary of our discussion and care plan from today's visit below.  Please review this information with your primary care provider.  ______________________________________________________________________    My recommendations are summarized as follows:  Goal: Improvement in stool frequency to reduce occurrence of straining, bloating, and abdominal discomfort.    Daily constipation plan:  -- Miralax: Miralax 1 capful daily, titrating to goal, up to 3 capfuls daily. If you have not had a bowel movement for 2-3 days, or if you feel that you straining, incompletely evacuating, plan to augment your daily plan by increasing Miralax dose, up to 3 capfuls daily, until stooling as resumed, and then return to previous dosing.  -- Increase dietary fiber as able to tolerate, with goal 20-30 grams daily. Prunes can be a great source of fiber and can be used daily to help with bowel movements. If certain foods are difficult for you to tolerate, consider meeting with our nutrition team to help you.  -- Fiber: After using Miralax for about 2 weeks, you can add in soluble fiber supplement (such as Citrucel, Metamucil, psyllium husk). Start with 1/2 tablespoon in 8 oz water daily, increase slowly to effect. A good goal is 1 tablespoon daily, with a maximum of 3 tablespoons daily. This helps with stool consistency.  - Try to stay active with at least 150 minutes of moderate intensity activity per week. This can include brisk walking, active gardening, cycling, yoga, pilates, etc.   - Try to stay hydrated, goal 48-64 oz of non-caffeinated fluid daily    Constipation back-up plan: If you have not had a bowel movement for 2-3 days, or if you feel that you straining, incompletely evacuating, plan to augment your daily plan by:  - Miralax: Increasing Miralax to up to 3 capfuls daily, returning to daily maintenance dose above, once stools have resumed  - Magnesium:  Add in  magnesium oxide 400 mg once to twice daily  - Products containing senna (ie Senakot, SmoothMove tea) can be used over a short period of time, but please be mindful that your bowels can develop a dependence to senna products, meaning your bowels will rely on Senna to produce a bowel movement.    Emergency back-up plan: If you continue to feel constipated and need more help:  -- If it has been 3 days without a BM, consider taking 1 bottle of magnesium citrate and/or 1 glycerin suppository (insert rounded end, not pointed end, first)  - If this is not effective, please call us.      -- please see scheduling information provided below     Return to GI Clinic in 6 months to review your progress.    ______________________________________________________________________    How do I schedule labs, imaging studies, or procedures that were ordered in clinic today?     Labs: To schedule lab appointment at the [facility], use my chart or call [phone]. If you have a [place] lab closer to home where you are regularly seen you can give them a call.     Procedures: If a colonoscopy, upper endoscopy, breath test, esophageal manometry, or pH impedence was ordered today, our endoscopy team will call you to schedule this. If you have not heard from our endoscopy team within a week, please call [phone] to schedule.     Imaging Studies: If you were scheduled for a CT scan, X-ray, MRI, ultrasound, HIDA scan or other imaging study, please call [phone] to have this scheduled.     Referral: If a referral to another specialty was ordered, expect a phone call or follow instructions above. If you have not heard from anyone regarding your referral in a week, please call our clinic to check the status.     Who do I call with any questions after my visit?  Please be in touch if there are any further questions that arise following today's visit.  There are multiple ways to contact  your gastroenterology care team.      During business hours, you may reach a Gastroenterology nurse at [phone]    To schedule or reschedule an appointment, please call [phone].     You can always send a secure message through Runscope.  Runscope messages are answered by your nurse or doctor typically within 24 hours.  Please allow extra time on weekends and holidays.      For urgent/emergent questions after business hours, you may reach the on-call GI Fellow by contacting the [facility] at [phone].     How will I get the results of any tests ordered?    You will receive all of your results.  If you have signed up for YoBuckot, any tests ordered at your visit will be available to you after your provider reviews them.  Typically this takes 1-2 weeks.  If there are urgent results that require a change in your care plan, your provider or nurse will call you to discuss the next steps.      What is Runscope?  Runscope is a secure way for you to access all of your healthcare records from the [facility].  It is a web based computer program, so you can sign on to it from any location.  It also allows you to send secure messages to your care team.  I recommend signing up for Runscope access if you have not already done so and are comfortable with using a computer.      How to I schedule a follow-up visit?  If you did not schedule a follow-up visit today, please call [phone] to schedule a follow-up office visit.      Sincerely,    Anil Rojas PA-C  Division of Gastroenterology, Hepatology & Nutrition  [facility]

## 2024-04-05 NOTE — NURSING NOTE
Is the patient currently in the state of [place]? YES    Visit mode:VIDEO    If the visit is dropped, the patient can be reconnected by: VIDEO VISIT: Text to cell phone:   Telephone Information:   Mobile [phone]       Will anyone else be joining the visit? NO  (If patient encounters technical issues they should call [phone]    How would you like to obtain your AVS? MyChart    Are changes needed to the allergy or medication list? No    Are refills needed on medications prescribed by this physician?     Reason for visit: RECHECK    Oscar BARR

## 2024-04-11 NOTE — TELEPHONE ENCOUNTER
Patient called in regarding upcoming colonoscopy procedure scheduled for tomorrow 4/12/24.    Patient wanting to know if they can take an ondansetron prior to starting the bowel prep to help with nausea.  Advised patient it is OK to take ondansetron if they already have a prescription for it. Patient stated they do have some on hand and are planning to take ondansetron just prior to starting the bowel prep solution.     Patient verbalized understanding and had no further questions.      Angie Quintero RN  Endoscopy Procedure Pre-Assessment RN  [phone], option 4

## 2024-04-12 ENCOUNTER — ANESTHESIA EVENT (OUTPATIENT)
Dept: SURGERY | Facility: AMBULATORY SURGERY CENTER | Age: 64
End: 2024-04-12
Payer: MEDICARE

## 2024-04-12 ENCOUNTER — PATIENT OUTREACH (OUTPATIENT)
Dept: GASTROENTEROLOGY | Facility: CLINIC | Age: 64
End: 2024-04-12

## 2024-04-12 ENCOUNTER — ANESTHESIA (OUTPATIENT)
Dept: SURGERY | Facility: AMBULATORY SURGERY CENTER | Age: 64
End: 2024-04-12
Payer: MEDICARE

## 2024-04-12 ENCOUNTER — HOSPITAL ENCOUNTER (OUTPATIENT)
Facility: AMBULATORY SURGERY CENTER | Age: 64
Discharge: HOME | End: 2024-04-12
Attending: SURGERY | Admitting: SURGERY
Payer: MEDICARE

## 2024-04-12 VITALS
DIASTOLIC BLOOD PRESSURE: 60 MMHG | TEMPERATURE: 97 F | RESPIRATION RATE: 16 BRPM | HEART RATE: 68 BPM | HEIGHT: 74 IN | OXYGEN SATURATION: 98 % | SYSTOLIC BLOOD PRESSURE: 125 MMHG | BODY MASS INDEX: 24.38 KG/M2 | WEIGHT: 190 LBS

## 2024-04-12 VITALS — HEART RATE: 68 BPM

## 2024-04-12 LAB — COLONOSCOPY: NORMAL

## 2024-04-12 PROCEDURE — 45378 DIAGNOSTIC COLONOSCOPY: CPT | Performed by: ANESTHESIOLOGY

## 2024-04-12 PROCEDURE — G0121 COLON CA SCRN NOT HI RSK IND: HCPCS | Performed by: SURGERY

## 2024-04-12 PROCEDURE — 45378 DIAGNOSTIC COLONOSCOPY: CPT | Performed by: NURSE ANESTHETIST, CERTIFIED REGISTERED

## 2024-04-12 PROCEDURE — G0121 COLON CA SCRN NOT HI RSK IND: HCPCS

## 2024-04-12 RX ORDER — LIDOCAINE HYDROCHLORIDE 20 MG/ML
INJECTION, SOLUTION INFILTRATION; PERINEURAL PRN
Status: DISCONTINUED | OUTPATIENT
Start: 2024-04-12 | End: 2024-04-12

## 2024-04-12 RX ORDER — SODIUM CHLORIDE, SODIUM LACTATE, POTASSIUM CHLORIDE, CALCIUM CHLORIDE 600; 310; 30; 20 MG/100ML; MG/100ML; MG/100ML; MG/100ML
INJECTION, SOLUTION INTRAVENOUS CONTINUOUS
Status: DISCONTINUED | OUTPATIENT
Start: 2024-04-12 | End: 2024-04-13 | Stop reason: HOSPADM

## 2024-04-12 RX ORDER — PROPOFOL 10 MG/ML
INJECTION, EMULSION INTRAVENOUS CONTINUOUS PRN
Status: DISCONTINUED | OUTPATIENT
Start: 2024-04-12 | End: 2024-04-12

## 2024-04-12 RX ADMIN — LIDOCAINE HYDROCHLORIDE 60 MG: 20 INJECTION, SOLUTION INFILTRATION; PERINEURAL at 12:24

## 2024-04-12 RX ADMIN — PROPOFOL 350 MCG/KG/MIN: 10 INJECTION, EMULSION INTRAVENOUS at 12:24

## 2024-04-12 RX ADMIN — SODIUM CHLORIDE, SODIUM LACTATE, POTASSIUM CHLORIDE, CALCIUM CHLORIDE: 600; 310; 30; 20 INJECTION, SOLUTION INTRAVENOUS at 11:40

## 2024-04-12 NOTE — ANESTHESIA PREPROCEDURE EVALUATION
Anesthesia Pre-Procedure Evaluation    Patient: Freeman Velazquez   MRN: [id number] : 1960        Procedure : Procedure(s):  Colonoscopy          Past Medical History:   Diagnosis Date    Arthritis     Distal radius fracture 2011    GERD (gastroesophageal reflux disease)     Kidney stone     Renal colic       Past Surgical History:   Procedure Laterality Date    ARTHROSCOPY KNEE BILATERAL      COLONOSCOPY  2013    Procedure: COLONOSCOPY;;  Surgeon: Lewis Metcalf MD;  Location: [facility]    COLONOSCOPY N/A 2019    Procedure: Combined Colonoscopy, Single Or Multiple Biopsy/Polypectomy By Biopsy;  Surgeon: Javier Judge MD;  Location: [facility]    COLONOSCOPY N/A 2021    Procedure: Colonoscopy, With Polypectomy And Biopsy;  Surgeon: Sasha Martinez DO;  Location: [facility]    COLONOSCOPY WITH CO2 INSUFFLATION N/A 2019    Procedure: COLONOSCOPY WITH CO2 INSUFFLATION;  Surgeon: Javier Judge MD;  Location: [facility]    COLONOSCOPY WITH CO2 INSUFFLATION N/A 2021    Procedure: COLONOSCOPY, WITH CO2 INSUFFLATION;  Surgeon: Cedrick Sheikh DO;  Location: [facility]    COLONOSCOPY WITH CO2 INSUFFLATION N/A 2021    Procedure: COLONOSCOPY, WITH CO2 INSUFFLATION;  Surgeon: Sasha Martinez DO;  Location: [facility]    HC CYSTOSCOPY,INSERT URETERAL STENT Right 2020    Procedure: CYSTOSCOPY, WITH FLEXIBLE URETEROSCOPY, RETROGRADES, AND STENT INSERTION RIGHT;  Surgeon: Wil Alejo MD;  Location: [facility];  Service: Urology    KNEE SURGERY Bilateral       Allergies   Allergen Reactions    Tetracycline Shortness Of Breath    Erythromycin       Social History     Tobacco Use    Smoking status: Never    Smokeless tobacco: Never   Substance Use Topics    Alcohol use: Yes     Comment: rare, one drink in 6 months      Wt Readings from Last 1 Encounters:   24 86.2 kg (190 lb)        Anesthesia Evaluation   Pt has had prior anesthetic. Type: MAC and General.   "      ROS/MED HX  ENT/Pulmonary:  - neg pulmonary ROS     Neurologic:  - neg neurologic ROS     Cardiovascular:  - neg cardiovascular ROS     METS/Exercise Tolerance:     Hematologic:  - neg hematologic  ROS     Musculoskeletal:  - neg musculoskeletal ROS (+)  arthritis,             GI/Hepatic: Comment: Has had esophageal dilation in the past - neg GI/hepatic ROS   (+) GERD,  esophageal disease, Stricture,    bowel prep,            Renal/Genitourinary:  - neg Renal ROS     Endo:  - neg endo ROS     Psychiatric/Substance Use:  - neg psychiatric ROS     Infectious Disease:  - neg infectious disease ROS     Malignancy:  - neg malignancy ROS     Other:  - neg other ROS          Physical Exam    Airway        Mallampati: I   TM distance: > 3 FB   Neck ROM: full   Mouth opening: > 3 cm    Respiratory Devices and Support         Dental     Comment: Upper and lower dentures        Cardiovascular   cardiovascular exam normal          Pulmonary   pulmonary exam normal                OUTSIDE LABS:  CBC:   Lab Results   Component Value Date    WBC 5.2 03/08/2021    WBC 5.8 01/10/2020    HGB 15.3 03/08/2021    HGB 15.2 01/10/2020    HCT 46.5 03/08/2021    HCT 47.1 01/10/2020     03/08/2021     01/10/2020     BMP:   Lab Results   Component Value Date     01/10/2020     01/04/2018    POTASSIUM 3.5 01/10/2020    POTASSIUM 3.9 01/04/2018    CHLORIDE 108 01/10/2020    CHLORIDE 103 01/04/2018    CO2 28 01/10/2020    CO2 28 01/04/2018    BUN 17 01/10/2020    BUN 15 01/04/2018    CR 0.91 01/10/2020    CR 0.89 01/04/2018    GLC 93 01/10/2020    GLC 82 01/04/2018     COAGS: No results found for: \"PTT\", \"INR\", \"FIBR\"  POC: No results found for: \"BGM\", \"HCG\", \"HCGS\"  HEPATIC:   Lab Results   Component Value Date    ALBUMIN 4.1 03/08/2021    PROTTOTAL 7.7 03/08/2021    ALT 32 03/08/2021    AST 23 03/08/2021    ALKPHOS 103 03/08/2021    BILITOTAL 0.9 03/08/2021     OTHER:   Lab Results   Component Value Date    MARIAA " 8.8 01/10/2020    LIPASE 84 03/08/2021    AMYLASE 44 01/04/2018    TSH 0.66 03/08/2021       Anesthesia Plan    ASA Status:  2       Anesthesia Type: MAC.     - Reason for MAC: immobility needed   Induction: Propofol.   Maintenance: TIVA.        Consents    Anesthesia Plan(s) and associated risks, benefits, and realistic alternatives discussed. Questions answered and patient/representative(s) expressed understanding.     - Discussed:     - Discussed with:  Patient      - Extended Intubation/Ventilatory Support Discussed: No.      - Patient is DNR/DNI Status: No     Use of blood products discussed: No .     Postoperative Care    Pain management: IV analgesics.   PONV prophylaxis: Ondansetron (or other 5HT-3)     Comments:               Sindi Short MD    I have reviewed the pertinent notes and labs in the chart from the past 30 days and (re)examined the patient.  Any updates or changes from those notes are reflected in this note.

## 2024-04-12 NOTE — OP NOTE
Freeman Velazquez  [id number]  male  64 year old      Reason for procedure/surgery: Screening    Patient Active Problem List   Diagnosis    Family history of malignant neoplasm of prostate    ACP (advance care planning)    Family history of malignant melanoma of skin    Gastroesophageal reflux disease with esophagitis    Osteoarthritis of both knees, unspecified osteoarthritis type    Renal colic    Calculus of ureter    Calculus of kidney       Past Surgical History:    Past Surgical History:   Procedure Laterality Date    ARTHROSCOPY KNEE BILATERAL      COLONOSCOPY  4/16/2013    Procedure: COLONOSCOPY;;  Surgeon: Lewis Metcalf MD;  Location: [facility]    COLONOSCOPY N/A 1/24/2019    Procedure: Combined Colonoscopy, Single Or Multiple Biopsy/Polypectomy By Biopsy;  Surgeon: Javier Judge MD;  [facility]: [facility]    COLONOSCOPY N/A 4/6/2021    Procedure: Colonoscopy, With Polypectomy And Biopsy;  Surgeon: Sasha Martinez DO;  Location: [facility]    COLONOSCOPY WITH CO2 INSUFFLATION N/A 1/24/2019    Procedure: COLONOSCOPY WITH CO2 INSUFFLATION;  Surgeon: Javier Judge MD;  [facility]    COLONOSCOPY WITH CO2 INSUFFLATION N/A 4/5/2021    Procedure: COLONOSCOPY, WITH CO2 INSUFFLATION;  Surgeon: Cedrick Sheikh DO;  Location: [facility]    COLONOSCOPY WITH CO2 INSUFFLATION N/A 4/6/2021    Procedure: COLONOSCOPY, WITH CO2 INSUFFLATION;  Surgeon: Sasha Martinez DO;  Location: [facility]    HC CYSTOSCOPY,INSERT URETERAL STENT Right 1/21/2020    Procedure: CYSTOSCOPY, WITH FLEXIBLE URETEROSCOPY, RETROGRADES, AND STENT INSERTION RIGHT;  Surgeon: Wil Alejo MD;  Location: [facility];  Service: Urology    KNEE SURGERY Bilateral        Past Medical History:   Past Medical History:   Diagnosis Date    Arthritis     Distal radius fracture 7/20/2011    GERD (gastroesophageal reflux disease)     Kidney stone     Renal colic        Social History:   Social History     Tobacco Use    Smoking status:  "Never    Smokeless tobacco: Never   Substance Use Topics    Alcohol use: Yes     Comment: rare, one drink in 6 months       Family History:   Family History   Problem Relation Age of Onset    Breast Cancer Mother     Allergies Mother     Anesthesia Reaction Mother     Arthritis Mother     Blood Disease Mother     Cancer Mother         breast    Cardiovascular Father     Eye Disorder Father     Heart Disease Father     Cancer Father         skin    Macular Degeneration Father     Glaucoma Father     Urolithiasis Father     Melanoma Father     Diabetes Sister     Diabetes Sister     Diabetes Maternal Grandmother     Skin Cancer Paternal Uncle     Thyroid Disease No family hx of     Cerebrovascular Disease No family hx of     Hypertension No family hx of     Gout No family hx of     Clotting Disorder No family hx of        Allergies:   Allergies   Allergen Reactions    Tetracycline Shortness Of Breath    Erythromycin        Active Medications:   No current outpatient medications on file.       Physical Examination:   Vital Signs: /85   Pulse 68   Temp 96.8  F (36  C) (Temporal)   Resp 18   Ht 1.88 m (6' 2\")   Wt 86.2 kg (190 lb)   SpO2 97%   BMI 24.39 kg/m    GA: NAD, A&O x3  L: nonlabored breathing on RA  CV: RRR  ABD: non tender  Neuro: grossly normal    Plan: Appropriate to proceed as scheduled.      Shemar Núñez MD    Division of Colon & Rectal Surgery  Department of Surgery  [facility]  [phone]    4/12/2024    PCP:  No Ref-Primary, Physician    "

## 2024-04-13 NOTE — ANESTHESIA POSTPROCEDURE EVALUATION
Patient: Freeman Velazquez    Procedure: Procedure(s):  Colonoscopy       Anesthesia Type:  MAC    Note:  Disposition: Outpatient   Postop Pain Control: Uneventful            Sign Out: Well controlled pain   PONV: No   Neuro/Psych: Uneventful            Sign Out: Acceptable/Baseline neuro status   Airway/Respiratory: Uneventful            Sign Out: Acceptable/Baseline resp. status   CV/Hemodynamics: Uneventful            Sign Out: Acceptable CV status; No obvious hypovolemia; No obvious fluid overload   Other NRE: NONE   DID A NON-ROUTINE EVENT OCCUR? No       Last vitals:  Vitals Value Taken Time   /60 04/12/24 1314   Temp 36.1  C (97  F) 04/12/24 1314   Pulse     Resp 16 04/12/24 1314   SpO2 98 % 04/12/24 1314       Electronically Signed By: Sindi Short MD  April 13, 2024  4:42 PM

## 2024-05-20 ENCOUNTER — HOSPITAL ENCOUNTER (EMERGENCY)
Facility: HOSPITAL | Age: 64
Discharge: HOME | End: 2024-05-20
Attending: STUDENT IN AN ORGANIZED HEALTH CARE EDUCATION/TRAINING PROGRAM | Admitting: STUDENT IN AN ORGANIZED HEALTH CARE EDUCATION/TRAINING PROGRAM
Payer: MEDICARE

## 2024-05-20 ENCOUNTER — APPOINTMENT (OUTPATIENT)
Dept: CT IMAGING | Facility: HOSPITAL | Age: 64
End: 2024-05-20
Attending: STUDENT IN AN ORGANIZED HEALTH CARE EDUCATION/TRAINING PROGRAM
Payer: MEDICARE

## 2024-05-20 ENCOUNTER — NURSE TRIAGE (OUTPATIENT)
Dept: NURSING | Facility: CLINIC | Age: 64
End: 2024-05-20
Payer: MEDICARE

## 2024-05-20 VITALS
BODY MASS INDEX: 24.39 KG/M2 | DIASTOLIC BLOOD PRESSURE: 68 MMHG | OXYGEN SATURATION: 97 % | WEIGHT: 190 LBS | TEMPERATURE: 98 F | HEART RATE: 64 BPM | SYSTOLIC BLOOD PRESSURE: 133 MMHG | RESPIRATION RATE: 20 BRPM

## 2024-05-20 DIAGNOSIS — R10.9 LEFT FLANK PAIN: ICD-10-CM

## 2024-05-20 LAB
ALBUMIN UR-MCNC: 10 MG/DL
ANION GAP SERPL CALCULATED.3IONS-SCNC: 9 MMOL/L (ref 7–15)
APPEARANCE UR: CLEAR
BASOPHILS # BLD AUTO: 0 10E3/UL (ref 0–0.2)
BASOPHILS NFR BLD AUTO: 0 %
BILIRUB UR QL STRIP: NEGATIVE
BUN SERPL-MCNC: 16 MG/DL (ref 8–23)
CALCIUM SERPL-MCNC: 9.4 MG/DL (ref 8.8–10.2)
CHLORIDE SERPL-SCNC: 104 MMOL/L (ref 98–107)
COLOR UR AUTO: YELLOW
CREAT SERPL-MCNC: 1.08 MG/DL (ref 0.67–1.17)
DEPRECATED HCO3 PLAS-SCNC: 27 MMOL/L (ref 22–29)
EGFRCR SERPLBLD CKD-EPI 2021: 76 ML/MIN/1.73M2
EOSINOPHIL # BLD AUTO: 0.1 10E3/UL (ref 0–0.7)
EOSINOPHIL NFR BLD AUTO: 2 %
ERYTHROCYTE [DISTWIDTH] IN BLOOD BY AUTOMATED COUNT: 14.2 % (ref 10–15)
GLUCOSE SERPL-MCNC: 102 MG/DL (ref 70–99)
GLUCOSE UR STRIP-MCNC: NEGATIVE MG/DL
HCT VFR BLD AUTO: 46.8 % (ref 40–53)
HGB BLD-MCNC: 15.2 G/DL (ref 13.3–17.7)
HGB UR QL STRIP: ABNORMAL
IMM GRANULOCYTES # BLD: 0 10E3/UL
IMM GRANULOCYTES NFR BLD: 0 %
KETONES UR STRIP-MCNC: NEGATIVE MG/DL
LEUKOCYTE ESTERASE UR QL STRIP: ABNORMAL
LYMPHOCYTES # BLD AUTO: 1.3 10E3/UL (ref 0.8–5.3)
LYMPHOCYTES NFR BLD AUTO: 16 %
MCH RBC QN AUTO: 29.3 PG (ref 26.5–33)
MCHC RBC AUTO-ENTMCNC: 32.5 G/DL (ref 31.5–36.5)
MCV RBC AUTO: 90 FL (ref 78–100)
MONOCYTES # BLD AUTO: 0.7 10E3/UL (ref 0–1.3)
MONOCYTES NFR BLD AUTO: 8 %
MUCOUS THREADS #/AREA URNS LPF: PRESENT /LPF
NEUTROPHILS # BLD AUTO: 6.1 10E3/UL (ref 1.6–8.3)
NEUTROPHILS NFR BLD AUTO: 74 %
NITRATE UR QL: NEGATIVE
NRBC # BLD AUTO: 0 10E3/UL
NRBC BLD AUTO-RTO: 0 /100
PH UR STRIP: 5.5 [PH] (ref 5–7)
PLATELET # BLD AUTO: 334 10E3/UL (ref 150–450)
POTASSIUM SERPL-SCNC: 4 MMOL/L (ref 3.4–5.3)
RBC # BLD AUTO: 5.18 10E6/UL (ref 4.4–5.9)
RBC URINE: 21 /HPF
SODIUM SERPL-SCNC: 140 MMOL/L (ref 135–145)
SP GR UR STRIP: 1.02 (ref 1–1.03)
SQUAMOUS EPITHELIAL: 1 /HPF
TRANSITIONAL EPI: 1 /HPF
UROBILINOGEN UR STRIP-MCNC: <2 MG/DL
WBC # BLD AUTO: 8.3 10E3/UL (ref 4–11)
WBC URINE: 7 /HPF

## 2024-05-20 PROCEDURE — 80048 BASIC METABOLIC PNL TOTAL CA: CPT | Performed by: STUDENT IN AN ORGANIZED HEALTH CARE EDUCATION/TRAINING PROGRAM

## 2024-05-20 PROCEDURE — 74177 CT ABD & PELVIS W/CONTRAST: CPT | Mod: MG

## 2024-05-20 PROCEDURE — 99285 EMERGENCY DEPT VISIT HI MDM: CPT | Mod: 25

## 2024-05-20 PROCEDURE — 36415 COLL VENOUS BLD VENIPUNCTURE: CPT | Performed by: STUDENT IN AN ORGANIZED HEALTH CARE EDUCATION/TRAINING PROGRAM

## 2024-05-20 PROCEDURE — G1010 CDSM STANSON: HCPCS

## 2024-05-20 PROCEDURE — 250N000011 HC RX IP 250 OP 636: Performed by: STUDENT IN AN ORGANIZED HEALTH CARE EDUCATION/TRAINING PROGRAM

## 2024-05-20 PROCEDURE — 96375 TX/PRO/DX INJ NEW DRUG ADDON: CPT

## 2024-05-20 PROCEDURE — 258N000003 HC RX IP 258 OP 636: Performed by: STUDENT IN AN ORGANIZED HEALTH CARE EDUCATION/TRAINING PROGRAM

## 2024-05-20 PROCEDURE — 250N000013 HC RX MED GY IP 250 OP 250 PS 637: Performed by: STUDENT IN AN ORGANIZED HEALTH CARE EDUCATION/TRAINING PROGRAM

## 2024-05-20 PROCEDURE — 81001 URINALYSIS AUTO W/SCOPE: CPT | Performed by: STUDENT IN AN ORGANIZED HEALTH CARE EDUCATION/TRAINING PROGRAM

## 2024-05-20 PROCEDURE — 96374 THER/PROPH/DIAG INJ IV PUSH: CPT | Mod: 59

## 2024-05-20 PROCEDURE — 96361 HYDRATE IV INFUSION ADD-ON: CPT

## 2024-05-20 PROCEDURE — 85025 COMPLETE CBC W/AUTO DIFF WBC: CPT | Performed by: STUDENT IN AN ORGANIZED HEALTH CARE EDUCATION/TRAINING PROGRAM

## 2024-05-20 RX ORDER — ONDANSETRON 4 MG/1
4 TABLET, ORALLY DISINTEGRATING ORAL ONCE
Status: COMPLETED | OUTPATIENT
Start: 2024-05-20 | End: 2024-05-20

## 2024-05-20 RX ORDER — HYDROMORPHONE HYDROCHLORIDE 1 MG/ML
0.5 INJECTION, SOLUTION INTRAMUSCULAR; INTRAVENOUS; SUBCUTANEOUS ONCE
Status: COMPLETED | OUTPATIENT
Start: 2024-05-20 | End: 2024-05-20

## 2024-05-20 RX ORDER — KETOROLAC TROMETHAMINE 15 MG/ML
15 INJECTION, SOLUTION INTRAMUSCULAR; INTRAVENOUS ONCE
Status: COMPLETED | OUTPATIENT
Start: 2024-05-20 | End: 2024-05-20

## 2024-05-20 RX ORDER — IOPAMIDOL 755 MG/ML
90 INJECTION, SOLUTION INTRAVASCULAR ONCE
Status: COMPLETED | OUTPATIENT
Start: 2024-05-20 | End: 2024-05-20

## 2024-05-20 RX ORDER — ACETAMINOPHEN 325 MG/1
975 TABLET ORAL ONCE
Status: COMPLETED | OUTPATIENT
Start: 2024-05-20 | End: 2024-05-20

## 2024-05-20 RX ADMIN — Medication 50 MG: at 20:17

## 2024-05-20 RX ADMIN — KETOROLAC TROMETHAMINE 15 MG: 15 INJECTION, SOLUTION INTRAMUSCULAR; INTRAVENOUS at 21:04

## 2024-05-20 RX ADMIN — ONDANSETRON 4 MG: 4 TABLET, ORALLY DISINTEGRATING ORAL at 20:17

## 2024-05-20 RX ADMIN — HYDROMORPHONE HYDROCHLORIDE 0.5 MG: 1 INJECTION, SOLUTION INTRAMUSCULAR; INTRAVENOUS; SUBCUTANEOUS at 20:34

## 2024-05-20 RX ADMIN — IOPAMIDOL 90 ML: 755 INJECTION, SOLUTION INTRAVENOUS at 21:35

## 2024-05-20 RX ADMIN — SODIUM CHLORIDE 1000 ML: 9 INJECTION, SOLUTION INTRAVENOUS at 21:01

## 2024-05-20 ASSESSMENT — COLUMBIA-SUICIDE SEVERITY RATING SCALE - C-SSRS
6. HAVE YOU EVER DONE ANYTHING, STARTED TO DO ANYTHING, OR PREPARED TO DO ANYTHING TO END YOUR LIFE?: NO
1. IN THE PAST MONTH, HAVE YOU WISHED YOU WERE DEAD OR WISHED YOU COULD GO TO SLEEP AND NOT WAKE UP?: NO
2. HAVE YOU ACTUALLY HAD ANY THOUGHTS OF KILLING YOURSELF IN THE PAST MONTH?: NO

## 2024-05-20 ASSESSMENT — ACTIVITIES OF DAILY LIVING (ADL)
ADLS_ACUITY_SCORE: 33
ADLS_ACUITY_SCORE: 35
ADLS_ACUITY_SCORE: 35

## 2024-05-20 NOTE — TELEPHONE ENCOUNTER
Nurse Triage SBAR    Is this a 2nd Level Triage? NO    Situation: Flank Pain    Background: Pt is having left sided back / flank pain. Has history of kidney stones and thinks this is another one    Assessment: C/o Nausea and worsening flank pain since this morning which is now severe. Denies shock symptoms or loss of consciousness     Protocol Recommended Disposition:   Go to ED Now    Recommendation: Advised to go to ED now. Pt agreeable, will go to [facility] or [place]          Does the patient meet one of the following criteria for ADS visit consideration? No      Reason for Disposition   [1] SEVERE pain (e.g., excruciating, scale 8-10) AND [2] present > 1 hour    Additional Information   Negative: Passed out (i.e., lost consciousness, collapsed and was not responding)   Negative: Shock suspected (e.g., cold/pale/clammy skin, too weak to stand, low BP, rapid pulse)   Negative: Difficult to awaken or acting confused (e.g., disoriented, slurred speech)   Negative: Sounds like a life-threatening emergency to the triager    Protocols used: Flank Pain-A-AH

## 2024-05-21 NOTE — DISCHARGE INSTRUCTIONS
Fortunately workup today in the emergency department reassuring.  There is little bit of blood in your urine but it does not suggest any infection.  There is no signs of a kidney stone still stuck in your ureter however it is possible that he spontaneously passed it sometime today prior to the CT scan.  You should keep an eye out for significant worsening abdominal pain, fevers, intractable vomiting or other concerning symptoms and if these recur you should return to the emergency department for repeat evaluation.

## 2024-05-21 NOTE — ED PROVIDER NOTES
EMERGENCY DEPARTMENT ENCOUNTER      NAME: Freeman Velazquez  AGE: 64 year old male  YOB: 1960  MRN: [id number]  EVALUATION DATE & TIME: No admission date for patient encounter.    PCP: No Ref-Primary, Physician    ED PROVIDER: Nii Harrison MD      Chief Complaint   Patient presents with    Flank Pain     left         FINAL IMPRESSION:  1. Left flank pain          ED COURSE & MEDICAL DECISION MAKING:    Pertinent Labs & Imaging studies reviewed. (See chart for details)  64 year old male presents to the Emergency Department for evaluation of abdominal pain    ED Course as of 05/21/24 0024   Mon May 20, 2024   2041 Patient is a 64-year-old male with history of prior kidney stone and renal colic, osteoarthritis and GERD presents emergency department for evaluation of left-sided abdominal and flank pain.  Patient pain first began this morning in the ED described as about a 2 out of 10 but it has progressively gotten worse throughout the day.  Localizes most to the left lower quadrant.  Has had nausea without any vomiting.  Has not noticed any hematuria or dysuria.  No blood in the stools.  No chest pain or shortness of breath.  Denies any fevers.  He has required ureteral stenting in the past for kidney stones.    Exam here patient is hypertensive but otherwise hemodynamically stable and afebrile.  Has some focal tenderness in the left lower quadrant with some voluntary guarding as well as some mild tenderness palpation of the left flank.  No CVA tenderness.  Heart is regular rate and rhythm.  Lungs are clear without any wheezes rales.    Initial differential includes was not limited to nephroureterolithiasis, diverticulitis, or atypical presentation of appendicitis.  Will obtain blood and urine and a CT of the abdomen pelvis to evaluate for acute intra-abdominal process.   2239 CT Abdomen Pelvis w Contrast       Labs reviewed and interpreted myself.  CBC is reassuring without any significant leukocytosis.   Renal function  within normal limits as well.  No significant electrolyte derangements.  Urinalysis shows some hematuria but does not strongly suggest urinary tract infection.    CT abdomen pelvis shows minimal perinephric stranding but no significant hydronephrosis or other acute abdominal process.  It is possible patient recently passed a kidney stone prior to getting the CT scan.    Upon repeat evaluation patient's pain is entirely resolved.  At this point in time the reassuring workup and imaging do not believe requires further emergent testing or hospitalization.  Discussed with patient keep an eye on his symptoms if he develops significant worsening abdominal pain, intractable vomiting, fevers or other concerning symptoms he should return to the emergency department for repeat evaluation.    8:33 PM I met and evaluated the patient.   10:41 PM Reassessed and updated patient with findings. We discussed the plan for discharge and the patient is agreeable. Reviewed supportive cares, symptomatic treatment, outpatient follow up, and reasons to return to the Emergency Department. Patient to be discharged by ED RN.     Medical Decision Making  Obtained supplemental history:Supplemental history obtained?: No  Reviewed external records: External records reviewed?: Inpatient Record: RN triage line 5/20/2024  Care impacted by chronic illness:N/A  Care significantly affected by social determinants of health:Access to Medical Care  Did you consider but not order tests?: Work up considered but not performed and documented in chart, if applicable  Did you interpret images independently?: Independent interpretation of ECG and images noted in documentation, when applicable.  Consultation discussion with other provider:Did you involve another provider (consultant, MH, pharmacy, etc.)?: No  Discharge. No recommendations on prescription strength medication(s). I considered admission, but discharged patient after significant  clinical improvement.          At the conclusion of the encounter I discussed the results of all of the tests and the disposition. The questions were answered. The patient or family acknowledged understanding and was agreeable with the care plan.     0 minutes of critical care time     MEDICATIONS GIVEN IN THE EMERGENCY:  Medications   ondansetron (ZOFRAN ODT) ODT tab 4 mg (4 mg Oral $Given 5/20/24 2017)   dimenhyDRINATE chew tab 50 mg (50 mg Oral $Given 5/20/24 2017)   acetaminophen (TYLENOL) tablet 975 mg (975 mg Oral Not Given 5/20/24 2017)   HYDROmorphone (PF) (DILAUDID) injection 0.5 mg (0.5 mg Intravenous $Given 5/20/24 2034)   ketorolac (TORADOL) injection 15 mg (15 mg Intravenous $Given 5/20/24 2104)   sodium chloride 0.9% BOLUS 1,000 mL (0 mLs Intravenous Stopped 5/20/24 2216)   iopamidol (ISOVUE-370) solution 90 mL (90 mLs Intravenous $Given 5/20/24 2135)       NEW PRESCRIPTIONS STARTED AT TODAY'S ER VISIT  There are no discharge medications for this patient.         =================================================================    HPI    Patient information was obtained from: patient    Use of : N/A         Freeman Alex Velazquez is a 64 year old male with a pertinent history of kidney stone, renal colic, who presents to this ED by private vehicle for evaluation of flank pain.    Patient reports this morning, he developed persistent LLQ abdominal pain that initially was a 2/10 pain. Progressively since then, the pain worsened to a 8/10 pain. Throughout the day, it also started radiating to his left flank. He has a history of kidney stones with cystoscopies and stents and describes the pain as similar to then. The pain is constant and is currently a 7-8/10. He does associates nausea. He did take some zofran PTA.     He otherwise denies associating fever, vomiting, hematuria, diarrhea, blood in stool, chest pain, or shortness of breath. He is allergic to erythromycin and tetracycline. There were  no other concerns/complaints at this time.    Per chart review:  5/20/2024 (today) - Patient called [facility] RN triage line for left sided flank pain with a known history of kidney stones. He was advised to go to the ED for evaluation.     REVIEW OF SYSTEMS   Refer to the HPI    PAST MEDICAL HISTORY:  Past Medical History:   Diagnosis Date    Arthritis     Distal radius fracture 7/20/2011    GERD (gastroesophageal reflux disease)     Kidney stone     Renal colic        PAST SURGICAL HISTORY:  Past Surgical History:   Procedure Laterality Date    ARTHROSCOPY KNEE BILATERAL      COLONOSCOPY  4/16/2013    Procedure: COLONOSCOPY;;  Surgeon: Lewis Metcalf MD;  Location: [facility] OR    COLONOSCOPY N/A 1/24/2019    Procedure: Combined Colonoscopy, Single Or Multiple Biopsy/Polypectomy By Biopsy;  Surgeon: Javier Judge MD;  Location: [facility] OR    COLONOSCOPY N/A 4/6/2021    Procedure: Colonoscopy, With Polypectomy And Biopsy;  Surgeon: Sasha Martinez DO;  Location: [facility] OR    COLONOSCOPY N/A 4/12/2024    Procedure: Colonoscopy;  Surgeon: Shemar Núñez MD;  Location: [facility] OR    COLONOSCOPY WITH CO2 INSUFFLATION N/A 1/24/2019    Procedure: COLONOSCOPY WITH CO2 INSUFFLATION;  Surgeon: Javire Judge MD;  Location: [facility] OR    COLONOSCOPY WITH CO2 INSUFFLATION N/A 4/5/2021    Procedure: COLONOSCOPY, WITH CO2 INSUFFLATION;  Surgeon: Cedrick Sheikh DO;  Location: [facility] OR    COLONOSCOPY WITH CO2 INSUFFLATION N/A 4/6/2021    Procedure: COLONOSCOPY, WITH CO2 INSUFFLATION;  Surgeon: Sasha Martinez DO;  Location: [facility] OR    HC CYSTOSCOPY,INSERT URETERAL STENT Right 1/21/2020    Procedure: CYSTOSCOPY, WITH FLEXIBLE URETEROSCOPY, RETROGRADES, AND STENT INSERTION RIGHT;  Surgeon: Wil Alejo MD;  Location: [facility] OR;  Service: Urology    KNEE SURGERY Bilateral            CURRENT MEDICATIONS:    No current outpatient medications on file.      ALLERGIES:  Allergies   Allergen Reactions    Tetracycline  Shortness Of Breath    Erythromycin        FAMILY HISTORY:  Family History   Problem Relation Age of Onset    Breast Cancer Mother     Allergies Mother     Anesthesia Reaction Mother     Arthritis Mother     Blood Disease Mother     Cancer Mother         breast    Cardiovascular Father     Eye Disorder Father     Heart Disease Father     Cancer Father         skin    Macular Degeneration Father     Glaucoma Father     Urolithiasis Father     Melanoma Father     Diabetes Sister     Diabetes Sister     Diabetes Maternal Grandmother     Skin Cancer Paternal Uncle     Thyroid Disease No family hx of     Cerebrovascular Disease No family hx of     Hypertension No family hx of     Gout No family hx of     Clotting Disorder No family hx of        SOCIAL HISTORY:   Social History     Socioeconomic History    Marital status:    Occupational History     Employer: [employer]   Tobacco Use    Smoking status: Never    Smokeless tobacco: Never   Vaping Use    Vaping status: Never Used   Substance and Sexual Activity    Alcohol use: Yes     Comment: rare, one drink in 6 months    Drug use: No    Sexual activity: Not Currently     Partners: Female     Comment: many years   Other Topics Concern    Parent/sibling w/ CABG, MI or angioplasty before 65F 55M? No     Social Determinants of Health     Financial Resource Strain: Low Risk  (12/8/2023)    Financial Resource Strain     Within the past 12 months, have you or your family members you live with been unable to get utilities (heat, electricity) when it was really needed?: No   Food Insecurity: Low Risk  (12/8/2023)    Food Insecurity     Within the past 12 months, did you worry that your food would run out before you got money to buy more?: No     Within the past 12 months, did the food you bought just not last and you didn t have money to get more?: No   Transportation Needs: Low Risk  (12/8/2023)    Transportation Needs     Within the past 12 months, has  lack of transportation kept you from medical appointments, getting your medicines, non-medical meetings or appointments, work, or from getting things that you need?: No   Interpersonal Safety: Low Risk  (12/8/2023)    Interpersonal Safety     Do you feel physically and emotionally safe where you currently live?: Yes     Within the past 12 months, have you been hit, slapped, kicked or otherwise physically hurt by someone?: No     Within the past 12 months, have you been humiliated or emotionally abused in other ways by your partner or ex-partner?: No   Housing Stability: Low Risk  (12/8/2023)    Housing Stability     Do you have housing? : Yes     Are you worried about losing your housing?: No       VITALS:  /68   Pulse 64   Temp 98  F (36.7  C) (Oral)   Resp 20   Wt 86.2 kg (190 lb)   SpO2 97%   BMI 24.39 kg/m      PHYSICAL EXAM    Constitutional: Well developed, Well nourished, NAD,  HENT: Normocephalic, Atraumatic,  mucous membranes moist,   Neck- trachea midline, No stridor.    Eyes:EOMI, Conjunctiva normal, No discharge.   Respiratory: Normal breath sounds, No respiratory distress, No wheezing.    Cardiovascular: Normal heart rate, Regular rhythm,  No murmurs,   Abdominal: Soft, LLQ tenderness with voluntary guarding. Left flank tenderness. No rebound.  Musculoskeletal: no deformity or malalignment   Integument: Warm, Dry, No erythema,    Neurologic: Alert & oriented x 3   Psychiatric: Affect normal, Cooperative.      LAB:  All pertinent labs reviewed and interpreted.  Results for orders placed or performed during the hospital encounter of 05/20/24   CT Abdomen Pelvis w Contrast    Impression    IMPRESSION:   1.  There is minimal perinephric stranding about the left kidney, new since the January CT but otherwise age indeterminant.  2.  No abnormal enhancement to suggest pyelonephritis or any obstructive changes. Query recent stone passage?  3.  Bilateral nonobstructing intrarenal calculi again noted  as described above with the stone burden greatest on the left.  4.  Other noncritical findings as noted above.   UA with Microscopic reflex to Culture    Specimen: Urine, Midstream   Result Value Ref Range    Color Urine Yellow Colorless, Straw, Light Yellow, Yellow    Appearance Urine Clear Clear    Glucose Urine Negative Negative mg/dL    Bilirubin Urine Negative Negative    Ketones Urine Negative Negative mg/dL    Specific Gravity Urine 1.023 1.001 - 1.030    Blood Urine 0.2 mg/dL (A) Negative    pH Urine 5.5 5.0 - 7.0    Protein Albumin Urine 10 (A) Negative mg/dL    Urobilinogen Urine <2.0 <2.0 mg/dL    Nitrite Urine Negative Negative    Leukocyte Esterase Urine 25 Barber/uL (A) Negative    Mucus Urine Present (A) None Seen /LPF    RBC Urine 21 (H) <=2 /HPF    WBC Urine 7 (H) <=5 /HPF    Squamous Epithelials Urine 1 <=1 /HPF    Transitional Epithelials Urine 1 <=1 /HPF   Basic metabolic panel   Result Value Ref Range    Sodium 140 135 - 145 mmol/L    Potassium 4.0 3.4 - 5.3 mmol/L    Chloride 104 98 - 107 mmol/L    Carbon Dioxide (CO2) 27 22 - 29 mmol/L    Anion Gap 9 7 - 15 mmol/L    Urea Nitrogen 16.0 8.0 - 23.0 mg/dL    Creatinine 1.08 0.67 - 1.17 mg/dL    GFR Estimate 76 >60 mL/min/1.73m2    Calcium 9.4 8.8 - 10.2 mg/dL    Glucose 102 (H) 70 - 99 mg/dL   CBC with platelets and differential   Result Value Ref Range    WBC Count 8.3 4.0 - 11.0 10e3/uL    RBC Count 5.18 4.40 - 5.90 10e6/uL    Hemoglobin 15.2 13.3 - 17.7 g/dL    Hematocrit 46.8 40.0 - 53.0 %    MCV 90 78 - 100 fL    MCH 29.3 26.5 - 33.0 pg    MCHC 32.5 31.5 - 36.5 g/dL    RDW 14.2 10.0 - 15.0 %    Platelet Count 334 150 - 450 10e3/uL    % Neutrophils 74 %    % Lymphocytes 16 %    % Monocytes 8 %    % Eosinophils 2 %    % Basophils 0 %    % Immature Granulocytes 0 %    NRBCs per 100 WBC 0 <1 /100    Absolute Neutrophils 6.1 1.6 - 8.3 10e3/uL    Absolute Lymphocytes 1.3 0.8 - 5.3 10e3/uL    Absolute Monocytes 0.7 0.0 - 1.3 10e3/uL    Absolute  Eosinophils 0.1 0.0 - 0.7 10e3/uL    Absolute Basophils 0.0 0.0 - 0.2 10e3/uL    Absolute Immature Granulocytes 0.0 <=0.4 10e3/uL    Absolute NRBCs 0.0 10e3/uL       RADIOLOGY:  Reviewed all pertinent imaging. Please see official radiology report.  CT Abdomen Pelvis w Contrast   Final Result   IMPRESSION:    1.  There is minimal perinephric stranding about the left kidney, new since the January CT but otherwise age indeterminant.   2.  No abnormal enhancement to suggest pyelonephritis or any obstructive changes. Query recent stone passage?   3.  Bilateral nonobstructing intrarenal calculi again noted as described above with the stone burden greatest on the left.   4.  Other noncritical findings as noted above.          EKG:    None    PROCEDURES:   None      I, Maddie Mendez, am serving as a scribe to document services personally performed by Nii Harrison MD based on my observation and the provider's statements to me. I, Nii Harrison MD, attest that Maddie Mendez is acting in a scribe capacity, has observed my performance of the services and has documented them in accordance with my direction.    Nii Harrison MD  [facility] EMERGENCY DEPARTMENT  [facility] [facility] [id number] [phone]      Nii Harrison MD  05/21/24 0024

## 2024-05-21 NOTE — ED TRIAGE NOTES
Pt began having left lower abdominal pain this am that settled into left flank and became more severe as the day went on. Pt is also having nausea. Zofran taken at 1800 with some relief. Pt has had renal calculi in the past.      Triage Assessment (Adult)       Row Name 05/20/24 1900          Triage Assessment    Airway WDL WDL        Respiratory WDL    Respiratory WDL WDL        Skin Circulation/Temperature WDL    Skin Circulation/Temperature WDL WDL        Cardiac WDL    Cardiac WDL WDL        Peripheral/Neurovascular WDL    Peripheral Neurovascular WDL WDL        Cognitive/Neuro/Behavioral WDL    Cognitive/Neuro/Behavioral WDL WDL

## 2024-06-03 ENCOUNTER — HOSPITAL ENCOUNTER (EMERGENCY)
Facility: HOSPITAL | Age: 64
Discharge: HOME | End: 2024-06-03
Attending: EMERGENCY MEDICINE | Admitting: EMERGENCY MEDICINE
Payer: MEDICARE

## 2024-06-03 ENCOUNTER — APPOINTMENT (OUTPATIENT)
Dept: CT IMAGING | Facility: HOSPITAL | Age: 64
End: 2024-06-03
Attending: EMERGENCY MEDICINE
Payer: MEDICARE

## 2024-06-03 VITALS
BODY MASS INDEX: 24.51 KG/M2 | HEIGHT: 74 IN | TEMPERATURE: 97.5 F | OXYGEN SATURATION: 95 % | WEIGHT: 191 LBS | HEART RATE: 68 BPM | DIASTOLIC BLOOD PRESSURE: 76 MMHG | SYSTOLIC BLOOD PRESSURE: 133 MMHG | RESPIRATION RATE: 18 BRPM

## 2024-06-03 DIAGNOSIS — R10.9 FLANK PAIN: ICD-10-CM

## 2024-06-03 DIAGNOSIS — N20.1 URETEROLITHIASIS: ICD-10-CM

## 2024-06-03 DIAGNOSIS — R11.2 NAUSEA AND VOMITING, UNSPECIFIED VOMITING TYPE: ICD-10-CM

## 2024-06-03 LAB
ALBUMIN UR-MCNC: 20 MG/DL
ANION GAP SERPL CALCULATED.3IONS-SCNC: 10 MMOL/L (ref 7–15)
APPEARANCE UR: CLEAR
BASOPHILS # BLD AUTO: 0 10E3/UL (ref 0–0.2)
BASOPHILS NFR BLD AUTO: 0 %
BILIRUB UR QL STRIP: NEGATIVE
BUN SERPL-MCNC: 17.4 MG/DL (ref 8–23)
CALCIUM SERPL-MCNC: 8.7 MG/DL (ref 8.8–10.2)
CHLORIDE SERPL-SCNC: 103 MMOL/L (ref 98–107)
COLOR UR AUTO: YELLOW
CREAT SERPL-MCNC: 1.23 MG/DL (ref 0.67–1.17)
CRP SERPL-MCNC: 5.7 MG/L
DEPRECATED HCO3 PLAS-SCNC: 27 MMOL/L (ref 22–29)
EGFRCR SERPLBLD CKD-EPI 2021: 65 ML/MIN/1.73M2
EOSINOPHIL # BLD AUTO: 0.1 10E3/UL (ref 0–0.7)
EOSINOPHIL NFR BLD AUTO: 2 %
ERYTHROCYTE [DISTWIDTH] IN BLOOD BY AUTOMATED COUNT: 13.7 % (ref 10–15)
GLUCOSE SERPL-MCNC: 108 MG/DL (ref 70–99)
GLUCOSE UR STRIP-MCNC: NEGATIVE MG/DL
HCT VFR BLD AUTO: 44.8 % (ref 40–53)
HGB BLD-MCNC: 14.8 G/DL (ref 13.3–17.7)
HGB UR QL STRIP: ABNORMAL
IMM GRANULOCYTES # BLD: 0 10E3/UL
IMM GRANULOCYTES NFR BLD: 0 %
KETONES UR STRIP-MCNC: NEGATIVE MG/DL
LEUKOCYTE ESTERASE UR QL STRIP: NEGATIVE
LYMPHOCYTES # BLD AUTO: 1.6 10E3/UL (ref 0.8–5.3)
LYMPHOCYTES NFR BLD AUTO: 29 %
MCH RBC QN AUTO: 29.1 PG (ref 26.5–33)
MCHC RBC AUTO-ENTMCNC: 33 G/DL (ref 31.5–36.5)
MCV RBC AUTO: 88 FL (ref 78–100)
MONOCYTES # BLD AUTO: 0.5 10E3/UL (ref 0–1.3)
MONOCYTES NFR BLD AUTO: 9 %
MUCOUS THREADS #/AREA URNS LPF: PRESENT /LPF
NEUTROPHILS # BLD AUTO: 3.3 10E3/UL (ref 1.6–8.3)
NEUTROPHILS NFR BLD AUTO: 60 %
NITRATE UR QL: NEGATIVE
NRBC # BLD AUTO: 0 10E3/UL
NRBC BLD AUTO-RTO: 0 /100
PH UR STRIP: 5.5 [PH] (ref 5–7)
PLATELET # BLD AUTO: 302 10E3/UL (ref 150–450)
POTASSIUM SERPL-SCNC: 4 MMOL/L (ref 3.4–5.3)
RBC # BLD AUTO: 5.08 10E6/UL (ref 4.4–5.9)
RBC URINE: >182 /HPF
SODIUM SERPL-SCNC: 140 MMOL/L (ref 135–145)
SP GR UR STRIP: 1.03 (ref 1–1.03)
SQUAMOUS EPITHELIAL: <1 /HPF
UROBILINOGEN UR STRIP-MCNC: <2 MG/DL
WBC # BLD AUTO: 5.6 10E3/UL (ref 4–11)
WBC URINE: 1 /HPF

## 2024-06-03 PROCEDURE — 85025 COMPLETE CBC W/AUTO DIFF WBC: CPT | Performed by: STUDENT IN AN ORGANIZED HEALTH CARE EDUCATION/TRAINING PROGRAM

## 2024-06-03 PROCEDURE — 74176 CT ABD & PELVIS W/O CONTRAST: CPT | Mod: MG

## 2024-06-03 PROCEDURE — 36415 COLL VENOUS BLD VENIPUNCTURE: CPT | Performed by: STUDENT IN AN ORGANIZED HEALTH CARE EDUCATION/TRAINING PROGRAM

## 2024-06-03 PROCEDURE — 250N000011 HC RX IP 250 OP 636: Performed by: EMERGENCY MEDICINE

## 2024-06-03 PROCEDURE — 250N000013 HC RX MED GY IP 250 OP 250 PS 637: Performed by: STUDENT IN AN ORGANIZED HEALTH CARE EDUCATION/TRAINING PROGRAM

## 2024-06-03 PROCEDURE — 86140 C-REACTIVE PROTEIN: CPT | Performed by: EMERGENCY MEDICINE

## 2024-06-03 PROCEDURE — 250N000011 HC RX IP 250 OP 636: Performed by: STUDENT IN AN ORGANIZED HEALTH CARE EDUCATION/TRAINING PROGRAM

## 2024-06-03 PROCEDURE — 82374 ASSAY BLOOD CARBON DIOXIDE: CPT | Performed by: STUDENT IN AN ORGANIZED HEALTH CARE EDUCATION/TRAINING PROGRAM

## 2024-06-03 PROCEDURE — 258N000003 HC RX IP 258 OP 636: Performed by: EMERGENCY MEDICINE

## 2024-06-03 PROCEDURE — 81001 URINALYSIS AUTO W/SCOPE: CPT | Performed by: STUDENT IN AN ORGANIZED HEALTH CARE EDUCATION/TRAINING PROGRAM

## 2024-06-03 PROCEDURE — 96361 HYDRATE IV INFUSION ADD-ON: CPT

## 2024-06-03 PROCEDURE — 99285 EMERGENCY DEPT VISIT HI MDM: CPT | Mod: 25

## 2024-06-03 PROCEDURE — 96374 THER/PROPH/DIAG INJ IV PUSH: CPT

## 2024-06-03 PROCEDURE — 250N000013 HC RX MED GY IP 250 OP 250 PS 637: Performed by: EMERGENCY MEDICINE

## 2024-06-03 RX ORDER — OXYCODONE HYDROCHLORIDE 5 MG/1
5 TABLET ORAL ONCE
Status: COMPLETED | OUTPATIENT
Start: 2024-06-03 | End: 2024-06-03

## 2024-06-03 RX ORDER — KETOROLAC TROMETHAMINE 15 MG/ML
15 INJECTION, SOLUTION INTRAMUSCULAR; INTRAVENOUS ONCE
Status: COMPLETED | OUTPATIENT
Start: 2024-06-03 | End: 2024-06-03

## 2024-06-03 RX ORDER — DIMENHYDRINATE 50 MG
50 TABLET ORAL EVERY 6 HOURS PRN
Qty: 28 TABLET | Refills: 0 | Status: SHIPPED | OUTPATIENT
Start: 2024-06-03 | End: 2024-06-10

## 2024-06-03 RX ORDER — DIMENHYDRINATE 50 MG
50 TABLET ORAL AT BEDTIME
Qty: 7 TABLET | Refills: 0 | Status: SHIPPED | OUTPATIENT
Start: 2024-06-03 | End: 2024-06-10

## 2024-06-03 RX ORDER — ONDANSETRON 4 MG/1
4 TABLET, ORALLY DISINTEGRATING ORAL ONCE
Status: COMPLETED | OUTPATIENT
Start: 2024-06-03 | End: 2024-06-03

## 2024-06-03 RX ORDER — TAMSULOSIN HYDROCHLORIDE 0.4 MG/1
0.4 CAPSULE ORAL DAILY
Qty: 5 CAPSULE | Refills: 0 | Status: ON HOLD | OUTPATIENT
Start: 2024-06-03 | End: 2024-06-05

## 2024-06-03 RX ORDER — OXYCODONE HYDROCHLORIDE 5 MG/1
5 TABLET ORAL EVERY 4 HOURS PRN
Qty: 12 TABLET | Refills: 0 | Status: ON HOLD | OUTPATIENT
Start: 2024-06-03 | End: 2024-06-05

## 2024-06-03 RX ORDER — ACETAMINOPHEN 500 MG
1000 TABLET ORAL EVERY 6 HOURS
Qty: 56 TABLET | Refills: 0 | Status: SHIPPED | OUTPATIENT
Start: 2024-06-03 | End: 2024-06-10

## 2024-06-03 RX ORDER — ACETAMINOPHEN 325 MG/1
975 TABLET ORAL ONCE
Status: COMPLETED | OUTPATIENT
Start: 2024-06-03 | End: 2024-06-03

## 2024-06-03 RX ORDER — IBUPROFEN 200 MG
400 TABLET ORAL EVERY 6 HOURS
Qty: 56 TABLET | Refills: 0 | Status: SHIPPED | OUTPATIENT
Start: 2024-06-03 | End: 2024-06-04

## 2024-06-03 RX ADMIN — Medication 50 MG: at 20:52

## 2024-06-03 RX ADMIN — SODIUM CHLORIDE 1000 ML: 9 INJECTION, SOLUTION INTRAVENOUS at 21:17

## 2024-06-03 RX ADMIN — OXYCODONE HYDROCHLORIDE 5 MG: 5 TABLET ORAL at 23:21

## 2024-06-03 RX ADMIN — ACETAMINOPHEN 975 MG: 325 TABLET ORAL at 20:53

## 2024-06-03 RX ADMIN — ONDANSETRON 4 MG: 4 TABLET, ORALLY DISINTEGRATING ORAL at 20:52

## 2024-06-03 RX ADMIN — KETOROLAC TROMETHAMINE 15 MG: 15 INJECTION, SOLUTION INTRAMUSCULAR; INTRAVENOUS at 21:17

## 2024-06-03 ASSESSMENT — COLUMBIA-SUICIDE SEVERITY RATING SCALE - C-SSRS
1. IN THE PAST MONTH, HAVE YOU WISHED YOU WERE DEAD OR WISHED YOU COULD GO TO SLEEP AND NOT WAKE UP?: NO
2. HAVE YOU ACTUALLY HAD ANY THOUGHTS OF KILLING YOURSELF IN THE PAST MONTH?: NO
6. HAVE YOU EVER DONE ANYTHING, STARTED TO DO ANYTHING, OR PREPARED TO DO ANYTHING TO END YOUR LIFE?: NO

## 2024-06-03 ASSESSMENT — ACTIVITIES OF DAILY LIVING (ADL)
ADLS_ACUITY_SCORE: 35
ADLS_ACUITY_SCORE: 35
ADLS_ACUITY_SCORE: 33

## 2024-06-04 ENCOUNTER — TELEPHONE (OUTPATIENT)
Dept: UROLOGY | Facility: CLINIC | Age: 64
End: 2024-06-04

## 2024-06-04 ENCOUNTER — VIRTUAL VISIT (OUTPATIENT)
Dept: UROLOGY | Facility: CLINIC | Age: 64
End: 2024-06-04
Payer: MEDICARE

## 2024-06-04 VITALS — HEIGHT: 74 IN | WEIGHT: 190 LBS | BODY MASS INDEX: 24.38 KG/M2

## 2024-06-04 DIAGNOSIS — N20.0 NEPHROLITHIASIS: Primary | ICD-10-CM

## 2024-06-04 DIAGNOSIS — N20.1 URETEROLITHIASIS: ICD-10-CM

## 2024-06-04 PROCEDURE — 99214 OFFICE O/P EST MOD 30 MIN: CPT | Mod: 57 | Performed by: UROLOGY

## 2024-06-04 RX ORDER — KETOROLAC TROMETHAMINE 10 MG/1
10 TABLET, FILM COATED ORAL EVERY 6 HOURS PRN
Qty: 40 TABLET | Refills: 0 | Status: SHIPPED | OUTPATIENT
Start: 2024-06-04

## 2024-06-04 ASSESSMENT — PAIN SCALES - GENERAL: PAINLEVEL: SEVERE PAIN (7)

## 2024-06-04 NOTE — NURSING NOTE
Is the patient currently in the state of [place]? YES    Visit mode:VIDEO    If the visit is dropped, the patient can be reconnected by: VIDEO VISIT: Text to cell phone:   Telephone Information:   Mobile [phone]       Will anyone else be joining the visit? NO  (If patient encounters technical issues they should call [phone]    How would you like to obtain your AVS? MyChart    Are changes needed to the allergy or medication list? No    Are refills needed on medications prescribed by this physician? NO    Reason for visit: Consult    Janneth BARR

## 2024-06-04 NOTE — ED PROVIDER NOTES
EMERGENCY DEPARTMENT ENCOUNTER      NAME: Freeman Velazquez  AGE: 64 year old male  YOB: 1960  MRN: [id number]  EVALUATION DATE & TIME: 6/3/2024  9:01 PM    ED PROVIDER: Shalini Tovar MD    Chief Complaint   Patient presents with    Flank Pain       FINAL IMPRESSION  1. Ureterolithiasis    2. Flank pain    3. Nausea and vomiting, unspecified vomiting type        MEDICAL DECISION MAKING   Pertinent Labs & Imaging studies reviewed. (See chart for details)    Freeman Velazquez is a 64 year old male who presents for evaluation of flank pain.  Records reviewed.  Patient has a history of kidney stones and ureteral lithiasis, has seen urology in the past.  He was recently seen here in the ED on 5/20/2024 and at that time, it was felt that he most likely had recently passed stone.  He also has a history of BPH.  Appears patient was most recently seen by urology on 4/19/2022 and I reviewed that note.  Today, he presents with complaints of left-sided flank pain, nausea, and vomiting.  He reports that this feels consistent with previous kidney stones.    I considered a broad differential including but not limited to nephrolithiasis/ureterolithiasis, renal colic, pyelonephritis, UTI, hydronephrosis, thoracic or abdominal wall strain, KARINE, electrolyte derangement. I have low suspicion for AAA/dissection or other cardiovascular process given history and exam. Discussed options for workup and management. We agreed on plan for labs, UA, CT abdomen/pelvis, IV fluids, and management of symptoms with IV analgesic and antiemetic.  Tylenol, ODT zofran, and diphenhydramine were given in triage.     BMP was notable for creatinine of 1.23, slightly increased from baseline and consistent with mild KARINE.  And no other significant electrolyte derangement or acidosis.  CBC reassuring. No evidence of leukocytosis to suggest systemic infectious/inflammatory process. No acute anemia. PLTs wnl.  CRP elevated at 5.7, suggestive of  ureterolithiasis.  UA with blood and RBCs but no evidence of infection.  CT abdomen/pelvis showed evidence of a large, 8 x 9 mm calculus at the left UPJ with mild associated hydronephrosis.    I rechecked the patient and reviewed results.  He had near complete resolution of symptoms after initial interventions.  We discussed options for ongoing workup and management.  I also discussed the case with Dr. Cheung with [facility].  Dr. Cheung agreed with workup and management here and agreed that if patient's symptoms are controlled after interventions and he would like to go home, this would be very reasonable.  They will be able to get him in to clinic within the next couple of days for follow-up.  I updated patient after this conversation.  After some discussion, he decided he would like to go home today.  He was given 1 dose of p.o. oxycodone just prior to discharge.  Will also send prescriptions for dramamine, motrin, tylenol, zofran, and oxycodone with instructions from and referral to urology.     At the conclusion of the encounter I discussed  the results of all of the tests and the disposition with patient.   All questions were answered.  He acknowledged understanding and was involved in the decision making regarding the overall care plan. I discussed with patient the utility, limitations and findings of the exam/interventions/studies done during this visit as well as the list of differential diagnosis and symptoms to monitor/return to ER for.  Additional verbal discharge instructions were provided.     Medical Decision Making  Obtained supplemental history:Supplemental history obtained?: Documented in chart  Reviewed external records: External records reviewed?: Documented in chart  Care impacted by chronic illness:Other: kidney stones, GERD  Care significantly affected by social determinants of health:Access to Medical Care  Did you consider but not order tests?: Work up considered but not performed and documented  in chart, if applicable  Did you interpret images independently?: Independent interpretation of ECG and images noted in documentation, when applicable.  Consultation discussion with other provider:Did you involve another provider (consultant, , pharmacy, etc.)?: I discussed the care with another health care provider, see documentation for details.  Discharge. I prescribed additional prescription strength medication(s) as charted. I considered admission, but discharged the patient after share decision making conversation.      ED COURSE  9:06 PM I met with the patient, obtained history, performed physical exam, and discussed ED plan.  10:29 PM I rechecked on the patient and updated them on lab results and the plan.  10:49 PM I spoke to Dr. Wade with [facility].  11:05 PM I rechecked on the patient and updated them on lab results and the plan.    MEDICATIONS GIVEN IN THE ED  Medications   ondansetron (ZOFRAN ODT) ODT tab 4 mg (4 mg Oral $Given 6/3/24 2052)   dimenhyDRINATE chew tab 50 mg (50 mg Oral $Given 6/3/24 2052)   acetaminophen (TYLENOL) tablet 975 mg (975 mg Oral $Given 6/3/24 2053)   ketorolac (TORADOL) injection 15 mg (15 mg Intravenous $Given 6/3/24 2117)   sodium chloride 0.9% BOLUS 1,000 mL (0 mLs Intravenous Stopped 6/3/24 2315)   oxyCODONE (ROXICODONE) tablet 5 mg (5 mg Oral $Given 6/3/24 2321)       NEW PRESCRIPTIONS STARTED AT TODAY'S VISIT  Discharge Medication List as of 6/3/2024 11:24 PM        START taking these medications    Details   acetaminophen (TYLENOL) 500 MG tablet Take 2 tablets (1,000 mg) by mouth every 6 hours for 7 days, Disp-56 tablet, R-0, Local Print      !! dimenhyDRINATE (DRAMAMINE) 50 MG tablet Take 1 tablet (50 mg) by mouth at bedtime for 7 days, Disp-7 tablet, R-0, Local Print      !! dimenhyDRINATE (DRAMAMINE) 50 MG tablet Take 1 tablet (50 mg) by mouth every 6 hours as needed for other (kidney stone pain management), Disp-28 tablet, R-0, Local Print      ibuprofen  (ADVIL/MOTRIN) 200 MG tablet Take 2 tablets (400 mg) by mouth every 6 hours for 7 days, Disp-56 tablet, R-0, Local Print      oxyCODONE (ROXICODONE) 5 MG tablet Take 1 tablet (5 mg) by mouth every 4 hours as needed for severe pain If pain is not improved with acetaminophen and ibuprofen., Disp-12 tablet, R-0, Local Print      tamsulosin (FLOMAX) 0.4 MG capsule Take 1 capsule (0.4 mg) by mouth daily for 5 days, Disp-5 capsule, R-0, Local Print       !! - Potential duplicate medications found. Please discuss with provider.             =================================================================    HPI:    Patient information was obtained from: patient    Use of : N/A     Freeman Velazquez is a 64 year old male who presents flank pain.     At 1800, the patient was outside cleaning up when he had sudden onset of 8/10 left flank pain and 6 episodes of vomiting in 15 minutes. His flank pain is radiating to his right abdomen. The patient had stents put into his ureters due to kidney stones and his ureters being too narrow.    Denies fever, pain medications, history of kidney infection, or abdominal surgery, or any other complaints at this time.    Per chart review,  5/20/24 The patient visited [facility] ED for flank pain. CBC is reassuring without any significant leukocytosis. Renal function within normal limits as well. No significant electrolyte derangements. Urinalysis shows some hematuria but does not strongly suggest urinary tract infection. CT abdomen pelvis shows minimal perinephric stranding but no significant hydronephrosis or other acute abdominal process. It is possible patient recently passed a kidney stone prior to getting the CT scan.     RELEVANT HISTORY, MEDICATIONS, & ALLERGIES   Past medical history, surgical history, family history, medications, and allergies reviewed and pertinent noted in HPI.    REVIEW OF SYSTEMS:  A complete review of systems was performed with pertinent positives  "and negatives noted in the HPI. All other systems negative.     PHYSICAL EXAM:    Vitals: /76   Pulse 68   Temp 97.5  F (36.4  C) (Temporal)   Resp 18   Ht 1.88 m (6' 2\")   Wt 86.6 kg (191 lb)   SpO2 95%   BMI 24.52 kg/m     General: Alert and interactive, comfortable appearing.  HENT: Atraumatic. Full AROM of neck. MMM.  Cardiovascular: Regular rate and rhythm.   Chest/Pulmonary: Normal work of breathing. Speaking in complete sentences. Lungs CTAB. No chest wall tenderness or deformities.  Abdomen: Soft, nondistended. Nontender without guarding or rebound.  Back: Mild tenderness to the left flank.  Extremities: Normal AROM of all major joints.  Skin: Warm and dry. Normal skin color.   Neuro: Speech clear. CNs grossly intact. Moves all extremities spontaneously.   Psych: Normal affect/mood, cooperative, memory appropriate.    LAB  Labs Ordered and Resulted from Time of ED Arrival to Time of ED Departure   ROUTINE UA WITH MICROSCOPIC REFLEX TO CULTURE - Abnormal       Result Value    Color Urine Yellow      Appearance Urine Clear      Glucose Urine Negative      Bilirubin Urine Negative      Ketones Urine Negative      Specific Gravity Urine 1.029      Blood Urine >1.0 mg/dL (*)     pH Urine 5.5      Protein Albumin Urine 20 (*)     Urobilinogen Urine <2.0      Nitrite Urine Negative      Leukocyte Esterase Urine Negative      Mucus Urine Present (*)     RBC Urine >182 (*)     WBC Urine 1      Squamous Epithelials Urine <1     BASIC METABOLIC PANEL - Abnormal    Sodium 140      Potassium 4.0      Chloride 103      Carbon Dioxide (CO2) 27      Anion Gap 10      Urea Nitrogen 17.4      Creatinine 1.23 (*)     GFR Estimate 65      Calcium 8.7 (*)     Glucose 108 (*)    CRP INFLAMMATION - Abnormal    CRP Inflammation 5.70 (*)    CBC WITH PLATELETS AND DIFFERENTIAL    WBC Count 5.6      RBC Count 5.08      Hemoglobin 14.8      Hematocrit 44.8      MCV 88      MCH 29.1      MCHC 33.0      RDW 13.7      " Platelet Count 302      % Neutrophils 60      % Lymphocytes 29      % Monocytes 9      % Eosinophils 2      % Basophils 0      % Immature Granulocytes 0      NRBCs per 100 WBC 0      Absolute Neutrophils 3.3      Absolute Lymphocytes 1.6      Absolute Monocytes 0.5      Absolute Eosinophils 0.1      Absolute Basophils 0.0      Absolute Immature Granulocytes 0.0      Absolute NRBCs 0.0         RADIOLOGY  CT Abdomen Pelvis w/o Contrast   Final Result   IMPRESSION:    1.  9 x 8 mm calculus left UPJ. Mild left hydronephrosis.   2.  Bilateral renal calculi.               I, Scott Anne, am serving as a scribe to document services personally performed by Dr. Shalini Tovar based on my observation and the provider's statements to me. I, Shalini Tovar MD attest that Scott Anne is acting in a scribe capacity, has observed my performance of the services and has documented them in accordance with my direction.    Shalini Tovar M.D.  Emergency Medicine  [facility] EMERGENCY DEPARTMENT  [address] [place] [id number] [phone]  Dept: [phone]     Shalini Tovar MD  06/04/24 0233

## 2024-06-04 NOTE — LETTER
6/4/2024       RE: Freeman Velazquez  [address]  [facility] [place] [address]     Dear Colleague,    Thank you for referring your patient, Freeman Velazquez, to the [facility] at [facility]. Please see a copy of my visit note below.    Virtual Visit Details    Type of service:  Video Visit   Video Start Time:  11:15 AM  Video End Time: 11:40 AM    Originating Location (pt. Location): Home    Distant Location (provider location):  On-site  Platform used for Video Visit: [facility]    Name: Freeman Velazquez   MRN: [id number]  YOB: 1960    Assessment & Plan    Assessment and Plan:  64 year old male with a left obstructing 1 cm ureteropelvic junction stone and bilateral nephrolithiasis.     1. Ureterolithiasis  2.  Bilateral renal stones  - Adult Urology Referral    We discussed observation, shock wave lithotripsy, ureteroscopy and percutaneous nephrolithotomy as the main surgical approaches to upper urinary tract stones.  We reviewed risks and benefits including but not limited to the following: bleeding, infection, damage to adjacent organs, ureteral stricture, need for staged treatment, incomplete stone removal.    Ultimately the patient has elected to proceed with ureteroscopy with steerable vascular assisted suction  because of  ability to remove the stone.    We discussed the benefits and risks of bilateral ureteroscopy, including but not limited to, bleeding, infection, need for stent/stent related symptoms, injury to ureter, need for second procedure if unable to reach stone or residual fragments.       Plan:  -  120 min bilateral ureteroscopy    Umair Cheung MD  June 4, 2024         Chief Complaint: Left UPJ stone    History of Present Illness    History of Present Illness:  Freeman Velazquez is a 64 year old male seen in consultation for discussion of left obstructing UPJ stone.      The current episode was first  found due to severe renal colic starting 5/20/2024.  He had a CT scan done Which demonstrated some minimal left perinephric stranding but no obstructing stones.  He then again had pain that was severe [place] 3, 2024 and presented to the emergency department again.  I personally reviewed CT scan of the abdomen pelvis without contrast from [place] 3 which  demonstrated a 1 cm left obstructing ureteropelvic junction stone that had migrated from the lower pole.  There is also a 1 cm branching upper pole renal calculus present.  On the right side there is a nonobstructing 4 mm renal stone.    He is uncomfortable but pain is fairly manageable I will pain medication he was sent home with.    He has a history of nephrolithiasis with undergone ureteroscopy on both sides in 2020 with Dr. Alejo.  No interval stone events since 2020.      Pertinent stone history:  + Personal stone history  + Prior stone procedure  + Prior stone analysis  + Prior metabolic evaluation  -- Family stone history    Pertinent stone medical history  -- Obesity (Body mass index is 24.39 kg/m .)  -- Diabetes  -- Neurologic disease limiting mobility   -- Osteoporosis  -- Gout  -- Gastric bypass surgery  -- Sarcoidosis  -- Inflammatory bowel disease  -- History of non-stone  surgery     Pertinent medications:  -- Antiplatelet  -- Anticoagulant  -- Topiramate   -- Thiazide  -- Potassium supplement    Results    I reviewed internal labs, of which pertinent ones include:   Hemoglobin   Date Value Ref Range Status   06/03/2024 14.8 13.3 - 17.7 g/dL Final   03/08/2021 15.3 13.3 - 17.7 g/dL Final     Potassium   Date Value Ref Range Status   06/03/2024 4.0 3.4 - 5.3 mmol/L Final   01/10/2020 3.5 3.4 - 5.3 mmol/L Final     Creatinine   Date Value Ref Range Status   06/03/2024 1.23 (H) 0.67 - 1.17 mg/dL Final   01/10/2020 0.91 0.66 - 1.25 mg/dL Final     pH Urine   Date Value Ref Range Status   06/03/2024 5.5 5.0 - 7.0 Final   03/08/2021 5.5 5.0 - 7.0 pH Final        Calcium   Date Value Ref Range Status   06/03/2024 8.7 (L) 8.8 - 10.2 mg/dL Final   01/10/2020 8.8 8.5 - 10.1 mg/dL Final         I reviewed internal records which in summarized above.         Past Medical History:  Past Medical History:   Diagnosis Date    Arthritis     Distal radius fracture 7/20/2011    GERD (gastroesophageal reflux disease)     Kidney stone     Renal colic             Past Surgical History:  Past Surgical History:   Procedure Laterality Date    ARTHROSCOPY KNEE BILATERAL      COLONOSCOPY  4/16/2013    Procedure: COLONOSCOPY;;  Surgeon: Lewis Metcalf MD;  Location: [facility] OR    COLONOSCOPY N/A 1/24/2019    Procedure: Combined Colonoscopy, Single Or Multiple Biopsy/Polypectomy By Biopsy;  Surgeon: Javier Judge MD;  Location: [facility] OR    COLONOSCOPY N/A 4/6/2021    Procedure: Colonoscopy, With Polypectomy And Biopsy;  Surgeon: Sasha Martinez DO;  Location: [facility] OR    COLONOSCOPY N/A 4/12/2024    Procedure: Colonoscopy;  Surgeon: Shemar Núñez MD;  Location: [facility] OR    COLONOSCOPY WITH CO2 INSUFFLATION N/A 1/24/2019    Procedure: COLONOSCOPY WITH CO2 INSUFFLATION;  Surgeon: Javier Judge MD;  Location: [facility] OR    COLONOSCOPY WITH CO2 INSUFFLATION N/A 4/5/2021    Procedure: COLONOSCOPY, WITH CO2 INSUFFLATION;  Surgeon: Cedrick Sheikh DO;  Location: [facility] OR    COLONOSCOPY WITH CO2 INSUFFLATION N/A 4/6/2021    Procedure: COLONOSCOPY, WITH CO2 INSUFFLATION;  Surgeon: Sasha Martinez DO;  Location: [facility] OR    HC CYSTOSCOPY,INSERT URETERAL STENT Right 1/21/2020    Procedure: CYSTOSCOPY, WITH FLEXIBLE URETEROSCOPY, RETROGRADES, AND STENT INSERTION RIGHT;  Surgeon: Wil Alejo MD;  Location: [facility];  Service: Urology    KNEE SURGERY Bilateral             Social History:  Social History     Tobacco Use    Smoking status: Never    Smokeless tobacco: Never   Vaping Use    Vaping status: Never Used   Substance Use Topics    Alcohol use: Yes     Comment: rare, one  drink in 6 months    Drug use: No            Family History:  Family History   Problem Relation Age of Onset    Breast Cancer Mother     Allergies Mother     Anesthesia Reaction Mother     Arthritis Mother     Blood Disease Mother     Cancer Mother         breast    Cardiovascular Father     Eye Disorder Father     Heart Disease Father     Cancer Father         skin    Macular Degeneration Father     Glaucoma Father     Urolithiasis Father     Melanoma Father     Diabetes Sister     Diabetes Sister     Diabetes Maternal Grandmother     Skin Cancer Paternal Uncle     Thyroid Disease No family hx of     Cerebrovascular Disease No family hx of     Hypertension No family hx of     Gout No family hx of     Clotting Disorder No family hx of             Allergies:  Allergies   Allergen Reactions    Tetracycline Shortness Of Breath    Erythromycin             Medications:  Current Outpatient Medications   Medication Sig Dispense Refill    acetaminophen (TYLENOL) 500 MG tablet Take 2 tablets (1,000 mg) by mouth every 6 hours for 7 days 56 tablet 0    dimenhyDRINATE (DRAMAMINE) 50 MG tablet Take 1 tablet (50 mg) by mouth at bedtime for 7 days 7 tablet 0    dimenhyDRINATE (DRAMAMINE) 50 MG tablet Take 1 tablet (50 mg) by mouth every 6 hours as needed for other (kidney stone pain management) 28 tablet 0    ibuprofen (ADVIL/MOTRIN) 200 MG tablet Take 2 tablets (400 mg) by mouth every 6 hours for 7 days 56 tablet 0    oxyCODONE (ROXICODONE) 5 MG tablet Take 1 tablet (5 mg) by mouth every 4 hours as needed for severe pain If pain is not improved with acetaminophen and ibuprofen. 12 tablet 0    tamsulosin (FLOMAX) 0.4 MG capsule Take 1 capsule (0.4 mg) by mouth daily for 5 days 5 capsule 0     Current Facility-Administered Medications   Medication Dose Route Frequency Provider Last Rate Last Admin    lidocaine 1 % injection 5 mL  5 mL   Darren Jordan MD   5 mL at 08/17/20 1403    methylPREDNISolone (DEPO-MEDROL) injection  "80 mg  80 mg   Darren Jordan MD   80 mg at 08/17/20 1403       Physical Exam    Physical Exam:  B/P: Data Unavailable, T: Data Unavailable, P: Data Unavailable, R: Data Unavailable  Estimated body mass index is 24.39 kg/m  as calculated from the following:    Height as of this encounter: 1.88 m (6' 2\").    Weight as of this encounter: 86.2 kg (190 lb).  General Appearance Adult: Alert, no acute distress, oriented  Lungs: no respiratory distress, or pursed lip breathing  Neuro: Alert, oriented, speech and mentation normal  Psych: affect and mood normal    "

## 2024-06-04 NOTE — ED NOTES
Pt discharged ambulatory to home at 2335. All questions answered. Pt expressed understanding of info

## 2024-06-04 NOTE — TELEPHONE ENCOUNTER
Called pt for teaching and pt already had been contacted by pre-admissions.  NO other questions from the patient at this time.  He will plan for surgery tomorrow as scheduled.  VIDHI Pringle  Care Coordinator Urology  [phone]

## 2024-06-04 NOTE — PROGRESS NOTES
Virtual Visit Details    Type of service:  Video Visit   Video Start Time:  11:15 AM  Video End Time: 11:40 AM    Originating Location (pt. Location): Home    Distant Location (provider location):  On-site  Platform used for Video Visit: Hugo    Name: Freeman Velazquez   MRN: [id number]  YOB: 1960    Assessment & Plan    Assessment and Plan:  64 year old male with a left obstructing 1 cm ureteropelvic junction stone and bilateral nephrolithiasis.     1. Ureterolithiasis  2.  Bilateral renal stones  - Adult Urology Referral    We discussed observation, shock wave lithotripsy, ureteroscopy and percutaneous nephrolithotomy as the main surgical approaches to upper urinary tract stones.  We reviewed risks and benefits including but not limited to the following: bleeding, infection, damage to adjacent organs, ureteral stricture, need for staged treatment, incomplete stone removal.    Ultimately the patient has elected to proceed with ureteroscopy with steerable vascular assisted suction  because of  ability to remove the stone.    We discussed the benefits and risks of bilateral ureteroscopy, including but not limited to, bleeding, infection, need for stent/stent related symptoms, injury to ureter, need for second procedure if unable to reach stone or residual fragments.       Plan:  -  120 min bilateral ureteroscopy    Umair Cheung MD  June 4, 2024         Chief Complaint: Left UPJ stone    History of Present Illness    History of Present Illness:  Freeman Velazquez is a 64 year old male seen in consultation for discussion of left obstructing UPJ stone.      The current episode was first found due to severe renal colic starting 5/20/2024.  He had a CT scan done Which demonstrated some minimal left perinephric stranding but no obstructing stones.  He then again had pain that was severe [place] 3, 2024 and presented to the emergency department again.  I personally reviewed CT scan of the abdomen pelvis  without contrast from [place] 3 which  demonstrated a 1 cm left obstructing ureteropelvic junction stone that had migrated from the lower pole.  There is also a 1 cm branching upper pole renal calculus present.  On the right side there is a nonobstructing 4 mm renal stone.    He is uncomfortable but pain is fairly manageable I will pain medication he was sent home with.    He has a history of nephrolithiasis with undergone ureteroscopy on both sides in 2020 with Dr. Alejo.  No interval stone events since 2020.      Pertinent stone history:  + Personal stone history  + Prior stone procedure  + Prior stone analysis  + Prior metabolic evaluation  -- Family stone history    Pertinent stone medical history  -- Obesity (Body mass index is 24.39 kg/m .)  -- Diabetes  -- Neurologic disease limiting mobility   -- Osteoporosis  -- Gout  -- Gastric bypass surgery  -- Sarcoidosis  -- Inflammatory bowel disease  -- History of non-stone  surgery     Pertinent medications:  -- Antiplatelet  -- Anticoagulant  -- Topiramate   -- Thiazide  -- Potassium supplement    Results    I reviewed internal labs, of which pertinent ones include:   Hemoglobin   Date Value Ref Range Status   06/03/2024 14.8 13.3 - 17.7 g/dL Final   03/08/2021 15.3 13.3 - 17.7 g/dL Final     Potassium   Date Value Ref Range Status   06/03/2024 4.0 3.4 - 5.3 mmol/L Final   01/10/2020 3.5 3.4 - 5.3 mmol/L Final     Creatinine   Date Value Ref Range Status   06/03/2024 1.23 (H) 0.67 - 1.17 mg/dL Final   01/10/2020 0.91 0.66 - 1.25 mg/dL Final     pH Urine   Date Value Ref Range Status   06/03/2024 5.5 5.0 - 7.0 Final   03/08/2021 5.5 5.0 - 7.0 pH Final       Calcium   Date Value Ref Range Status   06/03/2024 8.7 (L) 8.8 - 10.2 mg/dL Final   01/10/2020 8.8 8.5 - 10.1 mg/dL Final         I reviewed internal records which in summarized above.         Past Medical History:  Past Medical History:   Diagnosis Date    Arthritis     Distal radius fracture 7/20/2011    GERD  (gastroesophageal reflux disease)     Kidney stone     Renal colic             Past Surgical History:  Past Surgical History:   Procedure Laterality Date    ARTHROSCOPY KNEE BILATERAL      COLONOSCOPY  4/16/2013    Procedure: COLONOSCOPY;;  Surgeon: Lewis Metcalf MD;  Location: [facility]    COLONOSCOPY N/A 1/24/2019    Procedure: Combined Colonoscopy, Single Or Multiple Biopsy/Polypectomy By Biopsy;  Surgeon: Javier Judge MD;  Location: [facility]    COLONOSCOPY N/A 4/6/2021    Procedure: Colonoscopy, With Polypectomy And Biopsy;  Surgeon: Sasha Martinez DO;  Location: [facility]    COLONOSCOPY N/A 4/12/2024    Procedure: Colonoscopy;  Surgeon: Shemar Núñez MD;  Location: [facility] OR    COLONOSCOPY WITH CO2 INSUFFLATION N/A 1/24/2019    Procedure: COLONOSCOPY WITH CO2 INSUFFLATION;  Surgeon: Javier Judge MD;  Location: [facility]    COLONOSCOPY WITH CO2 INSUFFLATION N/A 4/5/2021    Procedure: COLONOSCOPY, WITH CO2 INSUFFLATION;  Surgeon: Cedrick Sheikh DO;  Location: [facility]    COLONOSCOPY WITH CO2 INSUFFLATION N/A 4/6/2021    Procedure: COLONOSCOPY, WITH CO2 INSUFFLATION;  Surgeon: Sasha Martinez DO;  Location: [facility]    HC CYSTOSCOPY,INSERT URETERAL STENT Right 1/21/2020    Procedure: CYSTOSCOPY, WITH FLEXIBLE URETEROSCOPY, RETROGRADES, AND STENT INSERTION RIGHT;  Surgeon: Wil Alejo MD;  Location: [facility];  Service: Urology    KNEE SURGERY Bilateral             Social History:  Social History     Tobacco Use    Smoking status: Never    Smokeless tobacco: Never   Vaping Use    Vaping status: Never Used   Substance Use Topics    Alcohol use: Yes     Comment: rare, one drink in 6 months    Drug use: No            Family History:  Family History   Problem Relation Age of Onset    Breast Cancer Mother     Allergies Mother     Anesthesia Reaction Mother     Arthritis Mother     Blood Disease Mother     Cancer Mother         breast    Cardiovascular Father     Eye Disorder Father      "Heart Disease Father     Cancer Father         skin    Macular Degeneration Father     Glaucoma Father     Urolithiasis Father     Melanoma Father     Diabetes Sister     Diabetes Sister     Diabetes Maternal Grandmother     Skin Cancer Paternal Uncle     Thyroid Disease No family hx of     Cerebrovascular Disease No family hx of     Hypertension No family hx of     Gout No family hx of     Clotting Disorder No family hx of             Allergies:  Allergies   Allergen Reactions    Tetracycline Shortness Of Breath    Erythromycin             Medications:  Current Outpatient Medications   Medication Sig Dispense Refill    acetaminophen (TYLENOL) 500 MG tablet Take 2 tablets (1,000 mg) by mouth every 6 hours for 7 days 56 tablet 0    dimenhyDRINATE (DRAMAMINE) 50 MG tablet Take 1 tablet (50 mg) by mouth at bedtime for 7 days 7 tablet 0    dimenhyDRINATE (DRAMAMINE) 50 MG tablet Take 1 tablet (50 mg) by mouth every 6 hours as needed for other (kidney stone pain management) 28 tablet 0    ibuprofen (ADVIL/MOTRIN) 200 MG tablet Take 2 tablets (400 mg) by mouth every 6 hours for 7 days 56 tablet 0    oxyCODONE (ROXICODONE) 5 MG tablet Take 1 tablet (5 mg) by mouth every 4 hours as needed for severe pain If pain is not improved with acetaminophen and ibuprofen. 12 tablet 0    tamsulosin (FLOMAX) 0.4 MG capsule Take 1 capsule (0.4 mg) by mouth daily for 5 days 5 capsule 0     Current Facility-Administered Medications   Medication Dose Route Frequency Provider Last Rate Last Admin    lidocaine 1 % injection 5 mL  5 mL   Darren Jordan MD   5 mL at 08/17/20 1403    methylPREDNISolone (DEPO-MEDROL) injection 80 mg  80 mg   Darren Jordan MD   80 mg at 08/17/20 1403       Physical Exam    Physical Exam:  B/P: Data Unavailable, T: Data Unavailable, P: Data Unavailable, R: Data Unavailable  Estimated body mass index is 24.39 kg/m  as calculated from the following:    Height as of this encounter: 1.88 m (6' 2\").    " Weight as of this encounter: 86.2 kg (190 lb).  General Appearance Adult: Alert, no acute distress, oriented  Lungs: no respiratory distress, or pursed lip breathing  Neuro: Alert, oriented, speech and mentation normal  Psych: affect and mood normal

## 2024-06-04 NOTE — TELEPHONE ENCOUNTER
Add on for tomorrow     Spoke with: Patient       Date of surgery: Wednesday June 5 2024 with Dr Cheung       Location: [facility]       Informed patient they will need a adult : YES patients wife      Pre op with provider: Dr Cheung will do DOS      H&P Scheduled in PAC- NA         Pre procedure covid :NA      Additional imaging: NA        Surgery Packet :Sent via [facility]      Additional comments: Please call for surgery teaching

## 2024-06-04 NOTE — DISCHARGE INSTRUCTIONS
You were seen in the Emergency Department today for flank pain. As we discussed, your CT scan showed that you have a stone in your ureter. It is painful as the stone travels down from your kidney toward your bladder. Your urine did not look infected.      You are being sent with prescriptions for medications to help with recurrent symptoms. Please take as directed. You are also being sent with a strainer to use when you urinate so you can try to catch the stone.      Please return to the ER if you experience fever, worsening pain, inability to keep food/fluids down, and/or for any other new or concerning symptoms, otherwise please follow up with your primary doctor and urologist for recheck. You were given a referral to the urology clinic/stone specialists and someone should call your from their clinic to schedule an appointment.    Below is some information you might find useful.     Thank you for choosing [facility]. It was a pleasure taking care of you!  - Dr. Shalini Tovar        Recommended Steps to Success after Emergency Department Visit from the Kidney Specialists    Our experience has shown that early clinical contact with the urology can significantly improve patient experience with a kidney stone.    The goals of seeing a kidney stone specialist are to minimize life impact of stone disease on patients by:    Helping patients pass small stones   Promptly recognizing situations where stone passage is unlikely   Designing strategies for long-term prevention of recurrence    The urology team works closely with our patients during acute stone episodes to keep them well informed, safe and as comfortable as possible. We recognize that these stone events are a major interruption to your life. It may take up to a month for an event to resolve but our goal is that you should be able to maintain your usual activities including driving and going to work during this period.    The urology team will try to call  you the next day or next business day after discharge to schedule follow-up care. You can usually see a stone specialist within 24 hours of making contact. If you have not been contacted by 9:00 am, please call the clinic directly at [phone].    SIX STEPS TO CONTROLLING YOUR STONE EPISODE UNTIL SEEN IN CLINIC  DRINK TO THIRST - YOU CANNOT FLUSH OUT A STONE  TAKE THESE MEDICATIONS WHETHER YOU HAVE PAIN OR NOT:  Ibuprofen (Advil or Motrin) Take 2 (200mg) tablets every 6 hours until the stone passes  Dramamine (drowsy version, non-generic formulation) Take 50mg at bedtime every night until the stone passes. In addition, take every 6 hours as needed  Acetaminophen (Tylenol) Take 2 (500mg) tablets every 6 hours until stone passes  Use Ondansetron (Zofran) As prescribed for nausea  ONLY USE NARCOTIC PAIN KILLERS FOR SEVERE PAIN  IF YOU DEVELOP A FEVER, CALL [facility] IMMEDIATELY  CONTACT THE UROLOGY TEAM DIRECTLY FOR CONCERNS AT ANY TIME 24/7    If pain worsens or nausea/vomiting uncontrolled with medications, STOP eating & drinking. Call the Clinic immediately. Prior to surgery, you need to have an empty stomach for 8 hours.  [facility] can respond to your questions or concerns 24 hours a day at [phone].    What to Watch For:  FEVER is NEVER normal - Call the clinic immediately  Severe Nausea - if you cannot keep fluid or pain medication down call [facility]  Uncontrolled pain - Call the clinic  Absence of pain does not mean that the stone has passed. For a larger stone, only a CT scan showing that the stone is gone, or actually having the stone in your possession are reliable evidence of stone passage    Signs and Symptoms You Might Experience  Nausea  Decreased appetite  Urinary frequency  Bloody urine   Chills  Fatigue    Steps you can take to increase chances for passing your stone    Drink to Thirst:  Do not attempt to  flush out  your stone by drinking too much fluid.  This does not work and may  increase nausea.  Drink enough to satisfy your body s thirst.  Eating your normal diet is fine    Strain all Urine:  If you pass the stone, please try and save it if possible. Place it in the container we have provided and bring it to the [facility] clinic or any [facility] lab ([facility], [facility] or [facility]) within a week of passing it.    Medications: (that may be suggested or prescribed):    Ibuprofen (Advil or Motrin) has shown to be the most effective for the treatment of stone pain  Acetaminophen (Tylenol) is equally effective as narcotics for treating stone pain without the major side effects of narcotics    Ibuprofen (Advil or Motrin)   Take 2 (200mg) tablets every 6 hours until the stone passes.  Decreases pain  Prevents spasm of the ureter  Available over the counter      Dramamine (drowsy version, non-generic formulation  Take 50mg at bedtime every night until the stone passes  In addition, take 50mg every 6 hours as needed  Decreases spasm of the ureter  Decreases recurrence of pain for next 24 hours  Decreases acute pain  Decreases nausea  Will help you sleep   Available over the counter        *This medication will cause increased drowsiness, do not drive or operate machinery for 6 hours    Acetaminophen (Tylenol)   Take 2 (500mg) tablets every 6 hours until stone passes  Do not exceed 4000mg in 24 hours  Highly effective in controlling pain  Available over the counter    Narcotics (Oxycodone or Dilaudid)   Take 1-2 tablets every 4 hours as needed  Take ONLY for severe pain unrelieved by ibuprofen (Advil or Motrin), Dramamine, or Acetaminophen (Tylenol)  Narcotics have major side effects:  Confusion, disorientation and sedation - DO NOT DRIVE OR OPERATE MACHINERY WITHIN 24 HOURS  Nausea - take Dramamine or Zofran  or Haldol to help control  May cause constipation, start over the counter Miralax if needed to treat this  Sleep disturbances    Ondansetron (Zofran)  Take as prescribed  Reserve  for severe nausea    Prochlorperazine (Compazine)  Take as prescribed  Reserve for severe nausea      Natural History of Stone Passage  Crystals can form if chemicals are too concentrated in your urine.  If the crystal grows over time, a stone may form.  A stone usually is not painful while it is still in the kidney.  As the stone begins to leave the kidney, you may experience episodes of flank pain as the kidney stone approaches the entrance to the ureter. Some people feel a vague ache in the side.  Kidney stones may fall into the ureter.  Some stones are tiny and pass through without causing symptoms. The ureter is a small tube (approx 1/8 of an inch wide). A kidney stone can get stuck and block the ureter.  If this happens, urine backs up and flows back to the kidney. Back pressure on the kidney can cause:  Severe flank pain radiating to the groin  Severe nausea and vomiting   The pain can occur in the lower back, side, groin or all three.    The probability of a stone passing all the way down the ureter is related to its size (small is good) and how far it has traveled down the ureter at time of recognition (further is better).  When the stone reaches the lower ureter, this can irritate the bladder and sensations of feeling the urge to urinate frequently and urgently may occur.    Once the stone passes out of your ureter and into your bladder, the symptoms of urgency and frequency will often disappear.  Sometimes pain will come back for a short period and will not be as severe as before. The passage of the stone from your bladder and out of your body is usually not a problem. The urethra is at least twice as wide as the normal ureter, so the stone doesn t usually block it.

## 2024-06-05 ENCOUNTER — HOSPITAL ENCOUNTER (OUTPATIENT)
Facility: CLINIC | Age: 64
Discharge: HOME | End: 2024-06-05
Attending: UROLOGY | Admitting: UROLOGY
Payer: MEDICARE

## 2024-06-05 ENCOUNTER — ANESTHESIA EVENT (OUTPATIENT)
Dept: SURGERY | Facility: CLINIC | Age: 64
End: 2024-06-05
Payer: MEDICARE

## 2024-06-05 ENCOUNTER — APPOINTMENT (OUTPATIENT)
Dept: GENERAL RADIOLOGY | Facility: CLINIC | Age: 64
End: 2024-06-05
Attending: UROLOGY
Payer: MEDICARE

## 2024-06-05 ENCOUNTER — ANESTHESIA (OUTPATIENT)
Dept: SURGERY | Facility: CLINIC | Age: 64
End: 2024-06-05
Payer: MEDICARE

## 2024-06-05 VITALS
DIASTOLIC BLOOD PRESSURE: 70 MMHG | HEIGHT: 74 IN | TEMPERATURE: 98.6 F | OXYGEN SATURATION: 96 % | SYSTOLIC BLOOD PRESSURE: 136 MMHG | RESPIRATION RATE: 12 BRPM | WEIGHT: 190 LBS | HEART RATE: 83 BPM | BODY MASS INDEX: 24.38 KG/M2

## 2024-06-05 DIAGNOSIS — N20.0 CALCULUS OF KIDNEY: Primary | ICD-10-CM

## 2024-06-05 DIAGNOSIS — N20.0 BILATERAL NEPHROLITHIASIS: Primary | ICD-10-CM

## 2024-06-05 PROCEDURE — 250N000011 HC RX IP 250 OP 636: Performed by: STUDENT IN AN ORGANIZED HEALTH CARE EDUCATION/TRAINING PROGRAM

## 2024-06-05 PROCEDURE — 74420 UROGRAPHY RTRGR +-KUB: CPT | Mod: 26 | Performed by: UROLOGY

## 2024-06-05 PROCEDURE — C1758 CATHETER, URETERAL: HCPCS | Performed by: UROLOGY

## 2024-06-05 PROCEDURE — 710N000010 HC RECOVERY PHASE 1, LEVEL 2, PER MIN: Performed by: UROLOGY

## 2024-06-05 PROCEDURE — 710N000012 HC RECOVERY PHASE 2, PER MINUTE: Performed by: UROLOGY

## 2024-06-05 PROCEDURE — C2617 STENT, NON-COR, TEM W/O DEL: HCPCS | Performed by: UROLOGY

## 2024-06-05 PROCEDURE — 250N000025 HC SEVOFLURANE, PER MIN: Performed by: UROLOGY

## 2024-06-05 PROCEDURE — 52332 CYSTOSCOPY AND TREATMENT: CPT | Performed by: NURSE ANESTHETIST, CERTIFIED REGISTERED

## 2024-06-05 PROCEDURE — 370N000017 HC ANESTHESIA TECHNICAL FEE, PER MIN: Performed by: UROLOGY

## 2024-06-05 PROCEDURE — 250N000009 HC RX 250: Performed by: NURSE ANESTHETIST, CERTIFIED REGISTERED

## 2024-06-05 PROCEDURE — 360N000082 HC SURGERY LEVEL 2 W/ FLUORO, PER MIN: Performed by: UROLOGY

## 2024-06-05 PROCEDURE — 250N000011 HC RX IP 250 OP 636: Performed by: NURSE ANESTHETIST, CERTIFIED REGISTERED

## 2024-06-05 PROCEDURE — 250N000013 HC RX MED GY IP 250 OP 250 PS 637: Performed by: ANESTHESIOLOGY

## 2024-06-05 PROCEDURE — 258N000003 HC RX IP 258 OP 636: Performed by: NURSE ANESTHETIST, CERTIFIED REGISTERED

## 2024-06-05 PROCEDURE — 250N000011 HC RX IP 250 OP 636: Performed by: UROLOGY

## 2024-06-05 PROCEDURE — 999N000141 HC STATISTIC PRE-PROCEDURE NURSING ASSESSMENT: Performed by: UROLOGY

## 2024-06-05 PROCEDURE — 52332 CYSTOSCOPY AND TREATMENT: CPT | Mod: 50 | Performed by: UROLOGY

## 2024-06-05 PROCEDURE — 999N000180 XR SURGERY CARM FLUORO LESS THAN 5 MIN: Mod: TC

## 2024-06-05 PROCEDURE — 272N000001 HC OR GENERAL SUPPLY STERILE: Performed by: UROLOGY

## 2024-06-05 PROCEDURE — 255N000002 HC RX 255 OP 636: Performed by: UROLOGY

## 2024-06-05 PROCEDURE — 52332 CYSTOSCOPY AND TREATMENT: CPT | Performed by: STUDENT IN AN ORGANIZED HEALTH CARE EDUCATION/TRAINING PROGRAM

## 2024-06-05 PROCEDURE — 250N000009 HC RX 250: Performed by: UROLOGY

## 2024-06-05 PROCEDURE — C1769 GUIDE WIRE: HCPCS | Performed by: UROLOGY

## 2024-06-05 DEVICE — IMAJIN™ DOUBLE LOOP URETERAL STENT SILICONE HYDRO-COATED OPEN/OPEN CH FR 07 LENGTH 28 CM WITH STEERABLE PUSHER
Type: IMPLANTABLE DEVICE | Site: URETER | Status: NON-FUNCTIONAL
Brand: PORGES COLOPLAST
Removed: 2024-06-19

## 2024-06-05 RX ORDER — ONDANSETRON 2 MG/ML
4 INJECTION INTRAMUSCULAR; INTRAVENOUS EVERY 30 MIN PRN
Status: DISCONTINUED | OUTPATIENT
Start: 2024-06-05 | End: 2024-06-05 | Stop reason: HOSPADM

## 2024-06-05 RX ORDER — FENTANYL CITRATE 50 UG/ML
INJECTION, SOLUTION INTRAMUSCULAR; INTRAVENOUS PRN
Status: DISCONTINUED | OUTPATIENT
Start: 2024-06-05 | End: 2024-06-05

## 2024-06-05 RX ORDER — FENTANYL CITRATE 50 UG/ML
25 INJECTION, SOLUTION INTRAMUSCULAR; INTRAVENOUS EVERY 5 MIN PRN
Status: DISCONTINUED | OUTPATIENT
Start: 2024-06-05 | End: 2024-06-05 | Stop reason: HOSPADM

## 2024-06-05 RX ORDER — SENNA AND DOCUSATE SODIUM 50; 8.6 MG/1; MG/1
1 TABLET, FILM COATED ORAL
COMMUNITY
Start: 2024-06-05

## 2024-06-05 RX ORDER — OXYCODONE HYDROCHLORIDE 5 MG/1
5 TABLET ORAL EVERY 6 HOURS PRN
Qty: 12 TABLET | Refills: 0 | Status: SHIPPED | OUTPATIENT
Start: 2024-06-05 | End: 2024-06-08

## 2024-06-05 RX ORDER — OXYCODONE HYDROCHLORIDE 5 MG/1
5 TABLET ORAL EVERY 6 HOURS PRN
Qty: 12 TABLET | Refills: 0 | Status: SHIPPED | OUTPATIENT
Start: 2024-06-05 | End: 2024-06-05

## 2024-06-05 RX ORDER — IBUPROFEN 200 MG
200 TABLET ORAL EVERY 4 HOURS PRN
COMMUNITY

## 2024-06-05 RX ORDER — EPHEDRINE SULFATE 50 MG/ML
INJECTION, SOLUTION INTRAMUSCULAR; INTRAVENOUS; SUBCUTANEOUS PRN
Status: DISCONTINUED | OUTPATIENT
Start: 2024-06-05 | End: 2024-06-05

## 2024-06-05 RX ORDER — KETOROLAC TROMETHAMINE 15 MG/ML
15 INJECTION, SOLUTION INTRAMUSCULAR; INTRAVENOUS ONCE
Status: COMPLETED | OUTPATIENT
Start: 2024-06-05 | End: 2024-06-05

## 2024-06-05 RX ORDER — SODIUM CHLORIDE, SODIUM LACTATE, POTASSIUM CHLORIDE, CALCIUM CHLORIDE 600; 310; 30; 20 MG/100ML; MG/100ML; MG/100ML; MG/100ML
INJECTION, SOLUTION INTRAVENOUS CONTINUOUS PRN
Status: DISCONTINUED | OUTPATIENT
Start: 2024-06-05 | End: 2024-06-05

## 2024-06-05 RX ORDER — OXYBUTYNIN CHLORIDE 5 MG/1
5 TABLET ORAL 3 TIMES DAILY
Qty: 60 TABLET | Refills: 0 | Status: SHIPPED | OUTPATIENT
Start: 2024-06-05 | End: 2024-06-05

## 2024-06-05 RX ORDER — PHENAZOPYRIDINE HYDROCHLORIDE 100 MG/1
100 TABLET, FILM COATED ORAL 3 TIMES DAILY PRN
Qty: 15 TABLET | Refills: 0 | Status: SHIPPED | OUTPATIENT
Start: 2024-06-05 | End: 2024-06-05

## 2024-06-05 RX ORDER — ONDANSETRON 4 MG/1
4 TABLET, ORALLY DISINTEGRATING ORAL EVERY 30 MIN PRN
Status: DISCONTINUED | OUTPATIENT
Start: 2024-06-05 | End: 2024-06-05 | Stop reason: HOSPADM

## 2024-06-05 RX ORDER — PHENAZOPYRIDINE HYDROCHLORIDE 100 MG/1
100 TABLET, FILM COATED ORAL 3 TIMES DAILY PRN
Qty: 15 TABLET | Refills: 0 | Status: ON HOLD | OUTPATIENT
Start: 2024-06-05 | End: 2024-06-19

## 2024-06-05 RX ORDER — CEFAZOLIN SODIUM/WATER 2 G/20 ML
2 SYRINGE (ML) INTRAVENOUS SEE ADMIN INSTRUCTIONS
Status: DISCONTINUED | OUTPATIENT
Start: 2024-06-05 | End: 2024-06-05 | Stop reason: HOSPADM

## 2024-06-05 RX ORDER — SODIUM CHLORIDE, SODIUM LACTATE, POTASSIUM CHLORIDE, CALCIUM CHLORIDE 600; 310; 30; 20 MG/100ML; MG/100ML; MG/100ML; MG/100ML
INJECTION, SOLUTION INTRAVENOUS CONTINUOUS
Status: DISCONTINUED | OUTPATIENT
Start: 2024-06-05 | End: 2024-06-05 | Stop reason: HOSPADM

## 2024-06-05 RX ORDER — OXYCODONE HYDROCHLORIDE 5 MG/1
5 TABLET ORAL ONCE
Status: COMPLETED | OUTPATIENT
Start: 2024-06-05 | End: 2024-06-05

## 2024-06-05 RX ORDER — HYDROMORPHONE HYDROCHLORIDE 1 MG/ML
0.4 INJECTION, SOLUTION INTRAMUSCULAR; INTRAVENOUS; SUBCUTANEOUS EVERY 5 MIN PRN
Status: DISCONTINUED | OUTPATIENT
Start: 2024-06-05 | End: 2024-06-05 | Stop reason: HOSPADM

## 2024-06-05 RX ORDER — HYDROMORPHONE HYDROCHLORIDE 1 MG/ML
0.2 INJECTION, SOLUTION INTRAMUSCULAR; INTRAVENOUS; SUBCUTANEOUS EVERY 5 MIN PRN
Status: DISCONTINUED | OUTPATIENT
Start: 2024-06-05 | End: 2024-06-05 | Stop reason: HOSPADM

## 2024-06-05 RX ORDER — PROPOFOL 10 MG/ML
INJECTION, EMULSION INTRAVENOUS PRN
Status: DISCONTINUED | OUTPATIENT
Start: 2024-06-05 | End: 2024-06-05

## 2024-06-05 RX ORDER — DEXAMETHASONE SODIUM PHOSPHATE 4 MG/ML
INJECTION, SOLUTION INTRA-ARTICULAR; INTRALESIONAL; INTRAMUSCULAR; INTRAVENOUS; SOFT TISSUE PRN
Status: DISCONTINUED | OUTPATIENT
Start: 2024-06-05 | End: 2024-06-05

## 2024-06-05 RX ORDER — CEFAZOLIN SODIUM/WATER 2 G/20 ML
2 SYRINGE (ML) INTRAVENOUS
Status: COMPLETED | OUTPATIENT
Start: 2024-06-05 | End: 2024-06-05

## 2024-06-05 RX ORDER — NALOXONE HYDROCHLORIDE 0.4 MG/ML
0.1 INJECTION, SOLUTION INTRAMUSCULAR; INTRAVENOUS; SUBCUTANEOUS
Status: DISCONTINUED | OUTPATIENT
Start: 2024-06-05 | End: 2024-06-05 | Stop reason: HOSPADM

## 2024-06-05 RX ORDER — ONDANSETRON 4 MG/1
4 TABLET, FILM COATED ORAL EVERY 8 HOURS PRN
COMMUNITY

## 2024-06-05 RX ORDER — TOLTERODINE 2 MG/1
2 CAPSULE, EXTENDED RELEASE ORAL DAILY PRN
Qty: 7 CAPSULE | Refills: 0 | Status: ON HOLD | OUTPATIENT
Start: 2024-06-05 | End: 2024-06-19

## 2024-06-05 RX ORDER — ONDANSETRON 2 MG/ML
INJECTION INTRAMUSCULAR; INTRAVENOUS PRN
Status: DISCONTINUED | OUTPATIENT
Start: 2024-06-05 | End: 2024-06-05

## 2024-06-05 RX ORDER — LIDOCAINE HYDROCHLORIDE 20 MG/ML
INJECTION, SOLUTION INFILTRATION; PERINEURAL PRN
Status: DISCONTINUED | OUTPATIENT
Start: 2024-06-05 | End: 2024-06-05

## 2024-06-05 RX ORDER — DEXAMETHASONE SODIUM PHOSPHATE 4 MG/ML
4 INJECTION, SOLUTION INTRA-ARTICULAR; INTRALESIONAL; INTRAMUSCULAR; INTRAVENOUS; SOFT TISSUE
Status: DISCONTINUED | OUTPATIENT
Start: 2024-06-05 | End: 2024-06-05 | Stop reason: HOSPADM

## 2024-06-05 RX ORDER — TAMSULOSIN HYDROCHLORIDE 0.4 MG/1
0.4 CAPSULE ORAL DAILY
Qty: 7 CAPSULE | Refills: 0 | Status: ON HOLD | OUTPATIENT
Start: 2024-06-05 | End: 2024-06-19

## 2024-06-05 RX ORDER — FENTANYL CITRATE 50 UG/ML
50 INJECTION, SOLUTION INTRAMUSCULAR; INTRAVENOUS EVERY 5 MIN PRN
Status: DISCONTINUED | OUTPATIENT
Start: 2024-06-05 | End: 2024-06-05 | Stop reason: HOSPADM

## 2024-06-05 RX ORDER — LIDOCAINE HYDROCHLORIDE 20 MG/ML
JELLY TOPICAL PRN
Status: DISCONTINUED | OUTPATIENT
Start: 2024-06-05 | End: 2024-06-05 | Stop reason: HOSPADM

## 2024-06-05 RX ORDER — TAMSULOSIN HYDROCHLORIDE 0.4 MG/1
0.4 CAPSULE ORAL DAILY
Qty: 20 CAPSULE | Refills: 0 | Status: SHIPPED | OUTPATIENT
Start: 2024-06-05 | End: 2024-06-05

## 2024-06-05 RX ADMIN — SODIUM CHLORIDE, POTASSIUM CHLORIDE, SODIUM LACTATE AND CALCIUM CHLORIDE: 600; 310; 30; 20 INJECTION, SOLUTION INTRAVENOUS at 15:55

## 2024-06-05 RX ADMIN — EPHEDRINE SULFATE 5 MG: 5 INJECTION INTRAVENOUS at 17:04

## 2024-06-05 RX ADMIN — EPHEDRINE SULFATE 5 MG: 5 INJECTION INTRAVENOUS at 16:52

## 2024-06-05 RX ADMIN — PHENYLEPHRINE HYDROCHLORIDE 150 MCG: 10 INJECTION INTRAVENOUS at 16:15

## 2024-06-05 RX ADMIN — PHENYLEPHRINE HYDROCHLORIDE 100 MCG: 10 INJECTION INTRAVENOUS at 16:21

## 2024-06-05 RX ADMIN — PHENYLEPHRINE HYDROCHLORIDE 100 MCG: 10 INJECTION INTRAVENOUS at 16:25

## 2024-06-05 RX ADMIN — EPHEDRINE SULFATE 10 MG: 5 INJECTION INTRAVENOUS at 16:31

## 2024-06-05 RX ADMIN — FENTANYL CITRATE 100 MCG: 50 INJECTION INTRAMUSCULAR; INTRAVENOUS at 16:10

## 2024-06-05 RX ADMIN — DEXAMETHASONE SODIUM PHOSPHATE 8 MG: 4 INJECTION, SOLUTION INTRA-ARTICULAR; INTRALESIONAL; INTRAMUSCULAR; INTRAVENOUS; SOFT TISSUE at 16:21

## 2024-06-05 RX ADMIN — PHENYLEPHRINE HYDROCHLORIDE 100 MCG: 10 INJECTION INTRAVENOUS at 16:30

## 2024-06-05 RX ADMIN — ONDANSETRON 4 MG: 2 INJECTION INTRAMUSCULAR; INTRAVENOUS at 17:11

## 2024-06-05 RX ADMIN — Medication 2 G: at 16:01

## 2024-06-05 RX ADMIN — LIDOCAINE HYDROCHLORIDE 100 MG: 20 INJECTION, SOLUTION INFILTRATION; PERINEURAL at 16:10

## 2024-06-05 RX ADMIN — PHENYLEPHRINE HYDROCHLORIDE 0.3 MCG/KG/MIN: 10 INJECTION INTRAVENOUS at 16:29

## 2024-06-05 RX ADMIN — KETOROLAC TROMETHAMINE 15 MG: 15 INJECTION, SOLUTION INTRAMUSCULAR; INTRAVENOUS at 18:05

## 2024-06-05 RX ADMIN — PHENYLEPHRINE HYDROCHLORIDE 100 MCG: 10 INJECTION INTRAVENOUS at 16:10

## 2024-06-05 RX ADMIN — PROPOFOL 200 MG: 10 INJECTION, EMULSION INTRAVENOUS at 16:10

## 2024-06-05 RX ADMIN — OXYCODONE HYDROCHLORIDE 5 MG: 5 TABLET ORAL at 14:55

## 2024-06-05 RX ADMIN — MIDAZOLAM 2 MG: 1 INJECTION INTRAMUSCULAR; INTRAVENOUS at 15:55

## 2024-06-05 ASSESSMENT — ACTIVITIES OF DAILY LIVING (ADL)
ADLS_ACUITY_SCORE: 31
ADLS_ACUITY_SCORE: 29
ADLS_ACUITY_SCORE: 31

## 2024-06-05 NOTE — ANESTHESIA POSTPROCEDURE EVALUATION
Patient: Freeman Velazquez    Procedure: Procedure(s):  Left Ureteroscopy, Retrograde Pyelogram, Bilateral Ureteral Stent Insertion       Anesthesia Type:  General    Note:  Disposition: Outpatient   Postop Pain Control: Uneventful            Sign Out: Well controlled pain   PONV: No   Neuro/Psych: Uneventful            Sign Out: Acceptable/Baseline neuro status   Airway/Respiratory: Uneventful            Sign Out: Acceptable/Baseline resp. status   CV/Hemodynamics: Uneventful            Sign Out: Acceptable CV status; No obvious hypovolemia; No obvious fluid overload   Other NRE: NONE   DID A NON-ROUTINE EVENT OCCUR? No           Last vitals:  Vitals Value Taken Time   /80 06/05/24 1745   Temp 37.1  C (98.8  F) 06/05/24 1722   Pulse 92 06/05/24 1745   Resp 12 06/05/24 1745   SpO2 96 % 06/05/24 1745       Electronically Signed By: Stefanie Shaw MD  June 5, 2024  5:49 PM

## 2024-06-05 NOTE — DISCHARGE INSTRUCTIONS
Stent/Ureteroscopy:    Activity  - There are no activity restrictions    Diet:  You may resume your regular diet.     Common symptoms related to the stent:  - You will likely notice some blood in the urine for as long as the stent is in place. You may also notice more blood in the urine after physical activity. Normal urine color can range from yellow to dark red. This is not problematic as long as you are able to empty your bladder. Although urine may seem quite bloody, a few drops of blood can color the entire toilet bowl red- much like food coloring. Increase your fluid intake to help flush the blood out of your bladder.   - You may also notice a twinge of pain in your kidney during urination. This can be severe, but should get better after the first few days with the stent. This is due to urine traveling backward (up the stent into your kidney) when the bladder squeezes. It is normal and not a cause for concern.  - You may feel like you need to urinate more frequently than usual. Some patients experience a lower abdominal cramping or  spasm . You may notice urine leakage from your urethra after this happens. This is due to the stent rubbing against your bladder wall and should get better over the next few days. Additionally, your doctor may prescribe a medication to help with this called  oxybutynin.   - You may experience some burning with urination due to minor trauma from the scope used during your surgery. This should disappear over the next few days.     Medications:  Anti-inflammatories/NSAIDs (Ibuprofen, Advil, Aleve) are the most effective pain relievers for stent-related discomfort. You may safely use these in combination with Tylenol.   You may also take Tylenol for pain control- but not more than 3000 mg daily.  Depending on your procedure, you may be given a limited supply of narcotic pain medications that you should use sparingly. These are typically not as effective as NSAIDs. These medications  "causes constipation so make sure to take a stool softener along with it. Do not drive while under the influence of narcotic pain medications.  Flomax (Tamsulosin): This is a medication used to relax the ureter. It should help with flank pain associated with the stent. Take 0.4 mg (1 pill) every day.  Oxybutynin (Ditropan): This is a medication used to treat overactive bladder/bladder spasms related to the stent. You may take up to 5 mg three times a day. This medication causes constipation so make sure to take a stool softener along with it.   Pyridium: This is a bladder anesthetic that can help with urinary burning. It will turn your urine bright orange. You may take 100 mg up to three times daily, but limit using this to three days maximum if possible.   We recommend over the counter fiber (metamucil or benefiber) and stool softeners (miralax, docusate or senna) to prevent postoperative constipation, but stop if you develop diarrhea.    Follow-Up:  - Follow up with Dr. Cheung for definitive stone surgery, we will call to arrnage this.   - Call or return sooner than your regularly scheduled visit if you develop any of the following: fever (greater than 101.5), uncontrolled pain, uncontrolled nausea or vomiting, or inability to urinate.    Phone numbers:   - Monday through Friday 8am to 4:30pm: Call [phone] with questions, requests for medication refills, or to schedule or confirm an appointment.  - Nights or weekends: call the after hours emergency pager - [phone] and tell the  \"I would like to page the Urology Resident on call.\" Please note, due to prescribing laws, resident physicians are unable to prescribe narcotics after-hours. If you feel as though you will need a refill of a narcotic pain medication, you will need to call the clinic during business hours OR seek emergency care.  - For emergencies, call [phone]    "

## 2024-06-05 NOTE — BRIEF OP NOTE
[facility]    Brief Operative Note    Pre-operative diagnosis: Ureterolithiasis [N20.1]  Nephrolithiasis [N20.0]  Post-operative diagnosis Same as pre-operative diagnosis    Procedure: Left Ureteroscopy, Retrograde Pyelogram, Bilateral Ureteral Stent Insertion, Bilateral - Ureter    Surgeon: Surgeons and Role:     * Umair Cheung MD - Primary     * Favian Fermin MD - Resident - Assisting  Anesthesia: * No anesthesia type entered *   Estimated Blood Loss: Less than 10 ml    Drains:  Bilateral 7f* 28cm stents  Specimens: * No specimens in log *  Findings:   Tight ureters bilaterally, unable to access stones, stented .  Complications: None.  Implants:   Implant Name Type Inv. Item Serial No.  Lot No. LRB No. Used Action   KIT STENT IMAJIN COLOPLAST 7FRX 28CM [id number] - [id number] Stent KIT STENT IMAJIN COLOPLAST 7FRX 28CM [id number]  COLOPLAST [id number] Left 1 Implanted   KIT STENT IMAJIN COLOPLAST 7FRX 28CM [id number] - [id number] Stent KIT STENT IMAJIN COLOPLAST 7FRX 28CM [id number]  COLOPLAST [id number] Right 1 Implanted

## 2024-06-05 NOTE — OP NOTE
PREOPERATIVE DIAGNOSIS: bilateral nephrolithiasis        POSTOPERATIVE DIAGNOSIS: same    PROCEDURES PERFORMED:   1. Cystourethroscopy.   2. bilateral ureteroscopy  3. Bilateral Retrograde pyelogram.  4. Intraoperative interpretation of fluoroscopic imaging.   5. Bilateral ureteral stent placement    STAFF SURGEON: Umair Cheung MD    ASSISTANT: Favian Fermin MD (PGY-2)    ANESTHESIA: LMA    ESTIMATED BLOOD LOSS: 5 mL.     IV FLUIDS: see anesthesia record    COMPLICATIONS: None.     SIGNIFICANT FINDINGS: Good curl in renal pelvis and bladder new stent.     BRIEF OPERATIVE INDICATIONS: Freeman Velazquez is a 64 year old male with left 1 cm UPJ stone nephrolithiasis.    DESCRIPTION OF PROCEDURE:  After full informed voluntary consent was obtained, the patient was transported to the operating room, placed supine on the table. After adequate anesthesia was induced, they were prepped and draped in the usual sterile fashion. A timeout was taken to confirm correct patient, procedure and laterality.     The case was started by inserting a 22-Lao rigid cystoscope sheath and 30-degree angle lens. The urethra was unremarkable, the prostate was 4 cm and mildly obstructing with a minor median lobe. Once in the urinary bladder, media was clear.  There were no tumors, stones or diverticuli appreciated, and the bilateral ureteral orifices were in their normal orthotopic positions.  A 5 fr open ended ureteral catheter was advanced into the right ureteral orifice, and a retrograde pyelogram was performed which demonstrated no hydronephrosis. These steps were repeated on the left side. Next, the guidewire was placed up into the renal pelvis and passed easily. We attempted to pass a 10f dual lumen up the left ureter, which was too tight in the distal ureter. We were able to enter the left ureter with the semirigid ureteroscope past the initial area of blockage, but met resistance below the level of the iliac vessels.    On the right  side, I was able to advance the to the mid ureter.  Retrograde was unremarkable.  I tried advancing the fiberoptic ureteroscope in the right ureter, but there was resistance at the distal ureter with some narrowing.      I then used a 5 fr catheter to measure each ureter and they were both 28 cm.  Retrograde on the left showed mild hydronephrosis.      Bilateral 7 fr x 28 cm Imajin hydro stents were placed with good proximal and distal curls.    The bladder was then drained.  Patient tolerated the procedure well.  No apparent complications. He was transported to the postanesthesia care unit in stable condition.     PLAN: Follow up for definitive stone surgery in the coming weeks    I, Umair Cheung, was present for the entire case on 06/05/24.

## 2024-06-05 NOTE — ANESTHESIA CARE TRANSFER NOTE
Patient: Freeman Velazquez    Procedure: Procedure(s):  Left Ureteroscopy, Retrograde Pyelogram, Bilateral Ureteral Stent Insertion       Diagnosis: Ureterolithiasis [N20.1]  Nephrolithiasis [N20.0]  Diagnosis Additional Information: No value filed.    Anesthesia Type:   General     Note:    Oropharynx: spontaneously breathing  Level of Consciousness: awake  Oxygen Supplementation: face mask  Level of Supplemental Oxygen (L/min / FiO2): 8  Independent Airway: airway patency satisfactory and stable  Dentition: dentition unchanged  Vital Signs Stable: post-procedure vital signs reviewed and stable  Report to RN Given: handoff report given  Patient transferred to: PACU    Handoff Report: Identifed the Patient, Identified the Reponsible Provider, Reviewed the pertinent medical history, Discussed the surgical course, Reviewed Intra-OP anesthesia mangement and issues during anesthesia, Set expectations for post-procedure period and Allowed opportunity for questions and acknowledgement of understanding      Vitals:  Vitals Value Taken Time   /68 06/05/24 1722   Temp     Pulse 77 06/05/24 1724   Resp 12 06/05/24 1724   SpO2 100 % 06/05/24 1724   Vitals shown include unfiled device data.    Electronically Signed By: MARK Malik CRNA  June 5, 2024  5:26 PM

## 2024-06-05 NOTE — ANESTHESIA PROCEDURE NOTES
Airway       Patient location during procedure: OR  Staff -        Anesthesiologist:  Stefanie Caceres MD       CRNA: Tin Parham APRN CRNA       Performed By: CRNA  Consent for Airway        Urgency: elective  Indications and Patient Condition       Indications for airway management: maegan-procedural       Induction type:intravenous       Mask difficulty assessment: 1 - vent by mask    Final Airway Details       Final airway type: supraglottic airway    Supraglottic Airway Details        Type: LMA       Brand: Air-Q       LMA size: 5    Post intubation assessment        Placement verified by: capnometry, equal breath sounds and chest rise        Number of attempts at approach: 1       Number of other approaches attempted: 0       Secured with: tape       Ease of procedure: easy       Dentition: Intact and Unchanged

## 2024-06-05 NOTE — ANESTHESIA PREPROCEDURE EVALUATION
Anesthesia Pre-Procedure Evaluation    Patient: Freeman Velazquez   MRN: [id number] : 1960        Procedure : Procedure(s):  Cystoscopy, retrograde pyelogram, ureteroscopy with laser lithotripsy, steerable vacuum-assisted suction, ureteral stent placement          Past Medical History:   Diagnosis Date    Arthritis     Distal radius fracture 2011    GERD (gastroesophageal reflux disease)     Kidney stone     Renal colic       Past Surgical History:   Procedure Laterality Date    ARTHROSCOPY KNEE BILATERAL      COLONOSCOPY  2013    Procedure: COLONOSCOPY;;  Surgeon: Lewis Metcalf MD;  Location: [facility] OR    COLONOSCOPY N/A 2019    Procedure: Combined Colonoscopy, Single Or Multiple Biopsy/Polypectomy By Biopsy;  Surgeon: Javier Judge MD;  Location: [facility] OR    COLONOSCOPY N/A 2021    Procedure: Colonoscopy, With Polypectomy And Biopsy;  Surgeon: Sasha Martinez DO;  Location: [facility] OR    COLONOSCOPY N/A 2024    Procedure: Colonoscopy;  Surgeon: Shemar Núñez MD;  Location: [facility] OR    COLONOSCOPY WITH CO2 INSUFFLATION N/A 2019    Procedure: COLONOSCOPY WITH CO2 INSUFFLATION;  Surgeon: Javier Judge MD;  Location: [facility] OR    COLONOSCOPY WITH CO2 INSUFFLATION N/A 2021    Procedure: COLONOSCOPY, WITH CO2 INSUFFLATION;  Surgeon: Cedrick Sheikh DO;  Location: [facility] OR    COLONOSCOPY WITH CO2 INSUFFLATION N/A 2021    Procedure: COLONOSCOPY, WITH CO2 INSUFFLATION;  Surgeon: Sasha Martinez DO;  Location: [facility] OR    HC CYSTOSCOPY,INSERT URETERAL STENT Right 2020    Procedure: CYSTOSCOPY, WITH FLEXIBLE URETEROSCOPY, RETROGRADES, AND STENT INSERTION RIGHT;  Surgeon: Wil Alejo MD;  Location: [facility];  Service: Urology    KNEE SURGERY Bilateral       Allergies   Allergen Reactions    Tetracycline Shortness Of Breath    Erythromycin       Social History     Tobacco Use    Smoking status: Never    Smokeless tobacco: Never   Substance Use Topics  "   Alcohol use: Yes     Comment: rare, one drink in 6 months      Wt Readings from Last 1 Encounters:   06/05/24 86.2 kg (190 lb)        Anesthesia Evaluation   Pt has had prior anesthetic. Type: MAC and General.        ROS/MED HX  ENT/Pulmonary:  - neg pulmonary ROS     Neurologic:  - neg neurologic ROS     Cardiovascular:  - neg cardiovascular ROS     METS/Exercise Tolerance:     Hematologic:  - neg hematologic  ROS     Musculoskeletal:  - neg musculoskeletal ROS (+)  arthritis,             GI/Hepatic: Comment: Has had esophageal dilation in the past - neg GI/hepatic ROS   (+) GERD,  esophageal disease, Stricture,    bowel prep,            Renal/Genitourinary:  - neg Renal ROS     Endo:  - neg endo ROS     Psychiatric/Substance Use:  - neg psychiatric ROS     Infectious Disease:  - neg infectious disease ROS     Malignancy:  - neg malignancy ROS     Other:  - neg other ROS          Physical Exam    Airway        Mallampati: II   TM distance: > 3 FB   Neck ROM: full   Mouth opening: > 3 cm    Respiratory Devices and Support         Dental       (+) Edentulous      Cardiovascular   cardiovascular exam normal          Pulmonary   pulmonary exam normal        breath sounds clear to auscultation           OUTSIDE LABS:  CBC:   Lab Results   Component Value Date    WBC 5.6 06/03/2024    WBC 8.3 05/20/2024    HGB 14.8 06/03/2024    HGB 15.2 05/20/2024    HCT 44.8 06/03/2024    HCT 46.8 05/20/2024     06/03/2024     05/20/2024     BMP:   Lab Results   Component Value Date     06/03/2024     05/20/2024    POTASSIUM 4.0 06/03/2024    POTASSIUM 4.0 05/20/2024    CHLORIDE 103 06/03/2024    CHLORIDE 104 05/20/2024    CO2 27 06/03/2024    CO2 27 05/20/2024    BUN 17.4 06/03/2024    BUN 16.0 05/20/2024    CR 1.23 (H) 06/03/2024    CR 1.08 05/20/2024     (H) 06/03/2024     (H) 05/20/2024     COAGS: No results found for: \"PTT\", \"INR\", \"FIBR\"  POC: No results found for: \"BGM\", \"HCG\", " "\"HCGS\"  HEPATIC:   Lab Results   Component Value Date    ALBUMIN 4.1 03/08/2021    PROTTOTAL 7.7 03/08/2021    ALT 32 03/08/2021    AST 23 03/08/2021    ALKPHOS 103 03/08/2021    BILITOTAL 0.9 03/08/2021     OTHER:   Lab Results   Component Value Date    MARIAA 8.7 (L) 06/03/2024    LIPASE 84 03/08/2021    AMYLASE 44 01/04/2018    TSH 0.66 03/08/2021       Anesthesia Plan    ASA Status:  2       Anesthesia Type: General.     - Airway: LMA   Induction: Intravenous, Propofol.   Maintenance: Balanced.        Consents    Anesthesia Plan(s) and associated risks, benefits, and realistic alternatives discussed. Questions answered and patient/representative(s) expressed understanding.     - Discussed:     - Discussed with:  Patient      - Extended Intubation/Ventilatory Support Discussed: No.      - Patient is DNR/DNI Status: No     Use of blood products discussed: No .     Postoperative Care       PONV prophylaxis: Ondansetron (or other 5HT-3), Dexamethasone or Solumedrol     Comments:               Stefanie Shaw MD    I have reviewed the pertinent notes and labs in the chart from the past 30 days and (re)examined the patient.  Any updates or changes from those notes are reflected in this note.                  "

## 2024-06-10 ENCOUNTER — TELEPHONE (OUTPATIENT)
Dept: UROLOGY | Facility: CLINIC | Age: 64
End: 2024-06-10
Payer: MEDICARE

## 2024-06-10 NOTE — TELEPHONE ENCOUNTER
Secondary URS    Spoke with: Patient       Date of surgery: Wednesday June 19 2024 with Dr Cheung       Location: [place]       Informed patient they will need a adult : YES      Pre op with provider:  Recent OR Dr Cheung will update DOS      H&P Scheduled in PAC- NA        Pre procedure covid :Not req      Additional imaging: NA        Surgery Packet : Sent via mymxlog      Additional comments: Please call for surgery teaching

## 2024-06-18 ENCOUNTER — TELEPHONE (OUTPATIENT)
Dept: UROLOGY | Facility: CLINIC | Age: 64
End: 2024-06-18
Payer: MEDICARE

## 2024-06-18 RX ORDER — DIMENHYDRINATE 50 MG
50 TABLET ORAL
COMMUNITY

## 2024-06-18 NOTE — TELEPHONE ENCOUNTER
[facility] Call Center    Phone Message    May a detailed message be left on voicemail: yes     Reason for Call: Other: Patient calls after speaking with Pre-surgical nurse and has questions about medications wondering if he should still be taking them. Patient mentions multiple medications, including, Ondanestron, Tamsulosin, medication for bladder spasms, as well as a couple of others that patient had been prescribed for a short time but is no longer taking.     Action Taken: Message routed to:  Other: Urology    Travel Screening: Not Applicable

## 2024-06-18 NOTE — TELEPHONE ENCOUNTER
Spoke with pt, he not been talking these medications for over a week. Advised Dr. Cheung can fill any medications needed post-op after his procedure tomorrow. Pt has no additional questions.     VIDHI Glover  Care Coordinator- Urology   [phone]

## 2024-06-19 ENCOUNTER — HOSPITAL ENCOUNTER (OUTPATIENT)
Facility: CLINIC | Age: 64
Discharge: HOME | End: 2024-06-19
Attending: UROLOGY | Admitting: UROLOGY
Payer: MEDICARE

## 2024-06-19 ENCOUNTER — ANESTHESIA (OUTPATIENT)
Dept: SURGERY | Facility: CLINIC | Age: 64
End: 2024-06-19
Payer: MEDICARE

## 2024-06-19 ENCOUNTER — ANESTHESIA EVENT (OUTPATIENT)
Dept: SURGERY | Facility: CLINIC | Age: 64
End: 2024-06-19
Payer: MEDICARE

## 2024-06-19 ENCOUNTER — APPOINTMENT (OUTPATIENT)
Dept: GENERAL RADIOLOGY | Facility: CLINIC | Age: 64
End: 2024-06-19
Attending: UROLOGY
Payer: MEDICARE

## 2024-06-19 VITALS
BODY MASS INDEX: 24.47 KG/M2 | HEART RATE: 85 BPM | DIASTOLIC BLOOD PRESSURE: 84 MMHG | WEIGHT: 190.7 LBS | OXYGEN SATURATION: 96 % | HEIGHT: 74 IN | SYSTOLIC BLOOD PRESSURE: 123 MMHG | TEMPERATURE: 98.1 F | RESPIRATION RATE: 18 BRPM

## 2024-06-19 DIAGNOSIS — N20.0 CALCULUS OF KIDNEY: ICD-10-CM

## 2024-06-19 PROCEDURE — 250N000025 HC SEVOFLURANE, PER MIN: Performed by: UROLOGY

## 2024-06-19 PROCEDURE — 250N000011 HC RX IP 250 OP 636: Performed by: UROLOGY

## 2024-06-19 PROCEDURE — 250N000013 HC RX MED GY IP 250 OP 250 PS 637: Performed by: ANESTHESIOLOGY

## 2024-06-19 PROCEDURE — 999N000141 HC STATISTIC PRE-PROCEDURE NURSING ASSESSMENT: Performed by: UROLOGY

## 2024-06-19 PROCEDURE — 710N000010 HC RECOVERY PHASE 1, LEVEL 2, PER MIN: Performed by: UROLOGY

## 2024-06-19 PROCEDURE — 999N000180 XR SURGERY CARM FLUORO LESS THAN 5 MIN: Mod: TC

## 2024-06-19 PROCEDURE — C2617 STENT, NON-COR, TEM W/O DEL: HCPCS | Performed by: UROLOGY

## 2024-06-19 PROCEDURE — 255N000002 HC RX 255 OP 636: Performed by: UROLOGY

## 2024-06-19 PROCEDURE — 250N000011 HC RX IP 250 OP 636: Mod: JZ | Performed by: NURSE ANESTHETIST, CERTIFIED REGISTERED

## 2024-06-19 PROCEDURE — 272N000001 HC OR GENERAL SUPPLY STERILE: Performed by: UROLOGY

## 2024-06-19 PROCEDURE — 370N000017 HC ANESTHESIA TECHNICAL FEE, PER MIN: Performed by: UROLOGY

## 2024-06-19 PROCEDURE — 74420 UROGRAPHY RTRGR +-KUB: CPT | Mod: 26 | Performed by: UROLOGY

## 2024-06-19 PROCEDURE — 258N000003 HC RX IP 258 OP 636: Mod: JZ | Performed by: NURSE ANESTHETIST, CERTIFIED REGISTERED

## 2024-06-19 PROCEDURE — 710N000012 HC RECOVERY PHASE 2, PER MINUTE: Performed by: UROLOGY

## 2024-06-19 PROCEDURE — 360N000084 HC SURGERY LEVEL 4 W/ FLUORO, PER MIN: Performed by: UROLOGY

## 2024-06-19 PROCEDURE — 52356 CYSTO/URETERO W/LITHOTRIPSY: CPT | Mod: 22 | Performed by: UROLOGY

## 2024-06-19 PROCEDURE — 82365 CALCULUS SPECTROSCOPY: CPT | Performed by: UROLOGY

## 2024-06-19 PROCEDURE — 52352 CYSTOURETERO W/STONE REMOVE: CPT | Mod: RT | Performed by: UROLOGY

## 2024-06-19 PROCEDURE — 250N000011 HC RX IP 250 OP 636: Mod: JZ | Performed by: ANESTHESIOLOGY

## 2024-06-19 PROCEDURE — 250N000009 HC RX 250: Performed by: UROLOGY

## 2024-06-19 PROCEDURE — C1758 CATHETER, URETERAL: HCPCS | Performed by: UROLOGY

## 2024-06-19 PROCEDURE — C1894 INTRO/SHEATH, NON-LASER: HCPCS | Performed by: UROLOGY

## 2024-06-19 PROCEDURE — 52356 CYSTO/URETERO W/LITHOTRIPSY: CPT | Performed by: ANESTHESIOLOGY

## 2024-06-19 PROCEDURE — 250N000009 HC RX 250: Performed by: NURSE ANESTHETIST, CERTIFIED REGISTERED

## 2024-06-19 PROCEDURE — C1769 GUIDE WIRE: HCPCS | Performed by: UROLOGY

## 2024-06-19 PROCEDURE — 52356 CYSTO/URETERO W/LITHOTRIPSY: CPT | Performed by: NURSE ANESTHETIST, CERTIFIED REGISTERED

## 2024-06-19 DEVICE — BLACK SILICONE FILIFORM DOUBLE PIGTAIL URETERAL STENT SET
Type: IMPLANTABLE DEVICE | Site: URETER | Status: FUNCTIONAL
Brand: COOK

## 2024-06-19 RX ORDER — PHENAZOPYRIDINE HYDROCHLORIDE 100 MG/1
100 TABLET, FILM COATED ORAL 3 TIMES DAILY PRN
Qty: 15 TABLET | Refills: 0 | Status: SHIPPED | OUTPATIENT
Start: 2024-06-19

## 2024-06-19 RX ORDER — SENNA AND DOCUSATE SODIUM 50; 8.6 MG/1; MG/1
1 TABLET, FILM COATED ORAL
COMMUNITY
Start: 2024-06-19

## 2024-06-19 RX ORDER — DEXAMETHASONE SODIUM PHOSPHATE 4 MG/ML
4 INJECTION, SOLUTION INTRA-ARTICULAR; INTRALESIONAL; INTRAMUSCULAR; INTRAVENOUS; SOFT TISSUE
Status: DISCONTINUED | OUTPATIENT
Start: 2024-06-19 | End: 2024-06-19 | Stop reason: HOSPADM

## 2024-06-19 RX ORDER — SODIUM CHLORIDE, SODIUM LACTATE, POTASSIUM CHLORIDE, CALCIUM CHLORIDE 600; 310; 30; 20 MG/100ML; MG/100ML; MG/100ML; MG/100ML
INJECTION, SOLUTION INTRAVENOUS CONTINUOUS PRN
Status: DISCONTINUED | OUTPATIENT
Start: 2024-06-19 | End: 2024-06-19

## 2024-06-19 RX ORDER — ONDANSETRON 2 MG/ML
INJECTION INTRAMUSCULAR; INTRAVENOUS PRN
Status: DISCONTINUED | OUTPATIENT
Start: 2024-06-19 | End: 2024-06-19

## 2024-06-19 RX ORDER — NALOXONE HYDROCHLORIDE 0.4 MG/ML
0.1 INJECTION, SOLUTION INTRAMUSCULAR; INTRAVENOUS; SUBCUTANEOUS
Status: DISCONTINUED | OUTPATIENT
Start: 2024-06-19 | End: 2024-06-19 | Stop reason: HOSPADM

## 2024-06-19 RX ORDER — FENTANYL CITRATE 50 UG/ML
INJECTION, SOLUTION INTRAMUSCULAR; INTRAVENOUS PRN
Status: DISCONTINUED | OUTPATIENT
Start: 2024-06-19 | End: 2024-06-19

## 2024-06-19 RX ORDER — OXYCODONE HYDROCHLORIDE 5 MG/1
5 TABLET ORAL
Status: COMPLETED | OUTPATIENT
Start: 2024-06-19 | End: 2024-06-19

## 2024-06-19 RX ORDER — ONDANSETRON 2 MG/ML
4 INJECTION INTRAMUSCULAR; INTRAVENOUS EVERY 30 MIN PRN
Status: DISCONTINUED | OUTPATIENT
Start: 2024-06-19 | End: 2024-06-19 | Stop reason: HOSPADM

## 2024-06-19 RX ORDER — DEXAMETHASONE SODIUM PHOSPHATE 4 MG/ML
INJECTION, SOLUTION INTRA-ARTICULAR; INTRALESIONAL; INTRAMUSCULAR; INTRAVENOUS; SOFT TISSUE PRN
Status: DISCONTINUED | OUTPATIENT
Start: 2024-06-19 | End: 2024-06-19

## 2024-06-19 RX ORDER — CEFAZOLIN SODIUM/WATER 2 G/20 ML
2 SYRINGE (ML) INTRAVENOUS SEE ADMIN INSTRUCTIONS
Status: DISCONTINUED | OUTPATIENT
Start: 2024-06-19 | End: 2024-06-19 | Stop reason: HOSPADM

## 2024-06-19 RX ORDER — HYDROMORPHONE HYDROCHLORIDE 1 MG/ML
0.4 INJECTION, SOLUTION INTRAMUSCULAR; INTRAVENOUS; SUBCUTANEOUS EVERY 5 MIN PRN
Status: DISCONTINUED | OUTPATIENT
Start: 2024-06-19 | End: 2024-06-19 | Stop reason: HOSPADM

## 2024-06-19 RX ORDER — OXYCODONE HYDROCHLORIDE 5 MG/1
10 TABLET ORAL
Status: DISCONTINUED | OUTPATIENT
Start: 2024-06-19 | End: 2024-06-19 | Stop reason: HOSPADM

## 2024-06-19 RX ORDER — HYDROMORPHONE HYDROCHLORIDE 1 MG/ML
0.2 INJECTION, SOLUTION INTRAMUSCULAR; INTRAVENOUS; SUBCUTANEOUS EVERY 5 MIN PRN
Status: DISCONTINUED | OUTPATIENT
Start: 2024-06-19 | End: 2024-06-19 | Stop reason: HOSPADM

## 2024-06-19 RX ORDER — LIDOCAINE HYDROCHLORIDE 20 MG/ML
INJECTION, SOLUTION INFILTRATION; PERINEURAL PRN
Status: DISCONTINUED | OUTPATIENT
Start: 2024-06-19 | End: 2024-06-19

## 2024-06-19 RX ORDER — LIDOCAINE HYDROCHLORIDE 20 MG/ML
JELLY TOPICAL PRN
Status: DISCONTINUED | OUTPATIENT
Start: 2024-06-19 | End: 2024-06-19 | Stop reason: HOSPADM

## 2024-06-19 RX ORDER — FENTANYL CITRATE 50 UG/ML
25 INJECTION, SOLUTION INTRAMUSCULAR; INTRAVENOUS EVERY 5 MIN PRN
Status: DISCONTINUED | OUTPATIENT
Start: 2024-06-19 | End: 2024-06-19 | Stop reason: HOSPADM

## 2024-06-19 RX ORDER — FENTANYL CITRATE 50 UG/ML
50 INJECTION, SOLUTION INTRAMUSCULAR; INTRAVENOUS EVERY 5 MIN PRN
Status: DISCONTINUED | OUTPATIENT
Start: 2024-06-19 | End: 2024-06-19 | Stop reason: HOSPADM

## 2024-06-19 RX ORDER — PROPOFOL 10 MG/ML
INJECTION, EMULSION INTRAVENOUS PRN
Status: DISCONTINUED | OUTPATIENT
Start: 2024-06-19 | End: 2024-06-19

## 2024-06-19 RX ORDER — OXYCODONE HYDROCHLORIDE 5 MG/1
5 TABLET ORAL EVERY 6 HOURS PRN
Qty: 12 TABLET | Refills: 0 | Status: SHIPPED | OUTPATIENT
Start: 2024-06-19 | End: 2024-06-22

## 2024-06-19 RX ORDER — TOLTERODINE 4 MG/1
4 CAPSULE, EXTENDED RELEASE ORAL DAILY PRN
Qty: 7 CAPSULE | Refills: 0 | Status: SHIPPED | OUTPATIENT
Start: 2024-06-19

## 2024-06-19 RX ORDER — ONDANSETRON 4 MG/1
4 TABLET, ORALLY DISINTEGRATING ORAL EVERY 30 MIN PRN
Status: DISCONTINUED | OUTPATIENT
Start: 2024-06-19 | End: 2024-06-19 | Stop reason: HOSPADM

## 2024-06-19 RX ORDER — ONDANSETRON 4 MG/1
4 TABLET, ORALLY DISINTEGRATING ORAL EVERY 8 HOURS PRN
Qty: 10 TABLET | Refills: 0 | Status: SHIPPED | OUTPATIENT
Start: 2024-06-19

## 2024-06-19 RX ORDER — SODIUM CHLORIDE, SODIUM LACTATE, POTASSIUM CHLORIDE, CALCIUM CHLORIDE 600; 310; 30; 20 MG/100ML; MG/100ML; MG/100ML; MG/100ML
INJECTION, SOLUTION INTRAVENOUS CONTINUOUS
Status: DISCONTINUED | OUTPATIENT
Start: 2024-06-19 | End: 2024-06-19 | Stop reason: HOSPADM

## 2024-06-19 RX ORDER — POLYETHYLENE GLYCOL 3350 17 G/17G
1-2 POWDER, FOR SOLUTION ORAL DAILY
COMMUNITY
Start: 2024-06-19

## 2024-06-19 RX ORDER — CEFAZOLIN SODIUM/WATER 2 G/20 ML
2 SYRINGE (ML) INTRAVENOUS
Status: COMPLETED | OUTPATIENT
Start: 2024-06-19 | End: 2024-06-19

## 2024-06-19 RX ORDER — TAMSULOSIN HYDROCHLORIDE 0.4 MG/1
0.4 CAPSULE ORAL DAILY
Qty: 5 CAPSULE | Refills: 0 | Status: SHIPPED | OUTPATIENT
Start: 2024-06-19 | End: 2024-06-24

## 2024-06-19 RX ORDER — KETOROLAC TROMETHAMINE 30 MG/ML
INJECTION, SOLUTION INTRAMUSCULAR; INTRAVENOUS PRN
Status: DISCONTINUED | OUTPATIENT
Start: 2024-06-19 | End: 2024-06-19

## 2024-06-19 RX ADMIN — MIDAZOLAM 2 MG: 1 INJECTION INTRAMUSCULAR; INTRAVENOUS at 10:07

## 2024-06-19 RX ADMIN — SODIUM CHLORIDE, POTASSIUM CHLORIDE, SODIUM LACTATE AND CALCIUM CHLORIDE: 600; 310; 30; 20 INJECTION, SOLUTION INTRAVENOUS at 10:07

## 2024-06-19 RX ADMIN — SUGAMMADEX 175 MG: 100 INJECTION, SOLUTION INTRAVENOUS at 13:13

## 2024-06-19 RX ADMIN — FENTANYL CITRATE 50 MCG: 50 INJECTION INTRAMUSCULAR; INTRAVENOUS at 10:18

## 2024-06-19 RX ADMIN — PHENYLEPHRINE HYDROCHLORIDE 150 MCG: 10 INJECTION INTRAVENOUS at 10:24

## 2024-06-19 RX ADMIN — HYDROMORPHONE HYDROCHLORIDE 0.25 MG: 1 INJECTION, SOLUTION INTRAMUSCULAR; INTRAVENOUS; SUBCUTANEOUS at 11:06

## 2024-06-19 RX ADMIN — HYDROMORPHONE HYDROCHLORIDE 0.2 MG: 1 INJECTION, SOLUTION INTRAMUSCULAR; INTRAVENOUS; SUBCUTANEOUS at 14:04

## 2024-06-19 RX ADMIN — OXYCODONE HYDROCHLORIDE 5 MG: 5 TABLET ORAL at 14:03

## 2024-06-19 RX ADMIN — Medication 50 MG: at 10:19

## 2024-06-19 RX ADMIN — KETOROLAC TROMETHAMINE 30 MG: 30 INJECTION, SOLUTION INTRAMUSCULAR at 12:30

## 2024-06-19 RX ADMIN — PHENYLEPHRINE HYDROCHLORIDE 100 MCG: 10 INJECTION INTRAVENOUS at 11:22

## 2024-06-19 RX ADMIN — LIDOCAINE HYDROCHLORIDE 100 MG: 20 INJECTION, SOLUTION INFILTRATION; PERINEURAL at 10:18

## 2024-06-19 RX ADMIN — ONDANSETRON 4 MG: 2 INJECTION INTRAMUSCULAR; INTRAVENOUS at 13:04

## 2024-06-19 RX ADMIN — HYDROMORPHONE HYDROCHLORIDE 0.2 MG: 1 INJECTION, SOLUTION INTRAMUSCULAR; INTRAVENOUS; SUBCUTANEOUS at 14:17

## 2024-06-19 RX ADMIN — FENTANYL CITRATE 50 MCG: 50 INJECTION INTRAMUSCULAR; INTRAVENOUS at 10:59

## 2024-06-19 RX ADMIN — Medication 2 G: at 10:23

## 2024-06-19 RX ADMIN — DEXAMETHASONE SODIUM PHOSPHATE 4 MG: 4 INJECTION, SOLUTION INTRA-ARTICULAR; INTRALESIONAL; INTRAMUSCULAR; INTRAVENOUS; SOFT TISSUE at 10:25

## 2024-06-19 RX ADMIN — Medication 20 MG: at 10:59

## 2024-06-19 RX ADMIN — PROPOFOL 200 MG: 10 INJECTION, EMULSION INTRAVENOUS at 10:18

## 2024-06-19 RX ADMIN — PHENYLEPHRINE HYDROCHLORIDE 100 MCG: 10 INJECTION INTRAVENOUS at 10:35

## 2024-06-19 ASSESSMENT — ACTIVITIES OF DAILY LIVING (ADL)
ADLS_ACUITY_SCORE: 31

## 2024-06-19 NOTE — ANESTHESIA CARE TRANSFER NOTE
Patient: Freeman Velazquez    Procedure: Procedure(s):  Cystoscopy, Bilateral retrograde pyelogram, Bilateral ureteroscopy with stone extraction, Left laser lithotripsy, left ureteral stent exchange, right stent removal       Diagnosis: Bilateral nephrolithiasis [N20.0]  Diagnosis Additional Information: No value filed.    Anesthesia Type:   General     Note:    Oropharynx: oropharynx clear of all foreign objects and spontaneously breathing  Level of Consciousness: drowsy  Oxygen Supplementation: face mask  Level of Supplemental Oxygen (L/min / FiO2): 6  Independent Airway: airway patency satisfactory and stable  Dentition: dentition unchanged  Vital Signs Stable: post-procedure vital signs reviewed and stable  Report to RN Given: handoff report given  Patient transferred to: PACU  Comments: .Anesthesia Care Transfer Note    Patient: Freeman Velazquez    Transferred to: PACU    Patient vital signs: stable    Airway: none    Monitors applied, VSS.  Patient awake and comfortable, breathing spontaneously.  Report given to RN with transfer of care.        Zandra Jordan CRNA  6/19/2024  1:31 PM      Handoff Report: Identifed the Patient, Identified the Reponsible Provider, Reviewed the pertinent medical history, Discussed the surgical course, Reviewed Intra-OP anesthesia mangement and issues during anesthesia, Set expectations for post-procedure period and Allowed opportunity for questions and acknowledgement of understanding  Vitals:  Vitals Value Taken Time   /71 06/19/24 1325   Temp     Pulse 76 06/19/24 1329   Resp 9 06/19/24 1329   SpO2 99 % 06/19/24 1329   Vitals shown include unfiled device data.    Electronically Signed By: MARK Corrales CRNA  June 19, 2024  1:30 PM

## 2024-06-19 NOTE — OP NOTE
OPERATIVE REPORT    PREOPERATIVE DIAGNOSIS:  Left ureteral stone, bilateral renal stones    POSTOPERATIVE DIAGNOSIS: Bilateral renal stones    PROCEDURES PERFORMED:   Cystoscopy  Bilateral retrograde pyelogram  Left ureteroscopy laser lithotripsy and stone basket traction, modifier 22 for over 60 minutes longer than normal case given burden of stone  Right ureteroscopy stone basket extraction  Right ureteral stent removal  Left ureteral stent exchange  IntraOp event rotational fluoroscopic imaging less than 1 hour    STAFF SURGEON: Umair Cheung MD  ASSISTANT(S): Juan Lima MD (PGY 3); Johny Guallpa MS4  ANESTHESIA: General   ESTIMATED BLOOD LOSS: 10 ml  COMPLICATIONS: None.   SPECIMEN: Right renal stone for analysis    Stone for analysis    SIGNIFICANT FINDINGS:   Normal bladder  Ureteral orifice accommodated 12 Scottish obturator of the ureteral access sheath that did not accommodate the CVAC system scope on the left side  Left stone repositioned to the upper pole as possible dusted with fragments larger than 3 mm removed by basket extraction (estimated stone burden over 2 cm total)  Right lower pole renal stone removed by basket extraction  Right ureter was widely patent and did not require stent exchange  Because of sheath placement and amount of time spent in the left kidney, the left ureteral stent exchange for a 6 Scottish by 20 cm black silicone Cook stent on string    BRIEF OPERATIVE INDICATIONS: Freeman Velazquez is a(n) 64 year old male who presented with left ureteral stone and bilateral nephrolithiasis status post ureteral stent placement for inability to access either ureter on [place] 3, 2024.  After a discussion of all risks, benefits, and alternatives, the patient elected to proceed with bilateral ureteroscopy with laser lithotripsy and possible steerable vacuum-assisted suction.    DESCRIPTION OF PROCEDURE:  After informed consent was obtained, the patient was transported to the operating room &  placed supine on the table. After adequate anesthesia was induced, the patient was placed in lithotomy and prepped and draped in the usual sterile fashion. A timeout was taken to confirm correct patient, procedure and laterality. Pre-operative IV antibiotics were administered.     A 22 [place] Islander cystoscope inserted through the urethra and the bladder.  As per prior procedure there were no no laterality to the bladder or the prostate.  The stents were in good position bilaterally.  Sensor wires were placed up each ureteral orifice into the collecting system under fluoroscopic guidance and the 2 indwelling stents were removed.    Attention turned to left side first.  The dual-lumen c retrograde pyelogram atheter easily cannulated urine to the proximal ureter and retrograde pyelogram demonstrated no hydronephrosis.  The ureteral stone appeared to have moved into the renal pelvis or lower pole.  A 12 [place] Islander inner trigger of the 12-14 access sheath was inserted to the proximal without resistance.  Because of this we attempted to use a CVAC system and placed it through the introducer.  This was unsuccessful due to buckling of the ureteral orifice.  I attempted to place the 12-14 sheath over the wire however there was significant resistance at the distal ureter.  I then attempted to replace the CVAC system scope however there is visible breakage on the  image and once withdrawing the scope so this was abandoned.    I then used an 11-13 [place] Islander by 46 cm ureteral access sheath placed to the proximal ureter.  The Voice Assist Flex XC digital ureteroscope.  There were two 1 cm stones in the renal pelvis.  1 is able to be repositioned in the upper pole and the other was too big.  In the lower pole there was multiple small stones which were basketed to the best my ability down to stones are 3 mm or greater.  In the upper pole there is a stone that is able to remove by basket extraction.    I then used a 200  m Choco holmium laser  fiber at settings of 0.2 J and 40 Hz and Choco distance and 1 J and 5 Hz on Choco distance to dust the 2 larger stones down into smaller pieces.  This took over an hour longer than a standard case given the size of the stones.  Appeared to be calcium oxalate mixed with either uric acid and/or calcium oxalate dihydrate.    After removing all stones possible, I switched the laser to 1 J and 20 Hz on short pulse to popcorn any residual small fragments and minor dust.    Pullback ureteroscopy is unremarkable and the wire was replaced.    Attention was then turned to the right side.  Dual-lumen catheter was inserted over the right wire and retrograde pyelogram demonstrated no hydronephrosis.  Then put the ureteroscope alongside the wire up the ureter into the kidney.  The 4 mm stone was seen in the lower pole which was removed by basket extraction without difficulty.  There is no injury to the ureter and the stent was not replaced on the side and the wires removed.    On the left side, a 6 Swedish by 28 cm Cook black silicone ureteral stent on a string was placed with good position of the proximal and distal curl seen on fluoroscopy.  The bladder was drained and Uro-Jet was placed in the urethra.    They were awakened from anesthesia and transferred to the PACU.       POSTOP PLAN:  Stent removal Monday, June 24  Renal ultrasound and 24-hour urine with clinic follow-up in 2 months     IUmair, was present for the entire case on 06/19/24.

## 2024-06-19 NOTE — BRIEF OP NOTE
[facility]    Brief Operative Note    Pre-operative diagnosis: Bilateral nephrolithiasis [N20.0]  Post-operative diagnosis Same as pre-operative diagnosis    Procedure: Cystoscopy, Bilateral retrograde pyelogram, Bilateral ureteroscopy with stone extraction, Left laser lithotripsy, left ureteral stent exchange, right stent removal, N/A - Ureter    Surgeon: Surgeons and Role:     * Umair Cheung MD - Primary     * Juan Lima MD - Resident - Assisting  Anesthesia: General   Estimated Blood Loss: Minimal    Drains: 6Fr x 28cm double J ureteral stent on the left   Specimens:   ID Type Source Tests Collected by Time Destination   A :  Calculus/Stone Kidney, Left STONE ANALYSIS Umair Cheung MD 6/19/2024 12:49 PM    B :  Calculus/Stone Kidney, Right STONE ANALYSIS Umair Cheung MD 6/19/2024 12:58 PM      Findings:   Large amount of stone on the left removed/dusted, right stone removed .  Complications: None.  Implants:   Implant Name Type Inv. Item Serial No.  Lot No. LRB No. Used Action   KIT STENT IMAJIN COLOPLAST 7FRX 28CM [id number] - [id number] Stent KIT STENT IMAJIN COLOPLAST 7FRX 28CM [id number]  COLOPLAST [id number] Left 1 Explanted   KIT STENT IMAJIN COLOPLAST 7FRX 28CM [id number] - [id number] Stent KIT STENT IMAJIN COLOPLAST 7FRX 28CM [id number]  COLOPLAST [id number] Right 1 Explanted   STENT URETERAL DBL PIGTAIL BLK MELVA FILIFORM [id number] - [id number] Stent STENT URETERAL DBL PIGTAIL BLK MELVA FILIFORM [id number]  [facility] [id number] Left 1 Implanted

## 2024-06-19 NOTE — PROGRESS NOTES
Temp:  [97.9  F (36.6  C)-98.4  F (36.9  C)] 98.1  F (36.7  C)  Pulse:  [65-92] 85  Resp:  [9-22] 18  BP: (111-128)/(71-92) 123/84  SpO2:  [95 %-99 %] 96 %    Time of notification: 1505  Provider notified: Dr. Juan Lima  Message: RN clarified whether patient is required to void prior to dismissal. Patient also reports he is almost out of some home medications (Pyridium and Flomax).  Orders received: No void needed prior to discharge. Pyridium and Flomax prescriptions renewed and sent to patient's home pharmacy.      Patient discharged home with wife with all questions and concerns addressed.     Per Dr. Cheung's Brief Op Note:  POSTOP PLAN:  Stent removal Monday, June 24  Renal ultrasound and 24-hour urine with clinic follow-up in 2 months

## 2024-06-19 NOTE — ANESTHESIA POSTPROCEDURE EVALUATION
"Patient: Freeman Velazquez    Procedure: Procedure(s):  Cystoscopy, Bilateral retrograde pyelogram, Bilateral ureteroscopy with stone extraction, Left laser lithotripsy, left ureteral stent exchange, right stent removal       Anesthesia Type:  General    Note:  Anesthesia Post Evaluation    Last vitals:  Vitals Value Taken Time   /82 06/19/24 1400   Temp 36.9  C (98.4  F) 06/19/24 1325   Pulse 74 06/19/24 1415   Resp 8 06/19/24 1415   SpO2 98 % 06/19/24 1415   Vitals shown include unfiled device data.  Patient Vitals for the past 24 hrs:   BP Temp Temp src Pulse Resp SpO2 Height Weight   06/19/24 1415 -- -- -- 79 10 98 % -- --   06/19/24 1400 122/82 -- -- 84 11 98 % -- --   06/19/24 1345 124/75 -- -- 73 12 98 % -- --   06/19/24 1330 120/72 -- -- 76 (!) 9 99 % -- --   06/19/24 1325 124/71 36.9  C (98.4  F) Oral 80 10 99 % -- --   06/19/24 0845 (!) 118/92 36.6  C (97.9  F) Oral 65 22 95 % 1.88 m (6' 2\") 86.5 kg (190 lb 11.2 oz)       Electronically Signed By: Teresita Benítez MD  June 19, 2024  2:17 PM  "

## 2024-06-19 NOTE — ANESTHESIA PREPROCEDURE EVALUATION
Anesthesia Pre-Procedure Evaluation    Patient: Freeman Velazquez   MRN: [id number] : 1960        Procedure : Procedure(s):  Cystoscopy, retrograde pyelogram, ureteroscopy with laser lithotripsy, steerable vacuum-assisted suction, ureteral stent placement          Past Medical History:   Diagnosis Date    Arthritis     Distal radius fracture 2011    GERD (gastroesophageal reflux disease)     Kidney stone     Renal colic       Past Surgical History:   Procedure Laterality Date    ARTHROSCOPY KNEE BILATERAL      COLONOSCOPY  2013    Procedure: COLONOSCOPY;;  Surgeon: Lewis Metcalf MD;  Location: [facility]    COLONOSCOPY N/A 2019    Procedure: Combined Colonoscopy, Single Or Multiple Biopsy/Polypectomy By Biopsy;  Surgeon: Javier Judge MD;  Location: [facility]    COLONOSCOPY N/A 2021    Procedure: Colonoscopy, With Polypectomy And Biopsy;  Surgeon: Sasha Martinez DO;  Location: [facility]    COLONOSCOPY N/A 2024    Procedure: Colonoscopy;  Surgeon: Shemar Núñez MD;  Location: [facility] OR    COLONOSCOPY WITH CO2 INSUFFLATION N/A 2019    Procedure: COLONOSCOPY WITH CO2 INSUFFLATION;  Surgeon: Javier Judge MD;  Location: [facility]    COLONOSCOPY WITH CO2 INSUFFLATION N/A 2021    Procedure: COLONOSCOPY, WITH CO2 INSUFFLATION;  Surgeon: Cedrick Sheikh DO;  Location: [facility]    COLONOSCOPY WITH CO2 INSUFFLATION N/A 2021    Procedure: COLONOSCOPY, WITH CO2 INSUFFLATION;  Surgeon: Sasha Martinez DO;  Location: [facility]    COMBINED CYSTOSCOPY, RETROGRADES, URETEROSCOPY, INSERT STENT Bilateral 2024    Procedure: Left Ureteroscopy, Retrograde Pyelogram, Bilateral Ureteral Stent Insertion;  Surgeon: Umair Cheung MD;  Location: [facility]    HC CYSTOSCOPY,INSERT URETERAL STENT Right 2020    Procedure: CYSTOSCOPY, WITH FLEXIBLE URETEROSCOPY, RETROGRADES, AND STENT INSERTION RIGHT;  Surgeon: Wil Alejo MD;  Location[facility];  Service: Urology     KNEE SURGERY Bilateral       Allergies   Allergen Reactions    Tetracycline Shortness Of Breath    Erythromycin       Social History     Tobacco Use    Smoking status: Never    Smokeless tobacco: Never   Substance Use Topics    Alcohol use: Yes     Comment: rare, one drink in 6 months      Wt Readings from Last 1 Encounters:   06/19/24 86.5 kg (190 lb 11.2 oz)        Anesthesia Evaluation            ROS/MED HX  ENT/Pulmonary:  - neg pulmonary ROS     Neurologic:  - neg neurologic ROS     Cardiovascular:  - neg cardiovascular ROS     METS/Exercise Tolerance: >4 METS    Hematologic:  - neg hematologic  ROS     Musculoskeletal:  - neg musculoskeletal ROS     GI/Hepatic:  - neg GI/hepatic ROS   (+) GERD, Asymptomatic on medication,                  Renal/Genitourinary:  - neg Renal ROS   (+) renal disease (KARINE from stones),  Pt does not require dialysis,           Endo:  - neg endo ROS     Psychiatric/Substance Use:  - neg psychiatric ROS     Infectious Disease:  - neg infectious disease ROS     Malignancy:  - neg malignancy ROS     Other:  - neg other ROS        Last Comprehensive Metabolic Panel:  Recent Labs   Lab Test 06/03/24  2045 05/20/24  2014 01/10/20  1304 01/04/18  1543    140 140 141   POTASSIUM 4.0 4.0 3.5 3.9   CHLORIDE 103 104 108 103   CO2 27 27 28 28   ANIONGAP 10 9 5 10   BUN 17.4 16.0 17 15   CR 1.23* 1.08 0.91 0.89   GFRESTIMATED 65 76 >90 88   * 102* 93 82   MARIAA 8.7* 9.4 8.8 8.7      CBC RESULTS:   Recent Labs   Lab Test 06/03/24 2045 05/20/24 2014 03/08/21  1457 01/10/20  1304   WBC 5.6 8.3 5.2 5.8   HGB 14.8 15.2 15.3 15.2   HCT 44.8 46.8 46.5 47.1    334 310 334       Physical Exam    Airway        Mallampati: II   TM distance: > 3 FB   Neck ROM: full   Mouth opening: > 3 cm    Respiratory Devices and Support         Dental  no notable dental history         Cardiovascular   cardiovascular exam normal       Rhythm and rate: regular and normal     Pulmonary   pulmonary exam  "normal        breath sounds clear to auscultation           OUTSIDE LABS:  CBC:   Lab Results   Component Value Date    WBC 5.6 06/03/2024    WBC 8.3 05/20/2024    HGB 14.8 06/03/2024    HGB 15.2 05/20/2024    HCT 44.8 06/03/2024    HCT 46.8 05/20/2024     06/03/2024     05/20/2024     BMP:   Lab Results   Component Value Date     06/03/2024     05/20/2024    POTASSIUM 4.0 06/03/2024    POTASSIUM 4.0 05/20/2024    CHLORIDE 103 06/03/2024    CHLORIDE 104 05/20/2024    CO2 27 06/03/2024    CO2 27 05/20/2024    BUN 17.4 06/03/2024    BUN 16.0 05/20/2024    CR 1.23 (H) 06/03/2024    CR 1.08 05/20/2024     (H) 06/03/2024     (H) 05/20/2024     COAGS: No results found for: \"PTT\", \"INR\", \"FIBR\"  POC: No results found for: \"BGM\", \"HCG\", \"HCGS\"  HEPATIC:   Lab Results   Component Value Date    ALBUMIN 4.1 03/08/2021    PROTTOTAL 7.7 03/08/2021    ALT 32 03/08/2021    AST 23 03/08/2021    ALKPHOS 103 03/08/2021    BILITOTAL 0.9 03/08/2021     OTHER:   Lab Results   Component Value Date    MARIAA 8.7 (L) 06/03/2024    LIPASE 84 03/08/2021    AMYLASE 44 01/04/2018    TSH 0.66 03/08/2021       Anesthesia Plan    ASA Status:  2       Anesthesia Type: General.     - Airway: ETT   Induction: Intravenous, Propofol.   Maintenance: Balanced.        Consents    Anesthesia Plan(s) and associated risks, benefits, and realistic alternatives discussed. Questions answered and patient/representative(s) expressed understanding.     - Discussed:     - Discussed with:  Patient      - Extended Intubation/Ventilatory Support Discussed: No.      - Patient is DNR/DNI Status: No          Postoperative Care    Pain management: IV analgesics, Oral pain medications, Multi-modal analgesia.   PONV prophylaxis: Ondansetron (or other 5HT-3)     Comments:               Teresita Benítez MD    I have reviewed the pertinent notes and labs in the chart from the past 30 days and (re)examined the patient.  Any updates or " changes from those notes are reflected in this note.

## 2024-06-19 NOTE — ANESTHESIA PROCEDURE NOTES
Airway       Patient location during procedure: OR       Procedure Start/Stop Times: 6/19/2024 10:21 AM  Staff -        CRNA: Zandra Jordan APRN CRNA       Performed By: CRNAIndications and Patient Condition       Indications for airway management: maegan-procedural       Induction type:intravenous       Mask difficulty assessment: 1 - vent by mask    Final Airway Details       Final airway type: endotracheal airway       Successful airway: ETT - single  Endotracheal Airway Details        ETT size (mm): 7.5       Cuffed: yes       Inital cuff pressure (cm H2O): 20       Successful intubation technique: direct laryngoscopy       DL Blade Type: MAC 3       Grade View of Cords: 1       Adjucts: stylet       Position: Right       Measured from: lips       Secured at (cm): 22       Bite block used: None    Post intubation assessment        Placement verified by: capnometry, equal breath sounds and chest rise        Number of attempts at approach: 1       Number of other approaches attempted: 0       Secured with: tape       Ease of procedure: easy       Dentition: Intact and Unchanged    Medication(s) Administered   Medication Administration Time: 6/19/2024 10:21 AM

## 2024-06-19 NOTE — DISCHARGE INSTRUCTIONS
To contact a doctor, call ____[facility]____ [phone] __________________ or:  '   [phone] and ask for the Resident On Call for          _______Urology___________________________________ (answered 24 hours a day)  '   Emergency Department:  [facility]:   [phone]                       Rev. 9/2017 by [facility] [place]

## 2024-06-22 LAB
APPEARANCE STONE: NORMAL
COMPN STONE: NORMAL
SPECIMEN WT: 124 MG

## 2024-06-23 LAB
APPEARANCE STONE: NORMAL
COMPN STONE: NORMAL
SPECIMEN WT: 13 MG

## 2024-08-02 ENCOUNTER — TELEPHONE (OUTPATIENT)
Dept: UROLOGY | Facility: CLINIC | Age: 64
End: 2024-08-02
Payer: MEDICARE

## 2024-08-02 NOTE — TELEPHONE ENCOUNTER
Request ordered to [facility] to send patient  24hour testing kit.  Dominique Adorno RN

## 2024-08-27 ENCOUNTER — ANCILLARY PROCEDURE (OUTPATIENT)
Dept: ULTRASOUND IMAGING | Facility: CLINIC | Age: 64
End: 2024-08-27
Attending: UROLOGY
Payer: MEDICARE

## 2024-08-27 DIAGNOSIS — N20.0 CALCULUS OF KIDNEY: ICD-10-CM

## 2024-08-27 PROCEDURE — 76770 US EXAM ABDO BACK WALL COMP: CPT | Mod: TC | Performed by: RADIOLOGY

## 2024-08-28 ENCOUNTER — VIRTUAL VISIT (OUTPATIENT)
Dept: UROLOGY | Facility: CLINIC | Age: 64
End: 2024-08-28
Payer: MEDICARE

## 2024-08-28 DIAGNOSIS — R39.12 BENIGN PROSTATIC HYPERPLASIA WITH WEAK URINARY STREAM: ICD-10-CM

## 2024-08-28 DIAGNOSIS — N20.0 NEPHROLITHIASIS: ICD-10-CM

## 2024-08-28 DIAGNOSIS — N40.1 BENIGN PROSTATIC HYPERPLASIA WITH WEAK URINARY STREAM: ICD-10-CM

## 2024-08-28 DIAGNOSIS — N20.0 URIC ACID NEPHROLITHIASIS: Primary | ICD-10-CM

## 2024-08-28 DIAGNOSIS — Q55.61 CURVATURE OF THE PENIS: ICD-10-CM

## 2024-08-28 DIAGNOSIS — R34 LOW URINE OUTPUT: ICD-10-CM

## 2024-08-28 PROCEDURE — 99215 OFFICE O/P EST HI 40 MIN: CPT | Mod: 95 | Performed by: NURSE PRACTITIONER

## 2024-08-28 ASSESSMENT — PAIN SCALES - GENERAL: PAINLEVEL: MODERATE PAIN (4)

## 2024-08-28 NOTE — PATIENT INSTRUCTIONS
UROLOGY CLINIC VISIT PATIENT INSTRUCTIONS    -Recommend UA/UC for further evaluation of prostate irritation.   -Will refer to Dr. Oviedo or Dr. Salas for concerns of penile curvature.   -Please start on potassium citrate powder 10mEq two times per day with meals. Please obtain blood potassium level 1-2 weeks after start powder.   -Please complete 24 hour urine study after starting on potassium citrate powder for 1-2 weeks.  -Recommend to increase fluid intake to 90-120oz per day.     If you have any issues, questions or concerns in the meantime, do not hesitate to contact us at [facility] at [phone] or via [employer].     Sandra Sims CNP  Department of Urology     DIET & LIFESTYLE CHANGES FOR PATIENTS WITH KIDNEY STONES    If you've had a kidney stone, you are likely to form another. Risk of recurrence is 15% at 1 year, 35% to 40% at 2 years, and 50% at 10 years. Therefore, prevention is very important.  Because of the chemical analysis of both your stone & urine, some changes in your diet & lifestyle are recommended. These recommendations have shown to be effective.    CALCIUM STONES (Oxalate and Phosphate)    Fluid intake is the most important prevention measure to help prevent stones. Fluid intake should be at least 2.5 liters per day or 90-120oz per day. With goal of urine output of >2.5L per day.   Increasing liquids that have citric acid may help such as low calorie orange juice, lemonade (Crystal Light Lemonade or True Lemon/Lime), or adding a citrus to your water.  You can add lemon juice or fresh estephanie to your water daily.  Lemon juice increases the citrate in your urine, and helps decrease the formation of stone and even breakdown certain types of stones. Add a cap full/teaspoon of pure lemon juice to each glass.   Try to limit sugar, especially if you have diabetes.    Helpful Fluid Measurements:  1 liter = 34oz  1 quart = 32 oz  24 pack water: Each bottle 16.9 oz     Low  "Oxalate Diet: Limit your consumption of OXALATE-rich foods including:  All nuts and nut products including peanuts, almonds,peanut butter, almond milk  Spinach  Rhubarb  Beets  Chocolate  Soybeans and soy products     Website:   www.[employer].Stir  Below is a link to a PDF that is based on [employer] research. Please stick to pages 6-9 of this document. My suggestion is to review the list of food that is OK. The \"avoid\" list can be overwhelming.  [email or web address]/[email or web address]_Lists_KSD.pdf?mc_[email or web address]&mc_eid=[email or web address]    Low Sodium Diet: Salt (sodium chloride) is found in abundance in many foods. High sodium levels in the urine can interfere with the kidney's handling of calcium.   Trying a DASH (Dietary Approaches to Stop Hypertension) diet which is eating more fruits and vegetables, limiting salt intake, moderate in low-fat diary products, and low in animal protein.   Try to decrease salt intake to <2000 mg of sodium daily.     Tips for reducing the salt in your diet:  Don't use salt at the table  Reduce the salt used in food preparation. Try 1/2 teaspoon when recipes call for 1 teaspoon.  Use herbs and spices for flavoring instead of salt.  Avoid salty foods.  Check the label before you buy or use a product. Note sodium and portion size information.  Try to consume less than 2,000 mg/day. (1 teaspoon = 2,000 mg)    Foods with high sodium content include:  Processed meat (including luncheon meats, sausage)   Crackers   Instant cereal   Processed cheese   Canned soups   Chips and snack foods   Soy sauce    Low Animal Protein: Reduce animal protein (meat) intake to no more than 8-10 ounces per day.     Maintain a normal calcium diet: Calcium rich foods are encouraged, but no more than 1000 - 1200 mg per day. Researches have found that people with low calcium intakes tend to have more stones. Foods with high calcium content are " acceptable and include:  Calcium rich foods include:   Diary (cheese, milk, and yogurt)  Enriched cereals  Meat and fish  Dark green vegetables    Limit Vitamin C intake to < 1000 mg daily.    Increase fruit and vegetables to 5 servings per day.    Uric Acid Stones  All of the above recommendations may be helpful  Reducing your intake of animal protein is also shown to be helpful.  Avoid high- and moderate- purine containing foods  High: organ meats, anchovies, sardines, mackerel, and water fowl.   Moderate: shellfish, fish, game meats, beef, pork, and meat based soups/broths  You may be asked to take medication to make your urine more ALKALINE; this will help to dissolve your stones & prevent more from forming.  Medications can be used to reduce serum urate levels and/or decrease the risk for the incidence gout and the avoidance of medications/additives promoting hyperuricemia.   There is a correlation between uric acid stones and Gout. The lifestyle interventions, largely overlapping those used for the prevention of recurrent gout, include adjustment of dietary volume and composition, avoidance of alcohol, reduction to ideal body weight, and regular exercise.    Consultation with a dietician may be helpful as well.  Please let our staff know if you are interested in this helpful option so a consult may be placed for you.     NOW Foods-Potassium Citrate Pure Powder  Available through [employer], [employer], and [employer]  ~ $11.00 per 12 oz. Bottle    https://www.amazon.com/Foods-Potassium-Citrate-PurePowder/dp/J46Y9ALYQU/ref=sr_1_5_a_it?ie=UTF8&mwq=4972988912&sr=8-  5&keywords=now+foods+potassium+citrate+pure+powder    Directions for use:  Mix powder in fluid and volume of your choice, (better tolerated if dissolved into large volume amount). Drink solution   slowly. Ingest within 30 minutes of meal and avoid lying down for 30 minutes after consumption to minimize stomach   upset and bowel irregularity.    Dose:  Take    flat teaspoon (10 mEq) two times per day with meals    Disclaimer: This supplement has not been regulated by the FDA for use in kidney stone disease.

## 2024-08-28 NOTE — NURSING NOTE
Is the patient currently in the state of [place]? YES    Visit mode:VIDEO    If the visit is dropped, the patient can be reconnected by: VIDEO VISIT: Text to cell phone:   Telephone Information:   Mobile [phone]       Will anyone else be joining the visit? NO  (If patient encounters technical issues they should call [phone]    How would you like to obtain your AVS? MyChart    Are changes needed to the allergy or medication list? No    Are refills needed on medications prescribed by this physician? NO    Reason for visit: Follow Up    Martine BARR

## 2024-08-28 NOTE — PROGRESS NOTES
Urology Video Office Visit    Video-Visit Details    Type of service:  Video Visit    Video Start Time: 1359    Video End Time: 1444    Originating Location (pt. Location): Home    Distant Location (provider location):  On-site     Platform used for Video Visit: Stylechi           Assessment and Plan:     Assessment:64 year old male with history of Uric Acid/CaOx stones.     Plan:  -Review renal US with patient. Discussed option of follow up CT scan for further evaluation. Pt amenable to definitive stone procedure if recommended.   -Reviewed 24 hour urine study with patient. Noted low urinary volume and low urinary pH. Recommend adequate fluid intake of 90-120oz per day. Discussed option of start on potassium citrate to help with alkalinization of urinary pH. Pt amenable to plan. Due to inability to swallow large pills, will start on potassium citrate powder.  Recommend repeat 24 hour urine study. Pt amenable to plan. Will send litholink kit x 1.   -Please start potassium citrate powder 10mEq BID with meals. Discussed r/b/a of medication. Recommend serum potassium 1-2 weeks after starting potassium citrate powder. Pt verbalized understanding.   -Will obtain UA/UC for further evaluation of prostate irritation. Discussed the diagnosis and natural history of benign prostatic hyperplasia and lower urinary tract symptoms. We discussed treatment options including observation vs. medical therapy. He would like to avoid medical therapy at this time.   -Will refer to Dr. Oviedo or Dr. Salas for concerns of penile curvature.   -RTC in 7-8 weeks.     Sandra Sims CNP  Department of Urology  August 28, 2024    I spent a total of 45 minutes spent on the date of the encounter doing chart review, history and exam, documentation, and further activities as noted above.          Chief Complaint:   Post-op follow up         History of Present Illness:    Freeman Velazquez is a pleasant 64 year old male who presents for post-op  follow up.    Mr. Velazquez had a left URS/LL and a right ureteroscopy on 6/19/24 with Dr. Cheung. He had bilateral ureteral stent placed on 06/03/24 due to inability to access ureters at that time. Right ureter was patent and no post-op stent placed. Left ureteral stent placed post-op.     Was able to remove left ureteral stent on own without complications     Stone Analysis: 80-90%Uric Acid/20-10%CaOx    He is doing well post-op. Does note feelings of prostate irritation, weak urinary stream, and bladder pressure.     He also has concerns of penile curvature.     Renal US on 08/27/24 (images personally reviewed) revealed bilateral symmetrical kidneys without hydronephrosis. Noted bilateral echogenic foci with right measuring around 4mm in size and largest on left measuring around 9mm in size.     Denies any flank pain, fevers, chills, nausea, vomiting, hematuria, or dysuria.     Does have a low fluid intake. Will drink about 32-50oz of fluid per day. Likes to drink Crystal Light tea, sport drinks, juices, and 1 can of Cola every couple of days. Does avoid prefer softer foods due to dentures and history of esophageal stricture requiring dilation     24 hour urine study revealed low urinary volume and low urinary pH.            Past Medical History:     Past Medical History:   Diagnosis Date    Arthritis     Distal radius fracture 7/20/2011    GERD (gastroesophageal reflux disease)     Kidney stone     Renal colic             Past Surgical History:     Past Surgical History:   Procedure Laterality Date    ARTHROSCOPY KNEE BILATERAL      COLONOSCOPY  4/16/2013    Procedure: COLONOSCOPY;;  Surgeon: Lewis Metcalf MD;  Location: [facility]    COLONOSCOPY N/A 1/24/2019    Procedure: Combined Colonoscopy, Single Or Multiple Biopsy/Polypectomy By Biopsy;  Surgeon: Javier Judge MD;  Location: [facility]    COLONOSCOPY N/A 4/6/2021    Procedure: Colonoscopy, With Polypectomy And Biopsy;  Surgeon: Sasha Martinez DO;   Location: [facility]    COLONOSCOPY N/A 4/12/2024    Procedure: Colonoscopy;  Surgeon: Shemar Núñez MD;  Location: [facility] OR    COLONOSCOPY WITH CO2 INSUFFLATION N/A 1/24/2019    Procedure: COLONOSCOPY WITH CO2 INSUFFLATION;  Surgeon: Javier Judge MD;  Location: [facility]    COLONOSCOPY WITH CO2 INSUFFLATION N/A 4/5/2021    Procedure: COLONOSCOPY, WITH CO2 INSUFFLATION;  Surgeon: Cedrick Sheikh DO;  Location: [facility]    COLONOSCOPY WITH CO2 INSUFFLATION N/A 4/6/2021    Procedure: COLONOSCOPY, WITH CO2 INSUFFLATION;  Surgeon: Sasha Martinez DO;  Location: [facility]    COMBINED CYSTOSCOPY, RETROGRADES, URETEROSCOPY, INSERT STENT Bilateral 6/5/2024    Procedure: Left Ureteroscopy, Retrograde Pyelogram, Bilateral Ureteral Stent Insertion;  Surgeon: Umair Cheung MD;  Location: [facility]    COMBINED CYSTOSCOPY, RETROGRADES, URETEROSCOPY, LASER HOLMIUM LITHOTRIPSY URETER(S), INSERT STENT Bilateral 6/19/2024    Procedure: Cystoscopy, Bilateral retrograde pyelogram, Bilateral ureteroscopy with stone extraction, Left laser lithotripsy, left ureteral stent exchange, right stent removal;  Surgeon: Umair Cheung MD;  Location: [facility]    HC CYSTOSCOPY,INSERT URETERAL STENT Right 1/21/2020    Procedure: CYSTOSCOPY, WITH FLEXIBLE URETEROSCOPY, RETROGRADES, AND STENT INSERTION RIGHT;  Surgeon: Wil Alejo MD;  Location: [facility];  Service: Urology    KNEE SURGERY Bilateral             Medications     Current Outpatient Medications   Medication Sig Dispense Refill    dimenhyDRINATE (DRAMAMINE) 50 MG tablet Take 50 mg by mouth nightly as needed for sleep      ibuprofen (ADVIL/MOTRIN) 200 MG tablet Take 200 mg by mouth every 4 hours as needed for pain      ketorolac (TORADOL) 10 MG tablet Take 1 tablet (10 mg) by mouth every 6 hours as needed for moderate pain 40 tablet 0    ondansetron (ZOFRAN ODT) 4 MG ODT tab Take 1 tablet (4 mg) by mouth every 8 hours as needed for nausea 10 tablet 0    ondansetron (ZOFRAN)  4 MG tablet Take 4 mg by mouth every 8 hours as needed for nausea      phenazopyridine (PYRIDIUM) 100 MG tablet Take 1 tablet (100 mg) by mouth 3 times daily as needed for urinary tract discomfort Beware urine turns orange from this medication 15 tablet 0    polyethylene glycol (MIRALAX) 17 GM/Dose powder Take 17-34 g (1-2 Capfuls) by mouth daily      SENNA-docusate sodium (SENNA S) 8.6-50 MG tablet Take 1 tablet by mouth nightly as needed (constipation)      SENNA-docusate sodium (SENNA S) 8.6-50 MG tablet Take 1 tablet by mouth nightly as needed (constipation)      tolterodine ER (DETROL LA) 4 MG 24 hr capsule Take 1 capsule (4 mg) by mouth daily as needed (bladder spasms/urinary urgency) 7 capsule 0     Current Facility-Administered Medications   Medication Dose Route Frequency Provider Last Rate Last Admin    lidocaine 1 % injection 5 mL  5 mL   Darren Jordan MD   5 mL at 08/17/20 1403    methylPREDNISolone (DEPO-MEDROL) injection 80 mg  80 mg   Darren Jordan MD   80 mg at 08/17/20 1403            Family History:     Family History   Problem Relation Age of Onset    Breast Cancer Mother     Allergies Mother     Anesthesia Reaction Mother     Arthritis Mother     Blood Disease Mother     Cancer Mother         breast    Cardiovascular Father     Eye Disorder Father     Heart Disease Father     Cancer Father         skin    Macular Degeneration Father     Glaucoma Father     Urolithiasis Father     Melanoma Father     Diabetes Sister     Diabetes Sister     Diabetes Maternal Grandmother     Skin Cancer Paternal Uncle     Thyroid Disease No family hx of     Cerebrovascular Disease No family hx of     Hypertension No family hx of     Gout No family hx of     Clotting Disorder No family hx of             Social History:     Social History     Socioeconomic History    Marital status:      Spouse name: Not on file    Number of children: Not on file    Years of education: Not on file    Highest  education level: Not on file   Occupational History     Employer: [employer]   Tobacco Use    Smoking status: Never    Smokeless tobacco: Never   Vaping Use    Vaping status: Never Used   Substance and Sexual Activity    Alcohol use: Yes     Comment: rare, one drink in 6 months    Drug use: Not Currently    Sexual activity: Not Currently     Partners: Female     Comment: many years   Other Topics Concern    Parent/sibling w/ CABG, MI or angioplasty before 65F 55M? No   Social History Narrative    Not on file     Social Determinants of Health     Financial Resource Strain: Low Risk  (12/8/2023)    Financial Resource Strain     Within the past 12 months, have you or your family members you live with been unable to get utilities (heat, electricity) when it was really needed?: No   Food Insecurity: Low Risk  (12/8/2023)    Food Insecurity     Within the past 12 months, did you worry that your food would run out before you got money to buy more?: No     Within the past 12 months, did the food you bought just not last and you didn t have money to get more?: No   Transportation Needs: Low Risk  (12/8/2023)    Transportation Needs     Within the past 12 months, has lack of transportation kept you from medical appointments, getting your medicines, non-medical meetings or appointments, work, or from getting things that you need?: No   Physical Activity: Not on file   Stress: Not on file   Social Connections: Not on file   Interpersonal Safety: Low Risk  (12/8/2023)    Interpersonal Safety     Do you feel physically and emotionally safe where you currently live?: Yes     Within the past 12 months, have you been hit, slapped, kicked or otherwise physically hurt by someone?: No     Within the past 12 months, have you been humiliated or emotionally abused in other ways by your partner or ex-partner?: No   Housing Stability: Low Risk  (12/8/2023)    Housing Stability     Do you have housing? : Yes     Are you  worried about losing your housing?: No            Allergies:   Tetracycline and Erythromycin         Review of Systems:  From intake questionnaire   Negative 14 system review except as noted on HPI, nurse's note.         Physical Exam:   General Appearance: Well groomed, hygenic  Eyes: No redness, discharge  Respiratory: No cough, no respiratory distress or labored breathing  Musculoskeletal: Grossly normal, full range of motion in upper extremities, no gross deficits  Skin: No discoloration or apparent rashes  Neurologic - No tremors  Psychiatric - Alert and oriented  The rest of a comprehensive physical examination is deferred due to video visit restrictions        Labs:    I personally reviewed all applicable laboratory data and went over findings with patient  Significant for:    CBC RESULTS:  Recent Labs   Lab Test 06/03/24 2045 05/20/24 2014 03/08/21  1457 01/10/20  1304   WBC 5.6 8.3 5.2 5.8   HGB 14.8 15.2 15.3 15.2    334 310 334        BMP RESULTS:  Recent Labs   Lab Test 06/03/24  2045 05/20/24  2014 01/10/20  1304 01/04/18  1543    140 140 141   POTASSIUM 4.0 4.0 3.5 3.9   CHLORIDE 103 104 108 103   CO2 27 27 28 28   ANIONGAP 10 9 5 10   * 102* 93 82   BUN 17.4 16.0 17 15   CR 1.23* 1.08 0.91 0.89   GFRESTIMATED 65 76 >90 88   GFRESTBLACK  --   --  >90 >90   MARIAA 8.7* 9.4 8.8 8.7       UA RESULTS:   Recent Labs   Lab Test 06/03/24 2119 05/20/24 1951 03/08/21 1458 01/14/20  1348 01/10/20  1309   SG 1.029 1.023 >1.030   < > 1.010   URINEPH 5.5 5.5 5.5   < > 5.0   NITRITE Negative Negative Negative   < > Negative   RBCU >182* 21*  --   --  >182*   WBCU 1 7*  --   --  3    < > = values in this interval not displayed.       CALCIUM RESULTS  Lab Results   Component Value Date    MARIAA 8.7 06/03/2024    MARIAA 9.4 05/20/2024    MARIAA 8.8 01/10/2020    MARIAA 8.7 01/04/2018           Imaging:    I personally reviewed all applicable imaging and went over the below findings with patient.    Results  for orders placed or performed in visit on 08/27/24   US Renal Complete Non-Vascular    Narrative    US RENAL COMPLETE NON-VASCULAR 8/27/2024 3:25 PM    CLINICAL HISTORY: assess for stone or hydronephrosis; Calculus of  kidney  TECHNIQUE: Routine Bilateral Renal and Bladder Ultrasound.    COMPARISON: 6/3/2024    FINDINGS:    RIGHT KIDNEY: 10.4 cm. Few small echogenic foci measuring about 4 mm,  likely corresponding to nonobstructing calculi. Normal cortical  echogenicity and thickness. No hydronephrosis or masses.     LEFT KIDNEY: 10.6 cm. Multiple small echogenic foci, the largest  measuring about 9 mm at the lower pole, corresponding to known renal  calculi. No hydronephrosis. Normal cortical echogenicity and mild  cortical thinning. No masses.     BLADDER: Partly distended with a volume of 117 cc.      Impression    IMPRESSION:  1.  Few echogenic foci in the kidneys, likely corresponding to known  renal calculi. No hydronephrosis.    LISETTE BERGMAN MD         SYSTEM ID:  [id number]

## 2024-08-29 ENCOUNTER — TELEPHONE (OUTPATIENT)
Dept: UROLOGY | Facility: CLINIC | Age: 64
End: 2024-08-29
Payer: MEDICARE

## 2024-08-30 ENCOUNTER — TELEPHONE (OUTPATIENT)
Dept: ORTHOPEDICS | Facility: CLINIC | Age: 64
End: 2024-08-30

## 2024-08-30 NOTE — TELEPHONE ENCOUNTER
ARTHROPLASTY, KNEE, TOTAL     Date Scheduled: 10-23-24  Facility: Surgery Locations: [facility]  Surgeon: Dr. Ramirez   Post-op appointment scheduled: 11-5 & 12-3   scheduled?: No  Surgery packet/instructions confirmed received?  No  Pre op physical/PAC appointment: Edilberto  Special Considerations:     Possible FT?    Ariadna Mallory  Surgery Scheduling Coordinator  Ph: [phone]

## 2024-09-05 NOTE — ED TRIAGE NOTES
"    patient reports that he developed left flank pain 2 hours ago, vomited- has hx of kidneys stones- \"This feels like them.\"    " ICU

## 2024-10-09 ENCOUNTER — TELEPHONE (OUTPATIENT)
Dept: ORTHOPEDICS | Facility: CLINIC | Age: 64
End: 2024-10-09

## 2024-10-09 ENCOUNTER — OFFICE VISIT (OUTPATIENT)
Dept: FAMILY MEDICINE | Facility: CLINIC | Age: 64
End: 2024-10-09
Payer: MEDICARE

## 2024-10-09 VITALS
WEIGHT: 190 LBS | HEIGHT: 74 IN | RESPIRATION RATE: 12 BRPM | OXYGEN SATURATION: 98 % | TEMPERATURE: 98.2 F | DIASTOLIC BLOOD PRESSURE: 77 MMHG | SYSTOLIC BLOOD PRESSURE: 135 MMHG | HEART RATE: 75 BPM | BODY MASS INDEX: 24.38 KG/M2

## 2024-10-09 DIAGNOSIS — Z01.818 PREOP GENERAL PHYSICAL EXAM: Primary | ICD-10-CM

## 2024-10-09 DIAGNOSIS — M17.0 OSTEOARTHRITIS OF BOTH KNEES, UNSPECIFIED OSTEOARTHRITIS TYPE: ICD-10-CM

## 2024-10-09 DIAGNOSIS — Z96.652 STATUS POST TOTAL LEFT KNEE REPLACEMENT: Primary | ICD-10-CM

## 2024-10-09 DIAGNOSIS — N40.1 BENIGN PROSTATIC HYPERPLASIA WITH WEAK URINARY STREAM: ICD-10-CM

## 2024-10-09 DIAGNOSIS — R39.12 BENIGN PROSTATIC HYPERPLASIA WITH WEAK URINARY STREAM: ICD-10-CM

## 2024-10-09 LAB
ALBUMIN UR-MCNC: NEGATIVE MG/DL
APPEARANCE UR: CLEAR
BASOPHILS # BLD AUTO: 0 10E3/UL (ref 0–0.2)
BASOPHILS NFR BLD AUTO: 0 %
BILIRUB UR QL STRIP: NEGATIVE
COLOR UR AUTO: YELLOW
EOSINOPHIL # BLD AUTO: 0.1 10E3/UL (ref 0–0.7)
EOSINOPHIL NFR BLD AUTO: 1 %
ERYTHROCYTE [DISTWIDTH] IN BLOOD BY AUTOMATED COUNT: 13.2 % (ref 10–15)
GLUCOSE UR STRIP-MCNC: NEGATIVE MG/DL
HCT VFR BLD AUTO: 42.6 % (ref 40–53)
HGB BLD-MCNC: 14.2 G/DL (ref 13.3–17.7)
HGB UR QL STRIP: NEGATIVE
IMM GRANULOCYTES # BLD: 0 10E3/UL
IMM GRANULOCYTES NFR BLD: 0 %
KETONES UR STRIP-MCNC: NEGATIVE MG/DL
LEUKOCYTE ESTERASE UR QL STRIP: NEGATIVE
LYMPHOCYTES # BLD AUTO: 1.2 10E3/UL (ref 0.8–5.3)
LYMPHOCYTES NFR BLD AUTO: 25 %
MCH RBC QN AUTO: 29.8 PG (ref 26.5–33)
MCHC RBC AUTO-ENTMCNC: 33.3 G/DL (ref 31.5–36.5)
MCV RBC AUTO: 90 FL (ref 78–100)
MONOCYTES # BLD AUTO: 0.4 10E3/UL (ref 0–1.3)
MONOCYTES NFR BLD AUTO: 9 %
NEUTROPHILS # BLD AUTO: 3.2 10E3/UL (ref 1.6–8.3)
NEUTROPHILS NFR BLD AUTO: 65 %
NITRATE UR QL: NEGATIVE
PH UR STRIP: 5.5 [PH] (ref 5–7)
PLATELET # BLD AUTO: 308 10E3/UL (ref 150–450)
RBC # BLD AUTO: 4.76 10E6/UL (ref 4.4–5.9)
RBC #/AREA URNS AUTO: ABNORMAL /HPF
SP GR UR STRIP: 1.02 (ref 1–1.03)
SQUAMOUS #/AREA URNS AUTO: ABNORMAL /LPF
UROBILINOGEN UR STRIP-ACNC: 0.2 E.U./DL
WBC # BLD AUTO: 4.9 10E3/UL (ref 4–11)
WBC #/AREA URNS AUTO: ABNORMAL /HPF

## 2024-10-09 PROCEDURE — 36415 COLL VENOUS BLD VENIPUNCTURE: CPT

## 2024-10-09 PROCEDURE — 85025 COMPLETE CBC W/AUTO DIFF WBC: CPT

## 2024-10-09 PROCEDURE — 81001 URINALYSIS AUTO W/SCOPE: CPT

## 2024-10-09 PROCEDURE — 90662 IIV NO PRSV INCREASED AG IM: CPT

## 2024-10-09 PROCEDURE — 80048 BASIC METABOLIC PNL TOTAL CA: CPT

## 2024-10-09 PROCEDURE — G0008 ADMIN INFLUENZA VIRUS VAC: HCPCS

## 2024-10-09 PROCEDURE — 87086 URINE CULTURE/COLONY COUNT: CPT

## 2024-10-09 PROCEDURE — 91320 SARSCV2 VAC 30MCG TRS-SUC IM: CPT

## 2024-10-09 PROCEDURE — 90480 ADMN SARSCOV2 VAC 1/ONLY CMP: CPT

## 2024-10-09 PROCEDURE — 99214 OFFICE O/P EST MOD 30 MIN: CPT | Mod: 25

## 2024-10-09 NOTE — PATIENT INSTRUCTIONS
How to Take Your Medication Before Surgery  Preoperative Medication Instructions   Antiplatelet or Anticoagulation Medication Instructions   - Patient is on no antiplatelet or anticoagulation medications.    Additional Medication Instructions  Patient is on no additional chronic medications   - Herbal medications and vitamins: DO NOT TAKE 14 days prior to surgery.   - ibuprofen (Advil, Motrin): DO NOT TAKE 1 day before surgery.        Patient Education   Preparing for Your Surgery  For Adults  Getting started  In most cases, a nurse will call to review your health history and instructions. They will give you an arrival time based on your scheduled surgery time. Please be ready to share:  Your doctor's clinic name and phone number  Your medical, surgical, and anesthesia history  A list of allergies and sensitivities  A list of medicines, including herbal treatments and over-the-counter drugs  Whether the patient has a legal guardian (ask how to send us the papers in advance)  Note: You may not receive a call if you were seen at our PAC (Preoperative Assessment Center).  Please tell us if you're pregnant--or if there's any chance you might be pregnant. Some surgeries may injure a fetus (unborn baby), so they require a pregnancy test. Surgeries that are safe for a fetus don't always need a test, and you can choose whether to have one.   Preparing for surgery  Within 10 to 30 days of surgery: Have a pre-op exam (sometimes called an H&P, or History and Physical). This can be done at a clinic or pre-operative center.  If you're having a , you may not need this exam. Talk to your care team.  At your pre-op exam, talk to your care team about all medicines you take. (This includes CBD oil and any drugs, such as THC, marijuana, and other forms of cannabis.) If you need to stop any medicine before surgery, ask when to start taking it again.  This is for your safety. Many medicines and drugs can make you bleed too much  during surgery. Some change how well surgery (anesthesia) drugs work.  Call your insurance company to let them know you're having surgery. (If you don't have insurance, call [phone].)  Call your clinic if there's any change in your health. This includes a scrape or scratch near the surgery site, or any signs of a cold (sore throat, runny nose, cough, rash, fever).  Eating and drinking guidelines  For your safety: Unless your surgeon tells you otherwise, follow the guidelines below.  Eat and drink as normal until 8 hours before you arrive for surgery. After that, no food or milk. You can spit out gum when you arrive.  Drink clear liquids until 2 hours before you arrive. These are liquids you can see through, like water, Gatorade, and Propel Water. They also include plain black coffee and tea (no cream or milk).  No alcohol for 24 hours before you arrive. The night before surgery, stop any drinks that contain THC.  If your care team tells you to take medicine on the morning of surgery, it's okay to take it with a sip of water. No other medicines or drugs are allowed (including CBD oil)--follow your care team's instructions.  If you have questions the day of surgery, call your hospital or surgery center.   Preventing infection  Shower or bathe the night before and the morning of surgery. Follow the instructions your clinic gave you. (If no instructions, use regular soap.)  Don't shave or clip hair near your surgery site. We'll remove the hair if needed.  Don't smoke or vape the morning of surgery. No chewing tobacco for 6 hours before you arrive. A nicotine patch is okay. You may spit out nicotine gum when you arrive.  For some surgeries, the surgeon will tell you to fully quit smoking and nicotine.  We will make every effort to keep you safe from infection. We will:  Clean our hands often with soap and water (or an alcohol-based hand rub).  Clean the skin at your surgery site with a special soap that kills  germs.  Give you a special gown to keep you warm. (Cold raises the risk of infection.)  Wear hair covers, masks, gowns, and gloves during surgery.  Give antibiotic medicine, if prescribed. Not all surgeries need this medicine.  What to bring on the day of surgery  Photo ID and insurance card  Copy of your health care directive, if you have one  Glasses and hearing aids (bring cases)  You can't wear contacts during surgery  Inhaler and eye drops, if you use them (tell us about these when you arrive)  CPAP machine or breathing device, if you use them  A few personal items, if spending the night  If you have . . .  A pacemaker, ICD (cardiac defibrillator), or other implant: Bring the ID card.  An implanted stimulator: Bring the remote control.  A legal guardian: Bring a copy of the certified (court-stamped) guardianship papers.  Please remove any jewelry, including body piercings. Leave jewelry and other valuables at home.  If you're going home the day of surgery  You must have a responsible adult drive you home. They should stay with you overnight as well.  If you don't have someone to stay with you, and you aren't safe to go home alone, we may keep you overnight. Insurance often won't pay for this.  After surgery  If it's hard to control your pain or you need more pain medicine, please call your surgeon's office.  Questions?   If you have any questions for your care team, list them here:   ____________________________________________________________________________________________________________________________________________________________________________________________________________________________________________________________  For informational purposes only. Not to replace the advice of your health care provider. Copyright   2003, 2019 [facility]. All rights reserved. Clinically reviewed by Harlan Lou MD. [employer] [id number] - REV 08/24.

## 2024-10-09 NOTE — TELEPHONE ENCOUNTER
Other: Freeman Land is calling to speak to someone on the team about the [employer] message that he received but [employer] would not let him respond back. Please call him     Could we send this information to you in [employer] or would you prefer to receive a phone call?:   Patient would prefer a phone call   Okay to leave a detailed message?: Yes at Cell number on file:    Telephone Information:   Mobile [phone]

## 2024-10-09 NOTE — PROGRESS NOTES
Preoperative Evaluation  [facility] [place]  [address]  [place] [address]  Phone: [phone]  Primary Provider: [facility] Madison Fournier  Pre-op Performing Provider: MARK Abarca CNP  Oct 9, 2024             10/9/2024   Surgical Information   What procedure is being done? pre surgical physical to prep for knee replacement   Facility or Hospital where procedure/surgery will be performed[facility]   Who is doing the procedure / surgery? Dr Ramirez   Date of surgery / procedure: october 23   Time of surgery / procedure: unknown   Where do you plan to recover after surgery? at home with family        Fax number for surgical facility: Note does not need to be faxed, will be available electronically in Epic.    Assessment & Plan     The proposed surgical procedure is considered INTERMEDIATE risk.    Preop general physical exam  Recent BMP indicated elevated creatinine due to a previous kidney stone. BMP ordered for follow up. Reviewed Preparing for Your Surgery with patient along what medications should be held before surgery.   - Basic metabolic panel  (Ca, Cl, CO2, Creat, Gluc, K, Na, BUN); Future  - CBC with platelets and differential; Future  - Basic metabolic panel  (Ca, Cl, CO2, Creat, Gluc, K, Na, BUN)  - CBC with platelets and differential    Osteoarthritis of both knees, unspecified osteoarthritis type    Benign prostatic hyperplasia with weak urinary stream  Labs ordered by Sandra Sims released for patient.   - Urine Culture Aerobic Bacterial  - UA with Microscopic  - Urine Microscopic Exam     - No identified additional risk factors other than previously addressed    Preoperative Medication Instructions  Antiplatelet or Anticoagulation Medication Instructions   - Patient is on no antiplatelet or anticoagulation medications.    Additional Medication Instructions  Patient is on no additional chronic medications   - Herbal medications and  vitamins: DO NOT TAKE 14 days prior to surgery.   - ibuprofen (Advil, Motrin): DO NOT TAKE 1 day before surgery.     Recommendation  Approval given to proceed with proposed procedure, without further diagnostic evaluation.    Vicky Millard is a 64 year old, presenting for the following:  Pre-Op Exam          10/9/2024     1:42 PM   Additional Questions   Roomed by ailyn WONG related to upcoming procedure: Left knee arthritis         10/9/2024   Pre-Op Questionnaire   Have you ever had a heart attack or stroke? No   Have you ever had surgery on your heart or blood vessels, such as a stent placement, a coronary artery bypass, or surgery on an artery in your head, neck, heart, or legs? No   Do you have chest pain with activity? No   Do you have a history of heart failure? No   Do you currently have a cold, bronchitis or symptoms of other infection? No   Do you have a cough, shortness of breath, or wheezing? No   Do you or anyone in your family have previous history of blood clots? No   Do you or does anyone in your family have a serious bleeding problem such as prolonged bleeding following surgeries or cuts? No   Have you ever had problems with anemia or been told to take iron pills? No   Have you had any abnormal blood loss such as black, tarry or bloody stools? No   Have you ever had a blood transfusion? No   Are you willing to have a blood transfusion if it is medically needed before, during, or after your surgery? Yes   Have you or any of your relatives ever had problems with anesthesia? No   Do you have sleep apnea, excessive snoring or daytime drowsiness? No   Do you have any artifical heart valves or other implanted medical devices like a pacemaker, defibrillator, or continuous glucose monitor? No   Do you have artificial joints? No   Are you allergic to latex? No        Health Care Directive  Patient does not have a Health Care Directive or Living Will: Discussed advance care planning with patient;  however, patient declined at this time.    Preoperative Review of    reviewed - controlled substances prescribed by other outside provider(s).          Patient Active Problem List    Diagnosis Date Noted    Osteoarthritis of both knees, unspecified osteoarthritis type 08/26/2020     Priority: Medium    Renal colic 03/10/2020     Priority: Medium    Calculus of ureter 03/10/2020     Priority: Medium     Formatting of this note might be different from the original. Added automatically from request for surgery [id number]      Calculus of kidney 01/14/2020     Priority: Medium    Gastroesophageal reflux disease with esophagitis 01/04/2018     Priority: Medium    Family history of malignant melanoma of skin 09/19/2017     Priority: Medium    ACP (advance care planning) 03/09/2016     Priority: Medium     Advance Care Planning 3/9/2016: ACP Review of Chart / Resources Provided:  Reviewed chart for advance care plan.  Freeman Velazquez has no plan or code status on file. Discussed available resources and provided with information. Confirmed code status reflects current choices pending further ACP discussions.  Confirmed/documented legally designated decision makers.  Added by Jennifer Platt            Family history of malignant neoplasm of prostate 01/19/2012     Priority: Medium      Past Medical History:   Diagnosis Date    Arthritis     Distal radius fracture 7/20/2011    GERD (gastroesophageal reflux disease)     Kidney stone     Renal colic      Past Surgical History:   Procedure Laterality Date    ARTHROSCOPY KNEE BILATERAL      COLONOSCOPY  4/16/2013    Procedure: COLONOSCOPY;;  Surgeon: Lewis Metcalf MD;  Location: [facility]    COLONOSCOPY N/A 1/24/2019    Procedure: Combined Colonoscopy, Single Or Multiple Biopsy/Polypectomy By Biopsy;  Surgeon: Javier Judge MD;  Location: [facility]    COLONOSCOPY N/A 4/6/2021    Procedure: Colonoscopy, With Polypectomy And Biopsy;  Surgeon: Sasha Martinez  ;  Location: [facility]    COLONOSCOPY N/A 4/12/2024    Procedure: Colonoscopy;  Surgeon: Shemar Núñez MD;  Location: [facility] OR    COLONOSCOPY WITH CO2 INSUFFLATION N/A 1/24/2019    Procedure: COLONOSCOPY WITH CO2 INSUFFLATION;  Surgeon: Javier Judge MD;  Location: [facility]    COLONOSCOPY WITH CO2 INSUFFLATION N/A 4/5/2021    Procedure: COLONOSCOPY, WITH CO2 INSUFFLATION;  Surgeon: Cedrick Sheikh DO;  Location: [facility]    COLONOSCOPY WITH CO2 INSUFFLATION N/A 4/6/2021    Procedure: COLONOSCOPY, WITH CO2 INSUFFLATION;  Surgeon: Sasha Martinez DO;  Location: [facility]    COMBINED CYSTOSCOPY, RETROGRADES, URETEROSCOPY, INSERT STENT Bilateral 6/5/2024    Procedure: Left Ureteroscopy, Retrograde Pyelogram, Bilateral Ureteral Stent Insertion;  Surgeon: Umair Cheung MD;  Location: [facility]    COMBINED CYSTOSCOPY, RETROGRADES, URETEROSCOPY, LASER HOLMIUM LITHOTRIPSY URETER(S), INSERT STENT Bilateral 6/19/2024    Procedure: Cystoscopy, Bilateral retrograde pyelogram, Bilateral ureteroscopy with stone extraction, Left laser lithotripsy, left ureteral stent exchange, right stent removal;  Surgeon: Umair Cheung MD;  Location: [facility]    HC CYSTOSCOPY,INSERT URETERAL STENT Right 1/21/2020    Procedure: CYSTOSCOPY, WITH FLEXIBLE URETEROSCOPY, RETROGRADES, AND STENT INSERTION RIGHT;  Surgeon: Wil Alejo MD;  Location: [facility];  Service: Urology    KNEE SURGERY Bilateral      Current Outpatient Medications   Medication Sig Dispense Refill    ibuprofen (ADVIL/MOTRIN) 200 MG tablet Take 200 mg by mouth every 4 hours as needed for pain      polyethylene glycol (MIRALAX) 17 GM/Dose powder Take 17-34 g (1-2 Capfuls) by mouth daily      dimenhyDRINATE (DRAMAMINE) 50 MG tablet Take 50 mg by mouth nightly as needed for sleep      ketorolac (TORADOL) 10 MG tablet Take 1 tablet (10 mg) by mouth every 6 hours as needed for moderate pain 40 tablet 0    ondansetron (ZOFRAN ODT) 4 MG ODT tab Take 1 tablet (4 mg) by  "mouth every 8 hours as needed for nausea 10 tablet 0    ondansetron (ZOFRAN) 4 MG tablet Take 4 mg by mouth every 8 hours as needed for nausea      phenazopyridine (PYRIDIUM) 100 MG tablet Take 1 tablet (100 mg) by mouth 3 times daily as needed for urinary tract discomfort Beware urine turns orange from this medication 15 tablet 0    SENNA-docusate sodium (SENNA S) 8.6-50 MG tablet Take 1 tablet by mouth nightly as needed (constipation)      SENNA-docusate sodium (SENNA S) 8.6-50 MG tablet Take 1 tablet by mouth nightly as needed (constipation)      tolterodine ER (DETROL LA) 4 MG 24 hr capsule Take 1 capsule (4 mg) by mouth daily as needed (bladder spasms/urinary urgency) 7 capsule 0       Allergies   Allergen Reactions    Tetracycline Shortness Of Breath    Erythromycin         Social History     Tobacco Use    Smoking status: Never    Smokeless tobacco: Never   Substance Use Topics    Alcohol use: Yes     Comment: rare, one drink in 6 months       History   Drug Use Unknown             Review of Systems  CONSTITUTIONAL: NEGATIVE for fever, chills, change in weight  INTEGUMENTARY/SKIN: NEGATIVE for worrisome rashes, moles or lesions  EYES: NEGATIVE for vision changes or irritation  ENT/MOUTH: NEGATIVE for ear, mouth and throat problems  RESP: NEGATIVE for significant cough or SOB  CV: NEGATIVE for chest pain, palpitations or peripheral edema  GI: NEGATIVE for nausea, abdominal pain, heartburn, or change in bowel habits  : NEGATIVE for frequency, dysuria, or hematuria  MUSCULOSKELETAL:POSITIVE  for left knee pain   NEURO: POSITIVE for numbness or tingling right toes   HEME: NEGATIVE for bleeding problems    Objective    /77 (BP Location: Left arm, Patient Position: Sitting, Cuff Size: Adult Regular)   Pulse 75   Temp 98.2  F (36.8  C) (Temporal)   Resp 12   Ht 1.88 m (6' 2\")   Wt 86.2 kg (190 lb)   SpO2 98%   BMI 24.39 kg/m     Estimated body mass index is 24.39 kg/m  as calculated from the " "following:    Height as of this encounter: 1.88 m (6' 2\").    Weight as of this encounter: 86.2 kg (190 lb).  Physical Exam  GENERAL: alert and no distress  EYES: Eyes grossly normal to inspection, PERRL and conjunctivae and sclerae normal  HENT: ear canals and TM's normal, nose and mouth without ulcers or lesions  NECK: no adenopathy, no asymmetry, masses, or scars  RESP: lungs clear to auscultation - no rales, rhonchi or wheezes  CV: regular rate and rhythm, normal S1 S2, no S3 or S4, no murmur, click or rub, no peripheral edema  ABDOMEN: soft, nontender, no hepatosplenomegaly, no masses and bowel sounds normal  MS: no gross musculoskeletal defects noted, no edema  NEURO: Normal strength and tone, mentation intact and speech normal  PSYCH: mentation appears normal, affect normal/bright    Recent Labs   Lab Test 06/03/24 2045 05/20/24 2014   HGB 14.8 15.2    334    140   POTASSIUM 4.0 4.0   CR 1.23* 1.08        Diagnostics  Labs pending at this time.  Results will be reviewed when available.   No EKG required, no history of coronary heart disease, significant arrhythmia, peripheral arterial disease or other structural heart disease.    Revised Cardiac Risk Index (RCRI)  The patient has the following serious cardiovascular risks for perioperative complications:   - No serious cardiac risks = 0 points     RCRI Interpretation: 0 points: Class I (very low risk - 0.4% complication rate)         Signed Electronically by: MARK Abarca CNP  A copy of this evaluation report is provided to the requesting physician.        "

## 2024-10-10 LAB
ANION GAP SERPL CALCULATED.3IONS-SCNC: 10 MMOL/L (ref 7–15)
BACTERIA UR CULT: NO GROWTH
BUN SERPL-MCNC: 16.1 MG/DL (ref 8–23)
CALCIUM SERPL-MCNC: 8.9 MG/DL (ref 8.8–10.4)
CHLORIDE SERPL-SCNC: 106 MMOL/L (ref 98–107)
CREAT SERPL-MCNC: 1.06 MG/DL (ref 0.67–1.17)
EGFRCR SERPLBLD CKD-EPI 2021: 77 ML/MIN/1.73M2
GLUCOSE SERPL-MCNC: 62 MG/DL (ref 70–99)
HCO3 SERPL-SCNC: 26 MMOL/L (ref 22–29)
POTASSIUM SERPL-SCNC: 4.2 MMOL/L (ref 3.4–5.3)
SODIUM SERPL-SCNC: 142 MMOL/L (ref 135–145)

## 2024-10-10 NOTE — TELEPHONE ENCOUNTER
Called and talked with patient to discuss surgery packet of information.   Planning on stay overnight due to comfort level.  Has surgery packet but not surgical soap. He will pick this up from a [facility].   PT order placed.   We discussed his surgery and answered questions.   He understands he will have a few more phone calls and he will call us with any other questions.  Sana Ewing RN

## 2024-10-11 NOTE — RESULT ENCOUNTER NOTE
Miguelito Millard,     Your blood sugar is low. This is most likely due to you fasting before labs. Because you felt fine, there is no further work up needed. If you have fatigue, dizziness or shortness of breath, please follow up with your PCP.     All other labs are normal.      Feel free to contact me via [employer] or call the clinic at [phone].     Sincerely,  Meghan Hensley DNP, FNP-C

## 2024-10-14 NOTE — PROGRESS NOTES
Ortho Navigator Note      Pre-op Date 10/9/24     Medical Clearance  Cleared     Labs Stable      COVID Test Date No longer indicated      Skin  Intact      Activity: Ambulates independently without assistive device      Equipment Need Patient will likely need a walker for discharge. Defer to PT/OT for recs.       Meds to Hold Held all supplements 14 days prior to surgery  Ibuprofen- Stop 24 hr prior to surgery   * Medication recommendations are not intended to be exhaustive; they are limited to common medications that are potentially dangerous if incorrectly managed   NPO Instructions  Instructed to stop solids 8 hr prior to arrival and clears 2 hr prior to arrival.       Pre-op Joint Education Complete? Complete    Discharge Plan Patient has plan to discharge home on morning of POD 1.   Matilde will arrive at hospital at 0800 to participate in therapy and discharge education. They will then transport patient home    /Transportation Matilde will be  and transportation.  is physically able to care for patient.      Barriers to Discharge No barriers to discharge.      Additional Info/   Special Needs : Patient had no unanswered questions or concerns.           10/14/24 7515   Discharge Planning   Patient/Family Anticipates Transition to home   Concerns to be Addressed no discharge needs identified   Living Arrangements   People in Home child(luis), adult;spouse   Type of Residence Private Residence   Is your private residence a single family home or apartment? Single family home   Number of Stairs, Within Home, Primary two   Stair Railings, Within Home, Primary railings safe and in good condition   Once home, are you able to live on one level? Yes   Which level? Main Level   Bathroom Shower/Tub Walk-in shower   Equipment Currently Used at Home none   Support System   Support Systems Spouse/Significant Other   Do you have someone available to stay with you one or two nights once you are home? Yes    Medical Clearance   Date of Physical 10/09/24   It is recommended that you call and check with any specialty providers before surgery to see if you need surgical clearance.  Do you see any specialty providers outside of your primary care provider? No   Blood   Known Bleeding Disorder or Coagulopathy No   Does the patient have any Protestant/cultural preferences related to blood products? No   Education   Has the patient scheduled or completed pre-op total joint education, either in class or online, in the last 12 months? No   Patient attended total joint pre-op class/received pre-op teaching  online   Relationship/Living Environment   Name(s) of People in Home Matilde (spouse )

## 2024-10-23 ENCOUNTER — ANESTHESIA EVENT (OUTPATIENT)
Dept: SURGERY | Facility: CLINIC | Age: 64
End: 2024-10-23
Payer: MEDICARE

## 2024-10-23 ENCOUNTER — APPOINTMENT (OUTPATIENT)
Dept: GENERAL RADIOLOGY | Facility: CLINIC | Age: 64
End: 2024-10-23
Attending: PHYSICIAN ASSISTANT
Payer: MEDICARE

## 2024-10-23 ENCOUNTER — APPOINTMENT (OUTPATIENT)
Dept: PHYSICAL THERAPY | Facility: CLINIC | Age: 64
End: 2024-10-23
Attending: PHYSICIAN ASSISTANT
Payer: MEDICARE

## 2024-10-23 ENCOUNTER — HOSPITAL ENCOUNTER (OUTPATIENT)
Facility: CLINIC | Age: 64
Discharge: HOME HEALTH | End: 2024-10-24
Attending: ORTHOPAEDIC SURGERY | Admitting: ORTHOPAEDIC SURGERY
Payer: MEDICARE

## 2024-10-23 ENCOUNTER — ANESTHESIA (OUTPATIENT)
Dept: SURGERY | Facility: CLINIC | Age: 64
End: 2024-10-23
Payer: MEDICARE

## 2024-10-23 DIAGNOSIS — Z96.652 STATUS POST TOTAL LEFT KNEE REPLACEMENT: Primary | ICD-10-CM

## 2024-10-23 PROBLEM — Z96.659 S/P TOTAL KNEE ARTHROPLASTY: Status: ACTIVE | Noted: 2024-10-23

## 2024-10-23 PROCEDURE — 272N000001 HC OR GENERAL SUPPLY STERILE: Performed by: ORTHOPAEDIC SURGERY

## 2024-10-23 PROCEDURE — 250N000011 HC RX IP 250 OP 636: Performed by: PHYSICIAN ASSISTANT

## 2024-10-23 PROCEDURE — 258N000003 HC RX IP 258 OP 636: Performed by: PHYSICIAN ASSISTANT

## 2024-10-23 PROCEDURE — 99214 OFFICE O/P EST MOD 30 MIN: CPT

## 2024-10-23 PROCEDURE — 258N000003 HC RX IP 258 OP 636: Performed by: NURSE ANESTHETIST, CERTIFIED REGISTERED

## 2024-10-23 PROCEDURE — 258N000001 HC RX 258: Performed by: ORTHOPAEDIC SURGERY

## 2024-10-23 PROCEDURE — 250N000009 HC RX 250: Performed by: ORTHOPAEDIC SURGERY

## 2024-10-23 PROCEDURE — 360N000077 HC SURGERY LEVEL 4, PER MIN: Performed by: ORTHOPAEDIC SURGERY

## 2024-10-23 PROCEDURE — 250N000025 HC SEVOFLURANE, PER MIN: Performed by: ORTHOPAEDIC SURGERY

## 2024-10-23 PROCEDURE — 999N000141 HC STATISTIC PRE-PROCEDURE NURSING ASSESSMENT: Performed by: ORTHOPAEDIC SURGERY

## 2024-10-23 PROCEDURE — 27447 TOTAL KNEE ARTHROPLASTY: CPT | Performed by: ANESTHESIOLOGY

## 2024-10-23 PROCEDURE — C1776 JOINT DEVICE (IMPLANTABLE): HCPCS | Performed by: ORTHOPAEDIC SURGERY

## 2024-10-23 PROCEDURE — 710N000010 HC RECOVERY PHASE 1, LEVEL 2, PER MIN: Performed by: ORTHOPAEDIC SURGERY

## 2024-10-23 PROCEDURE — C1713 ANCHOR/SCREW BN/BN,TIS/BN: HCPCS | Performed by: ORTHOPAEDIC SURGERY

## 2024-10-23 PROCEDURE — 97116 GAIT TRAINING THERAPY: CPT | Mod: GP

## 2024-10-23 PROCEDURE — 97110 THERAPEUTIC EXERCISES: CPT | Mod: GP

## 2024-10-23 PROCEDURE — 250N000011 HC RX IP 250 OP 636: Performed by: ANESTHESIOLOGY

## 2024-10-23 PROCEDURE — 250N000013 HC RX MED GY IP 250 OP 250 PS 637: Performed by: PHYSICIAN ASSISTANT

## 2024-10-23 PROCEDURE — 27447 TOTAL KNEE ARTHROPLASTY: CPT | Performed by: NURSE ANESTHETIST, CERTIFIED REGISTERED

## 2024-10-23 PROCEDURE — 999N000065 XR KNEE PORT LEFT 1/2 VIEWS: Mod: LT

## 2024-10-23 PROCEDURE — 250N000009 HC RX 250: Mod: JZ | Performed by: NURSE ANESTHETIST, CERTIFIED REGISTERED

## 2024-10-23 PROCEDURE — 250N000011 HC RX IP 250 OP 636: Performed by: NURSE ANESTHETIST, CERTIFIED REGISTERED

## 2024-10-23 PROCEDURE — 97161 PT EVAL LOW COMPLEX 20 MIN: CPT | Mod: GP

## 2024-10-23 PROCEDURE — 370N000017 HC ANESTHESIA TECHNICAL FEE, PER MIN: Performed by: ORTHOPAEDIC SURGERY

## 2024-10-23 DEVICE — LEGION CRUCIATE RETAINING                                    NONPOROUS FEMORAL SIZE 7 LEFT
Type: IMPLANTABLE DEVICE | Site: KNEE | Status: FUNCTIONAL
Brand: LEGION

## 2024-10-23 DEVICE — FULL DOSE BONE CEMENT, 10 PACK CATALOG NUMBER IS 6191-1-010
Type: IMPLANTABLE DEVICE | Site: KNEE | Status: FUNCTIONAL
Brand: SIMPLEX

## 2024-10-23 DEVICE — GENESIS II NON-POROUS TIBIAL                                    BASEPLATE SIZE 7 LEFT
Type: IMPLANTABLE DEVICE | Site: KNEE | Status: FUNCTIONAL
Brand: GENESIS II

## 2024-10-23 DEVICE — LEGION CR HIGH FLEX XLPE SZ 7-8 9MM
Type: IMPLANTABLE DEVICE | Site: KNEE | Status: FUNCTIONAL
Brand: LEGION

## 2024-10-23 RX ORDER — DEXAMETHASONE SODIUM PHOSPHATE 4 MG/ML
4 INJECTION, SOLUTION INTRA-ARTICULAR; INTRALESIONAL; INTRAMUSCULAR; INTRAVENOUS; SOFT TISSUE
Status: DISCONTINUED | OUTPATIENT
Start: 2024-10-23 | End: 2024-10-23 | Stop reason: HOSPADM

## 2024-10-23 RX ORDER — DEXAMETHASONE SODIUM PHOSPHATE 4 MG/ML
INJECTION, SOLUTION INTRA-ARTICULAR; INTRALESIONAL; INTRAMUSCULAR; INTRAVENOUS; SOFT TISSUE PRN
Status: DISCONTINUED | OUTPATIENT
Start: 2024-10-23 | End: 2024-10-23

## 2024-10-23 RX ORDER — AMOXICILLIN 250 MG
1 CAPSULE ORAL 2 TIMES DAILY
Status: DISCONTINUED | OUTPATIENT
Start: 2024-10-23 | End: 2024-10-24 | Stop reason: HOSPADM

## 2024-10-23 RX ORDER — LIDOCAINE HYDROCHLORIDE 20 MG/ML
INJECTION, SOLUTION INFILTRATION; PERINEURAL PRN
Status: DISCONTINUED | OUTPATIENT
Start: 2024-10-23 | End: 2024-10-23

## 2024-10-23 RX ORDER — BISACODYL 10 MG
10 SUPPOSITORY, RECTAL RECTAL DAILY PRN
Status: DISCONTINUED | OUTPATIENT
Start: 2024-10-23 | End: 2024-10-24 | Stop reason: HOSPADM

## 2024-10-23 RX ORDER — CEFAZOLIN SODIUM/WATER 2 G/20 ML
2 SYRINGE (ML) INTRAVENOUS
Status: COMPLETED | OUTPATIENT
Start: 2024-10-23 | End: 2024-10-23

## 2024-10-23 RX ORDER — ACETAMINOPHEN 325 MG/1
650 TABLET ORAL EVERY 4 HOURS PRN
Qty: 100 TABLET | Refills: 0 | Status: SHIPPED | OUTPATIENT
Start: 2024-10-23 | End: 2024-11-05

## 2024-10-23 RX ORDER — OXYCODONE HYDROCHLORIDE 10 MG/1
10 TABLET ORAL EVERY 4 HOURS PRN
Status: DISCONTINUED | OUTPATIENT
Start: 2024-10-23 | End: 2024-10-24 | Stop reason: HOSPADM

## 2024-10-23 RX ORDER — SODIUM CHLORIDE, SODIUM LACTATE, POTASSIUM CHLORIDE, CALCIUM CHLORIDE 600; 310; 30; 20 MG/100ML; MG/100ML; MG/100ML; MG/100ML
INJECTION, SOLUTION INTRAVENOUS CONTINUOUS
Status: DISCONTINUED | OUTPATIENT
Start: 2024-10-23 | End: 2024-10-24 | Stop reason: HOSPADM

## 2024-10-23 RX ORDER — HYDROMORPHONE HYDROCHLORIDE 1 MG/ML
0.4 INJECTION, SOLUTION INTRAMUSCULAR; INTRAVENOUS; SUBCUTANEOUS EVERY 5 MIN PRN
Status: DISCONTINUED | OUTPATIENT
Start: 2024-10-23 | End: 2024-10-23 | Stop reason: HOSPADM

## 2024-10-23 RX ORDER — SODIUM CHLORIDE, SODIUM LACTATE, POTASSIUM CHLORIDE, CALCIUM CHLORIDE 600; 310; 30; 20 MG/100ML; MG/100ML; MG/100ML; MG/100ML
INJECTION, SOLUTION INTRAVENOUS CONTINUOUS
Status: DISCONTINUED | OUTPATIENT
Start: 2024-10-23 | End: 2024-10-23 | Stop reason: HOSPADM

## 2024-10-23 RX ORDER — CEFAZOLIN SODIUM 2 G/100ML
2 INJECTION, SOLUTION INTRAVENOUS EVERY 8 HOURS
Status: COMPLETED | OUTPATIENT
Start: 2024-10-23 | End: 2024-10-24

## 2024-10-23 RX ORDER — ASPIRIN 81 MG/1
81 TABLET ORAL 2 TIMES DAILY
Status: DISCONTINUED | OUTPATIENT
Start: 2024-10-23 | End: 2024-10-24 | Stop reason: HOSPADM

## 2024-10-23 RX ORDER — POLYETHYLENE GLYCOL 3350 17 G/17G
1 POWDER, FOR SOLUTION ORAL DAILY PRN
COMMUNITY

## 2024-10-23 RX ORDER — ONDANSETRON 4 MG/1
4 TABLET, ORALLY DISINTEGRATING ORAL EVERY 30 MIN PRN
Status: DISCONTINUED | OUTPATIENT
Start: 2024-10-23 | End: 2024-10-23 | Stop reason: HOSPADM

## 2024-10-23 RX ORDER — FENTANYL CITRATE 50 UG/ML
INJECTION, SOLUTION INTRAMUSCULAR; INTRAVENOUS PRN
Status: DISCONTINUED | OUTPATIENT
Start: 2024-10-23 | End: 2024-10-23

## 2024-10-23 RX ORDER — ACETAMINOPHEN 325 MG/1
975 TABLET ORAL ONCE
Status: COMPLETED | OUTPATIENT
Start: 2024-10-23 | End: 2024-10-23

## 2024-10-23 RX ORDER — LIDOCAINE 40 MG/G
CREAM TOPICAL
Status: DISCONTINUED | OUTPATIENT
Start: 2024-10-23 | End: 2024-10-24 | Stop reason: HOSPADM

## 2024-10-23 RX ORDER — DEXMEDETOMIDINE HYDROCHLORIDE 4 UG/ML
INJECTION, SOLUTION INTRAVENOUS PRN
Status: DISCONTINUED | OUTPATIENT
Start: 2024-10-23 | End: 2024-10-23

## 2024-10-23 RX ORDER — ONDANSETRON 2 MG/ML
4 INJECTION INTRAMUSCULAR; INTRAVENOUS EVERY 30 MIN PRN
Status: DISCONTINUED | OUTPATIENT
Start: 2024-10-23 | End: 2024-10-23 | Stop reason: HOSPADM

## 2024-10-23 RX ORDER — NALOXONE HYDROCHLORIDE 0.4 MG/ML
0.2 INJECTION, SOLUTION INTRAMUSCULAR; INTRAVENOUS; SUBCUTANEOUS
Status: DISCONTINUED | OUTPATIENT
Start: 2024-10-23 | End: 2024-10-24 | Stop reason: HOSPADM

## 2024-10-23 RX ORDER — ACETAMINOPHEN 325 MG/1
975 TABLET ORAL EVERY 8 HOURS
Status: DISCONTINUED | OUTPATIENT
Start: 2024-10-23 | End: 2024-10-24 | Stop reason: HOSPADM

## 2024-10-23 RX ORDER — ONDANSETRON 4 MG/1
4 TABLET, ORALLY DISINTEGRATING ORAL EVERY 6 HOURS PRN
Status: DISCONTINUED | OUTPATIENT
Start: 2024-10-23 | End: 2024-10-24 | Stop reason: HOSPADM

## 2024-10-23 RX ORDER — PROPOFOL 10 MG/ML
INJECTION, EMULSION INTRAVENOUS PRN
Status: DISCONTINUED | OUTPATIENT
Start: 2024-10-23 | End: 2024-10-23

## 2024-10-23 RX ORDER — MAGNESIUM HYDROXIDE 1200 MG/15ML
LIQUID ORAL PRN
Status: DISCONTINUED | OUTPATIENT
Start: 2024-10-23 | End: 2024-10-23 | Stop reason: HOSPADM

## 2024-10-23 RX ORDER — AMOXICILLIN 250 MG
1-2 CAPSULE ORAL 2 TIMES DAILY PRN
Qty: 30 TABLET | Refills: 0 | Status: SHIPPED | OUTPATIENT
Start: 2024-10-23

## 2024-10-23 RX ORDER — OXYCODONE HYDROCHLORIDE 5 MG/1
5 TABLET ORAL EVERY 4 HOURS PRN
Status: DISCONTINUED | OUTPATIENT
Start: 2024-10-23 | End: 2024-10-24 | Stop reason: HOSPADM

## 2024-10-23 RX ORDER — NALOXONE HYDROCHLORIDE 0.4 MG/ML
0.4 INJECTION, SOLUTION INTRAMUSCULAR; INTRAVENOUS; SUBCUTANEOUS
Status: DISCONTINUED | OUTPATIENT
Start: 2024-10-23 | End: 2024-10-24 | Stop reason: HOSPADM

## 2024-10-23 RX ORDER — ONDANSETRON 2 MG/ML
4 INJECTION INTRAMUSCULAR; INTRAVENOUS EVERY 6 HOURS PRN
Status: DISCONTINUED | OUTPATIENT
Start: 2024-10-23 | End: 2024-10-24 | Stop reason: HOSPADM

## 2024-10-23 RX ORDER — TRANEXAMIC ACID 10 MG/ML
1 INJECTION, SOLUTION INTRAVENOUS ONCE
Status: COMPLETED | OUTPATIENT
Start: 2024-10-23 | End: 2024-10-23

## 2024-10-23 RX ORDER — HYDROMORPHONE HYDROCHLORIDE 1 MG/ML
0.4 INJECTION, SOLUTION INTRAMUSCULAR; INTRAVENOUS; SUBCUTANEOUS
Status: DISCONTINUED | OUTPATIENT
Start: 2024-10-23 | End: 2024-10-24 | Stop reason: HOSPADM

## 2024-10-23 RX ORDER — ACETAMINOPHEN 325 MG/1
650 TABLET ORAL EVERY 4 HOURS PRN
Status: DISCONTINUED | OUTPATIENT
Start: 2024-10-26 | End: 2024-10-24 | Stop reason: HOSPADM

## 2024-10-23 RX ORDER — NALOXONE HYDROCHLORIDE 0.4 MG/ML
0.1 INJECTION, SOLUTION INTRAMUSCULAR; INTRAVENOUS; SUBCUTANEOUS
Status: DISCONTINUED | OUTPATIENT
Start: 2024-10-23 | End: 2024-10-23 | Stop reason: HOSPADM

## 2024-10-23 RX ORDER — ASPIRIN 81 MG/1
81 TABLET ORAL 2 TIMES DAILY
Qty: 60 TABLET | Refills: 0 | Status: SHIPPED | OUTPATIENT
Start: 2024-10-23

## 2024-10-23 RX ORDER — PROPOFOL 10 MG/ML
INJECTION, EMULSION INTRAVENOUS CONTINUOUS PRN
Status: DISCONTINUED | OUTPATIENT
Start: 2024-10-23 | End: 2024-10-23

## 2024-10-23 RX ORDER — HYDROMORPHONE HYDROCHLORIDE 1 MG/ML
0.2 INJECTION, SOLUTION INTRAMUSCULAR; INTRAVENOUS; SUBCUTANEOUS EVERY 5 MIN PRN
Status: DISCONTINUED | OUTPATIENT
Start: 2024-10-23 | End: 2024-10-23 | Stop reason: HOSPADM

## 2024-10-23 RX ORDER — POLYETHYLENE GLYCOL 3350 17 G/17G
17 POWDER, FOR SOLUTION ORAL DAILY
Status: DISCONTINUED | OUTPATIENT
Start: 2024-10-24 | End: 2024-10-24 | Stop reason: HOSPADM

## 2024-10-23 RX ORDER — OXYCODONE HYDROCHLORIDE 5 MG/1
5-10 TABLET ORAL EVERY 4 HOURS PRN
Qty: 30 TABLET | Refills: 0 | Status: SHIPPED | OUTPATIENT
Start: 2024-10-23

## 2024-10-23 RX ORDER — HYDROMORPHONE HYDROCHLORIDE 1 MG/ML
0.2 INJECTION, SOLUTION INTRAMUSCULAR; INTRAVENOUS; SUBCUTANEOUS
Status: DISCONTINUED | OUTPATIENT
Start: 2024-10-23 | End: 2024-10-24 | Stop reason: HOSPADM

## 2024-10-23 RX ORDER — COVID-19 ANTIGEN TEST
220-440 KIT MISCELLANEOUS DAILY PRN
Status: ON HOLD | COMMUNITY
End: 2024-10-24

## 2024-10-23 RX ORDER — ONDANSETRON 2 MG/ML
INJECTION INTRAMUSCULAR; INTRAVENOUS PRN
Status: DISCONTINUED | OUTPATIENT
Start: 2024-10-23 | End: 2024-10-23

## 2024-10-23 RX ORDER — SODIUM CHLORIDE, SODIUM LACTATE, POTASSIUM CHLORIDE, CALCIUM CHLORIDE 600; 310; 30; 20 MG/100ML; MG/100ML; MG/100ML; MG/100ML
INJECTION, SOLUTION INTRAVENOUS CONTINUOUS PRN
Status: DISCONTINUED | OUTPATIENT
Start: 2024-10-23 | End: 2024-10-23

## 2024-10-23 RX ORDER — CEFAZOLIN SODIUM/WATER 2 G/20 ML
2 SYRINGE (ML) INTRAVENOUS SEE ADMIN INSTRUCTIONS
Status: DISCONTINUED | OUTPATIENT
Start: 2024-10-23 | End: 2024-10-23 | Stop reason: HOSPADM

## 2024-10-23 RX ORDER — FENTANYL CITRATE 50 UG/ML
50 INJECTION, SOLUTION INTRAMUSCULAR; INTRAVENOUS EVERY 5 MIN PRN
Status: DISCONTINUED | OUTPATIENT
Start: 2024-10-23 | End: 2024-10-23 | Stop reason: HOSPADM

## 2024-10-23 RX ORDER — TRANEXAMIC ACID 650 MG/1
1950 TABLET ORAL ONCE
Status: COMPLETED | OUTPATIENT
Start: 2024-10-23 | End: 2024-10-23

## 2024-10-23 RX ORDER — METHOCARBAMOL 500 MG/1
500 TABLET, FILM COATED ORAL EVERY 6 HOURS PRN
Status: DISCONTINUED | OUTPATIENT
Start: 2024-10-23 | End: 2024-10-24 | Stop reason: HOSPADM

## 2024-10-23 RX ORDER — FENTANYL CITRATE 50 UG/ML
25 INJECTION, SOLUTION INTRAMUSCULAR; INTRAVENOUS EVERY 5 MIN PRN
Status: DISCONTINUED | OUTPATIENT
Start: 2024-10-23 | End: 2024-10-23 | Stop reason: HOSPADM

## 2024-10-23 RX ADMIN — ASPIRIN 81 MG: 81 TABLET, COATED ORAL at 19:51

## 2024-10-23 RX ADMIN — OXYCODONE HYDROCHLORIDE 5 MG: 5 TABLET ORAL at 18:25

## 2024-10-23 RX ADMIN — FENTANYL CITRATE 25 MCG: 50 INJECTION INTRAMUSCULAR; INTRAVENOUS at 12:57

## 2024-10-23 RX ADMIN — OXYCODONE HYDROCHLORIDE 5 MG: 5 TABLET ORAL at 14:24

## 2024-10-23 RX ADMIN — MIDAZOLAM 2 MG: 1 INJECTION INTRAMUSCULAR; INTRAVENOUS at 10:38

## 2024-10-23 RX ADMIN — HYDROMORPHONE HYDROCHLORIDE 0.25 MG: 1 INJECTION, SOLUTION INTRAMUSCULAR; INTRAVENOUS; SUBCUTANEOUS at 11:58

## 2024-10-23 RX ADMIN — ACETAMINOPHEN 975 MG: 325 TABLET, FILM COATED ORAL at 09:28

## 2024-10-23 RX ADMIN — PHENYLEPHRINE HYDROCHLORIDE 100 MCG: 10 INJECTION INTRAVENOUS at 12:01

## 2024-10-23 RX ADMIN — Medication 2 G: at 10:48

## 2024-10-23 RX ADMIN — HYDROMORPHONE HYDROCHLORIDE 0.25 MG: 1 INJECTION, SOLUTION INTRAMUSCULAR; INTRAVENOUS; SUBCUTANEOUS at 11:08

## 2024-10-23 RX ADMIN — SODIUM CHLORIDE, POTASSIUM CHLORIDE, SODIUM LACTATE AND CALCIUM CHLORIDE: 600; 310; 30; 20 INJECTION, SOLUTION INTRAVENOUS at 10:34

## 2024-10-23 RX ADMIN — DEXMEDETOMIDINE HYDROCHLORIDE 8 MCG: 100 INJECTION, SOLUTION INTRAVENOUS at 12:27

## 2024-10-23 RX ADMIN — HYDROMORPHONE HYDROCHLORIDE 0.2 MG: 1 INJECTION, SOLUTION INTRAMUSCULAR; INTRAVENOUS; SUBCUTANEOUS at 13:11

## 2024-10-23 RX ADMIN — HYDROMORPHONE HYDROCHLORIDE 0.25 MG: 1 INJECTION, SOLUTION INTRAMUSCULAR; INTRAVENOUS; SUBCUTANEOUS at 11:27

## 2024-10-23 RX ADMIN — OXYCODONE HYDROCHLORIDE 5 MG: 5 TABLET ORAL at 22:27

## 2024-10-23 RX ADMIN — FENTANYL CITRATE 25 MCG: 50 INJECTION INTRAMUSCULAR; INTRAVENOUS at 13:04

## 2024-10-23 RX ADMIN — TRANEXAMIC ACID 1 G: 10 INJECTION, SOLUTION INTRAVENOUS at 10:48

## 2024-10-23 RX ADMIN — CEFAZOLIN SODIUM 2 G: 2 INJECTION, SOLUTION INTRAVENOUS at 19:51

## 2024-10-23 RX ADMIN — METHOCARBAMOL 500 MG: 500 TABLET ORAL at 13:56

## 2024-10-23 RX ADMIN — OXYCODONE HYDROCHLORIDE 5 MG: 5 TABLET ORAL at 13:55

## 2024-10-23 RX ADMIN — FENTANYL CITRATE 50 MCG: 50 INJECTION INTRAMUSCULAR; INTRAVENOUS at 11:00

## 2024-10-23 RX ADMIN — ACETAMINOPHEN 975 MG: 325 TABLET, FILM COATED ORAL at 17:47

## 2024-10-23 RX ADMIN — LIDOCAINE HYDROCHLORIDE 100 MG: 20 INJECTION, SOLUTION INFILTRATION; PERINEURAL at 10:46

## 2024-10-23 RX ADMIN — ONDANSETRON 4 MG: 2 INJECTION INTRAMUSCULAR; INTRAVENOUS at 12:05

## 2024-10-23 RX ADMIN — FENTANYL CITRATE 50 MCG: 50 INJECTION INTRAMUSCULAR; INTRAVENOUS at 10:46

## 2024-10-23 RX ADMIN — HYDROMORPHONE HYDROCHLORIDE 0.2 MG: 1 INJECTION, SOLUTION INTRAMUSCULAR; INTRAVENOUS; SUBCUTANEOUS at 13:25

## 2024-10-23 RX ADMIN — SODIUM CHLORIDE, POTASSIUM CHLORIDE, SODIUM LACTATE AND CALCIUM CHLORIDE: 600; 310; 30; 20 INJECTION, SOLUTION INTRAVENOUS at 14:43

## 2024-10-23 RX ADMIN — SENNOSIDES AND DOCUSATE SODIUM 1 TABLET: 50; 8.6 TABLET ORAL at 19:51

## 2024-10-23 RX ADMIN — DEXMEDETOMIDINE HYDROCHLORIDE 12 MCG: 100 INJECTION, SOLUTION INTRAVENOUS at 12:12

## 2024-10-23 RX ADMIN — PROPOFOL 200 MG: 10 INJECTION, EMULSION INTRAVENOUS at 10:46

## 2024-10-23 RX ADMIN — PROPOFOL 50 MCG/KG/MIN: 10 INJECTION, EMULSION INTRAVENOUS at 10:49

## 2024-10-23 RX ADMIN — HYDROMORPHONE HYDROCHLORIDE 0.25 MG: 1 INJECTION, SOLUTION INTRAMUSCULAR; INTRAVENOUS; SUBCUTANEOUS at 11:35

## 2024-10-23 RX ADMIN — DEXAMETHASONE SODIUM PHOSPHATE 10 MG: 4 INJECTION, SOLUTION INTRAMUSCULAR; INTRAVENOUS at 10:58

## 2024-10-23 ASSESSMENT — ACTIVITIES OF DAILY LIVING (ADL)
ADLS_ACUITY_SCORE: 0

## 2024-10-23 NOTE — OR NURSING
"PACU to Inpatient Nursing Handoff    Patient Freeman Velazquez is a 64 year old male who speaks English.   Procedure Procedure(s):  ARTHROPLASTY, KNEE, TOTAL   Surgeon(s) Primary: David Ramirez MD  Assisting: Kimmy Sinha PA-C  Resident - Assisting: Jeremy Weinberg MD     Allergies   Allergen Reactions    Tetracycline Shortness Of Breath    Erythromycin        Isolation  No active isolations     Past Medical History   has a past medical history of Arthritis, Distal radius fracture (7/20/2011), GERD (gastroesophageal reflux disease), Kidney stone, and Renal colic.    Anesthesia General   Dermatome Level     Preop Meds acetaminophen (Tylenol) - time given: 0928   Nerve block Not applicable   Intraop Meds dexamethasone (Decadron)  dexmedetomidine (Precedex): 20 mcg total  fentanyl (Sublimaze): 100 mcg total  hydromorphone (Dilaudid): 1 mg total  ondansetron (Zofran): last given at 1205  TXA IV   Local Meds Yes - Local Cocktail (morphine, ropivacaine, epinephrine, Toradol)   Antibiotics cefazolin (Ancef) - last given at 1048     Pain Patient Currently in Pain: yes   PACU meds  fentanyl (Sublimaze): 50 mcg (total dose) last given at 1257   hydromorphone (Dilaudid): 0.4 mg (total dose) last given at 1525   Robaxin 500mg and 5mg oxycodone at 1355   PCA / epidural No   Capnography Respiratory Monitoring (EtCO2): 47 mmHg   Telemetry ECG Rhythm: Normal sinus rhythm   Inpatient Telemetry Monitor Ordered? No        Labs Glucose Lab Results   Component Value Date    GLC 62 10/09/2024    GLC 93 01/10/2020       Hgb Lab Results   Component Value Date    HGB 14.2 10/09/2024    HGB 15.3 03/08/2021       INR No results found for: \"INR\"   PACU Imaging Completed     Wound/Incision Incision/Surgical Site 10/23/24 Left Knee (Active)   Incision Assessment UTV 10/23/24 1320   Closure CHASE 10/23/24 1320   Dressing Intervention Clean, dry, intact 10/23/24 1320   Number of days: 0      CMS  Baseline numbness in R foot and L big toe    "   Equipment ice pack   Other LDA       IV Access Peripheral IV 10/23/24 Anterior;Left Hand (Active)   Site Assessment WDL 10/23/24 1245   Line Status Infusing 10/23/24 1245   Dressing Transparent 10/23/24 1245   Dressing Status clean;dry;intact 10/23/24 1245   Dressing Intervention New dressing  10/23/24 1015   Line Intervention Flushed 10/23/24 1015   Phlebitis Scale 0-->no symptoms 10/23/24 1245   Infiltration? no 10/23/24 1245   Number of days: 0      Blood Products Not applicable EBL 25 mL   Intake/Output Date 10/23/24 0700 - 10/24/24 0659   Shift 0950-6264 6630-0797 9049-3697 24 Hour Total   INTAKE   I.V. 750   750   Shift Total(mL/kg) 750(8.5)   750(8.5)   OUTPUT   Blood 25   25   Shift Total(mL/kg) 25(0.28)   25(0.28)   Weight (kg) 88.2 88.2 88.2 88.2      Drains / Caraballo     Time of void PreOp Time of Void Prior to Procedure: 0630 (10/23/24 0949)    PostOp      Diapered? No   Bladder Scan Bladder Scan Volume (mL): 350 ml (10/23/24 1300)   PO    ice chips     Vitals    B/P: 129/67  T: 97.6  F (36.4  C)    Temp src: Oral  P:  Pulse: 89 (10/23/24 1315)          R: 11  O2:  SpO2: 97 %    O2 Device: Nasal cannula (10/23/24 1315)    Oxygen Delivery: 6 LPM (10/23/24 1245)         Family/support present significant other   Patient belongings  2 bags   Patient transported on cart   DC meds/scripts (obs/outpt) Yes, meds meds brought to the floor   Inpatient Pain Meds Released? Yes       Special needs/considerations None   Tasks needing completion None       Yakelin Awan, RN  [facility]

## 2024-10-23 NOTE — ANESTHESIA POSTPROCEDURE EVALUATION
Patient: Freeman Velazquez    Procedure: Procedure(s):  ARTHROPLASTY, KNEE, TOTAL       Anesthesia Type:  General    Note:  Disposition: Admission   Postop Pain Control: Uneventful            Sign Out: Well controlled pain   PONV: No   Neuro/Psych: Uneventful            Sign Out: Acceptable/Baseline neuro status   Airway/Respiratory: Uneventful            Sign Out: Acceptable/Baseline resp. status   CV/Hemodynamics: Uneventful            Sign Out: Acceptable CV status; No obvious hypovolemia; No obvious fluid overload   Other NRE: NONE   DID A NON-ROUTINE EVENT OCCUR?            Last vitals:  Vitals Value Taken Time   /84 10/23/24 1415   Temp 36.4  C (97.6  F) 10/23/24 1311   Pulse 81 10/23/24 1420   Resp 17 10/23/24 1420   SpO2 98 % 10/23/24 1424   Vitals shown include unfiled device data.    Electronically Signed By: Phan Desir MD  October 23, 2024  2:25 PM

## 2024-10-23 NOTE — ANESTHESIA PROCEDURE NOTES
Airway       Patient location during procedure: OR       Procedure Start/Stop Times: 10/23/2024 10:54 AM  Staff -        Anesthesiologist:  Phan Desir MD       CRNA: Kendra Negron APRN CRNA       Performed By: CRNA  Consent for Airway        Urgency: elective  Indications and Patient Condition       Indications for airway management: maegan-procedural       Induction type:intravenous       Mask difficulty assessment: 1 - vent by mask    Final Airway Details       Final airway type: supraglottic airway    Supraglottic Airway Details        Type: LMA       Brand: Air-Q       LMA size: 5    Post intubation assessment        Placement verified by: capnometry, equal breath sounds and chest rise        Number of attempts at approach: 1       Secured with: tape       Ease of procedure: easy       Dentition: Intact and Unchanged    Medication(s) Administered   Medication Administration Time: 10/23/2024 10:54 AM

## 2024-10-23 NOTE — ANESTHESIA CARE TRANSFER NOTE
Patient: Freeman Velazquez    Procedure: Procedure(s):  ARTHROPLASTY, KNEE, TOTAL       Diagnosis: Primary osteoarthritis of left knee [M17.12]  Diagnosis Additional Information: No value filed.    Anesthesia Type:   General     Note:    Oropharynx: oropharynx clear of all foreign objects  Level of Consciousness: awake  Oxygen Supplementation: face mask  Level of Supplemental Oxygen (L/min / FiO2): 6  Independent Airway: airway patency satisfactory and stable  Dentition: dentition unchanged  Vital Signs Stable: post-procedure vital signs reviewed and stable  Report to RN Given: handoff report given  Patient transferred to: PACU    Handoff Report: Identifed the Patient, Identified the Reponsible Provider, Reviewed the pertinent medical history, Discussed the surgical course, Reviewed Intra-OP anesthesia mangement and issues during anesthesia, Set expectations for post-procedure period and Allowed opportunity for questions and acknowledgement of understanding      Vitals:  Vitals Value Taken Time   /84 10/23/24 1245   Temp 36.6  C (97.8  F) 10/23/24 1245   Pulse 101 10/23/24 1248   Resp 9 10/23/24 1248   SpO2 99 % 10/23/24 1248   Vitals shown include unfiled device data.    Electronically Signed By: MARK Chacon CRNA  October 23, 2024  12:49 PM

## 2024-10-23 NOTE — PROGRESS NOTES
Admission Note    Reason for admission: L TKA   Primary team notified of pt arrival.  Admitted from: PACU  Via: transport  Belongings: Placed in closet; valuables sent home with family  Teaching: Orientation to unit and call light- call light within reach, use of console, meal times, when to call for the RN, and enforced importance of safety.  IV Access: L IV infusing LR 75 ml/hr  Ht./Wt.: Completed    Pt status:     Temp:  [97.6  F (36.4  C)-98.2  F (36.8  C)] 98.2  F (36.8  C)  Pulse:  [62-94] 80  Resp:  [9-16] 16  BP: (114-160)/(67-84) 124/76  SpO2:  [91 %-100 %] 91 %    Baseline numbness and tingling in hands and feet.     Pt arrived today at 1440. Pt settled in the room.     Lung sounds clear and equal bilaterally.     Denies CP and SOB.     L knee dressing CDI    Last BM: 10/22    Activity: Ax2 with walker and GB. Not out of bed this shift.    Needs 2 RN skin check. Oncoming nurse notified.

## 2024-10-23 NOTE — OR NURSING
Pt unable to swallow TXA. If broken patient stated needing 8-10 ounces to drink with it. MD Desir notified and ordered to hold oral dose and IV dose will be given in OR.

## 2024-10-23 NOTE — ANESTHESIA PREPROCEDURE EVALUATION
Anesthesia Pre-Procedure Evaluation    Patient: Freeman Velazquez   MRN: [id number] : 1960        Procedure : Procedure(s):  ARTHROPLASTY, KNEE, TOTAL          Past Medical History:   Diagnosis Date    Arthritis     Distal radius fracture 2011    GERD (gastroesophageal reflux disease)     Kidney stone     Renal colic       Past Surgical History:   Procedure Laterality Date    ARTHROSCOPY KNEE BILATERAL      COLONOSCOPY  2013    Procedure: COLONOSCOPY;;  Surgeon: Lewis Metcalf MD;  Location: [facility]    COLONOSCOPY N/A 2019    Procedure: Combined Colonoscopy, Single Or Multiple Biopsy/Polypectomy By Biopsy;  Surgeon: Javier Judge MD;  Location: [facility]    COLONOSCOPY N/A 2021    Procedure: Colonoscopy, With Polypectomy And Biopsy;  Surgeon: Sasha Martinez DO;  Location: [facility]    COLONOSCOPY N/A 2024    Procedure: Colonoscopy;  Surgeon: Shemar Núñez MD;  Location: [facility] OR    COLONOSCOPY WITH CO2 INSUFFLATION N/A 2019    Procedure: COLONOSCOPY WITH CO2 INSUFFLATION;  Surgeon: Javier Judge MD;  Location: [facility]    COLONOSCOPY WITH CO2 INSUFFLATION N/A 2021    Procedure: COLONOSCOPY, WITH CO2 INSUFFLATION;  Surgeon: Cedrick Sheikh DO;  Location: [facility]    COLONOSCOPY WITH CO2 INSUFFLATION N/A 2021    Procedure: COLONOSCOPY, WITH CO2 INSUFFLATION;  Surgeon: Sasha Martinez DO;  Location: [facility]    COMBINED CYSTOSCOPY, RETROGRADES, URETEROSCOPY, INSERT STENT Bilateral 2024    Procedure: Left Ureteroscopy, Retrograde Pyelogram, Bilateral Ureteral Stent Insertion;  Surgeon: Umair Cheung MD;  [facility]: [facility]    COMBINED CYSTOSCOPY, RETROGRADES, URETEROSCOPY, LASER HOLMIUM LITHOTRIPSY URETER(S), INSERT STENT Bilateral 2024    Procedure: Cystoscopy, Bilateral retrograde pyelogram, Bilateral ureteroscopy with stone extraction, Left laser lithotripsy, left ureteral stent exchange, right stent removal;  Surgeon: Umair Cheung MD;  [facility]    HC CYSTOSCOPY,INSERT URETERAL STENT Right 1/21/2020    Procedure: CYSTOSCOPY, WITH FLEXIBLE URETEROSCOPY, RETROGRADES, AND STENT INSERTION RIGHT;  Surgeon: Wil Alejo MD;  Location: [facility];  Service: Urology    KNEE SURGERY Bilateral       Allergies   Allergen Reactions    Tetracycline Shortness Of Breath    Erythromycin       Social History     Tobacco Use    Smoking status: Never    Smokeless tobacco: Never   Substance Use Topics    Alcohol use: Yes     Comment: rare, one drink in 6 months      Wt Readings from Last 1 Encounters:   10/09/24 86.2 kg (190 lb)        Anesthesia Evaluation            ROS/MED HX  ENT/Pulmonary:  - neg pulmonary ROS     Neurologic:  - neg neurologic ROS     Cardiovascular:  - neg cardiovascular ROS     METS/Exercise Tolerance: >4 METS    Hematologic:  - neg hematologic  ROS     Musculoskeletal:  - neg musculoskeletal ROS (+)  arthritis,             GI/Hepatic:  - neg GI/hepatic ROS   (+) GERD, Asymptomatic on medication,                  Renal/Genitourinary:  - neg Renal ROS   (+)        BPH,   Chronic renal disease: KARINE from stones.   Endo:  - neg endo ROS     Psychiatric/Substance Use:  - neg psychiatric ROS     Infectious Disease:  - neg infectious disease ROS     Malignancy:  - neg malignancy ROS     Other:  - neg other ROS          Physical Exam    Airway        Mallampati: II       Respiratory Devices and Support         Dental       (+) Minor Abnormalities - some fillings, tiny chips      Cardiovascular          Rhythm and rate: regular     Pulmonary                   OUTSIDE LABS:  CBC:   Lab Results   Component Value Date    WBC 4.9 10/09/2024    WBC 5.6 06/03/2024    HGB 14.2 10/09/2024    HGB 14.8 06/03/2024    HCT 42.6 10/09/2024    HCT 44.8 06/03/2024     10/09/2024     06/03/2024     BMP:   Lab Results   Component Value Date     10/09/2024     06/03/2024    POTASSIUM 4.2 10/09/2024    POTASSIUM 4.0 06/03/2024     "CHLORIDE 106 10/09/2024    CHLORIDE 103 06/03/2024    CO2 26 10/09/2024    CO2 27 06/03/2024    BUN 16.1 10/09/2024    BUN 17.4 06/03/2024    CR 1.06 10/09/2024    CR 1.23 (H) 06/03/2024    GLC 62 (L) 10/09/2024     (H) 06/03/2024     COAGS: No results found for: \"PTT\", \"INR\", \"FIBR\"  POC: No results found for: \"BGM\", \"HCG\", \"HCGS\"  HEPATIC:   Lab Results   Component Value Date    ALBUMIN 4.1 03/08/2021    PROTTOTAL 7.7 03/08/2021    ALT 32 03/08/2021    AST 23 03/08/2021    ALKPHOS 103 03/08/2021    BILITOTAL 0.9 03/08/2021     OTHER:   Lab Results   Component Value Date    MARIAA 8.9 10/09/2024    LIPASE 84 03/08/2021    AMYLASE 44 01/04/2018    TSH 0.66 03/08/2021       Anesthesia Plan    ASA Status:  2    NPO Status:  NPO Appropriate    Anesthesia Type: General.     - Airway: LMA   Induction: Intravenous.   Maintenance: TIVA.        Consents    Anesthesia Plan(s) and associated risks, benefits, and realistic alternatives discussed. Questions answered and patient/representative(s) expressed understanding.     - Discussed:     - Discussed with:  Patient            Postoperative Care    Pain management: Oral pain medications, IV analgesics, Multi-modal analgesia.        Comments:               Phan Desir MD    I have reviewed the pertinent notes and labs in the chart from the past 30 days and (re)examined the patient.  Any updates or changes from those notes are reflected in this note.                                 "

## 2024-10-23 NOTE — BRIEF OP NOTE
Orthopaedic Surgery Brief Op-Note      Patient: Freeman Velazquez  : 1960  Date of Service: 10/23/2024 12:51 PM    Pre-operative Diagnosis: Primary osteoarthritis of left knee [M17.12]  Post-operative Diagnosis: same    Procedure(s) Performed: Procedure(s):  ARTHROPLASTY, KNEE, TOTAL    Surgeons and Role:     * David Ramirez MD - Primary     * Kimmy Sinha PA-DEION - Assisting     * Jeremy Weinberg MD - Resident - Assisting    Anesthesia: General, spinal  EBL: 25 ml      Implants:   Implant Name Type Inv. Item Serial No.  Lot No. LRB No. Used Action   BONE CEMENT SIMPLEX FULL DOSE 6191-1-001 - [id number] Cement, Bone BONE CEMENT SIMPLEX FULL DOSE 6191-1-001  [employer] [id number] Left 1 Implanted   BONE CEMENT SIMPLEX FULL DOSE 6191-1-001 - [id number] Cement, Bone BONE CEMENT SIMPLEX FULL DOSE 6191-1-001  [employer] [id number] Left 1 Implanted   IMP COMP FEMORAL S&N JOSEMANUEL II NP CR SZ 7 LT [id number] - [id number] Total Joint Component/Insert IMP COMP FEMORAL S&N JOSEMANUEL II NP CR SZ 7 LT [id number]  [employer] [id number] Left 1 Implanted   IMP BASEPLATE TIBIAL JOSEMANUEL II SZ 7 LT TI [id number] - [id number] Total Joint Component/Insert IMP BASEPLATE TIBIAL JOSEMANUEL II SZ 7 LT TI [id number]  [employer]-R [id number] Left 1 Implanted   IMP INSERT ARTICULAR S&N LGN CR XLPE SZ7-8 9MM [id number] - [id number] Total Joint Component/Insert IMP INSERT ARTICULAR S&N LGN CR XLPE SZ7-8 9MM [id number]  [employer] [id number] Left 1 Implanted         Intra-op Labs/Cxs/Specimens:   * No specimens in log *    Drains: none  Complications: No apparent complications during procedure  Findings: Please see dictated operative note for details    Disposition: Stable to PACU, then admit to Orthopaedics    POST OPERATIVE PLAN    Assessment/Plan: Freeman Velazquez is a 64 year old male s/p Procedure(s):  ARTHROPLASTY, KNEE, TOTAL on 10/23/2024 with Dr. Ramirez.    Activity: Up with assist and assistive  devices as needed until independent. Knee ROM as tolerated.   Weight bearing status: WBAT    Antibiotics: Cefazolin x 24 hours  Diet: Begin with clear fluids and progress diet as tolerated. Bowel regimen. Anti-emetics PRN.    DVT prophylaxis:  ASA 81 mg BID x 4 weeks   Elevation: Elevate heels off of bed on pillows, no pillows behind the knee at any time    Wound Care: Tegaderm and alginate x 2 weeks   Drains: none  Pain management: Orals PRN, IV for breakthrough only  X-rays: AP/Lat operative knee XR in PACU.  Physical Therapy: Mobilization, ROM, ADL's  Occupational Therapy: ADL's  Labs: Trend Hgb on PODs #1 & 2  Cultures: None  Consults: PT, OT. Hospitalist, appreciate assistance in caring for this patient throughout the perioperative period       Future Appointments   Date Time Provider Department [place]   10/24/2024  8:45 AM Vishnu Dutta OTR [facility] [place]   10/29/2024 11:20 AM Sonu Shane PT [facility]   11/1/2024  1:20 PM Luis Fernando Lambert [facility]   11/5/2024 11:20 AM Luis Fernando Lambert [facility]   11/5/2024  3:20 PM Maryjane Castelan PA-C [facility]   11/7/2024  1:10 PM Luis Fernando Lambert [facility]   11/12/2024 11:20 AM Luis Fernando Lambert [facility]   11/14/2024  1:10 PM Luis Fernando Lambert [facility]   11/19/2024 11:20 AM Luis Fernando Lambert [facility]   11/21/2024 11:20 AM Luis Fernando Lambert [facility]   11/26/2024 11:20 AM Luis Fernando Lambert [facility]   12/2/2024 11:20 AM Luis Fernando Lambert [facility]   12/3/2024  9:20 AM David Ramirez MD [facility]   12/5/2024 11:20 AM Luis Fernando Lambert [facility]   3/13/2025  9:15 AM Nora Myers PA-C [facility]       Disposition: Pending progress with therapies, pain control on orals, and medical stability, anticipate discharge to Home vs TCU on POD #1-2.    I assisted with positioning, prepping and draping, and closure.      Kimym Sinha PA-C  10/23/2024 12:51 PM  Orthopedic Surgery

## 2024-10-23 NOTE — PROGRESS NOTES
Pt AOX4 calm and cooperative, VSS report pain of 5/10 to L knee, ice pack to knee. Pt walked with PT in hallway, L PIV  75ml/hr NS.

## 2024-10-23 NOTE — OR NURSING
Dr Faulkner for pain assessment. Pt has been apnic with narcotic doses and the meds have not been effective in managing the pain.Pt currently rating pain 7/10 which is up from 5/10. Pt denies nausea and is so far tolerating po. Will continue to monitor pain status closely.

## 2024-10-23 NOTE — CONSULTS
[facility]  Consult Note - Hospitalist Service  Date of Admission:  10/23/2024  Consult Requested by: Kimmy Sinha PA-C   Reason for Consult: Perioperative medical comanagement    Assessment & Plan   Freeman Velazquez is a 64 year old male admitted on 10/23/2024 for a left TKA. He has a past medical history notable for osteoarthritis of both knees, distal radius fracture, GERD, kidney stones, and BPH. Medicine has been consulted for medical co-management.    # S/p left TKA (10/23/24):  # Hx of osteoarthritis of B/L knees:   EBL of 25mL. Cares per primary team, Orthopedics.   Activity: Up with assist and assistive devices as needed until independent. Knee ROM as tolerated.   Weight bearing status: WBAT    Antibiotics: Cefazolin x 24 hours  Diet: Begin with clear fluids and progress diet as tolerated. Bowel regimen. Anti-emetics PRN.    DVT prophylaxis:  ASA 81 mg BID x 4 weeks   Elevation: Elevate heels off of bed on pillows, no pillows behind the knee at any time    Wound Care: Tegaderm and alginate x 2 weeks   Drains: none  Pain management: Orals PRN, IV for breakthrough only  X-rays: AP/Lat operative knee XR in PACU.  Physical Therapy: Mobilization, ROM, ADL's  Occupational Therapy: ADL's  Labs: Trend Hgb on PODs #1 & 2  Cultures: None  Consults: PT, OT. Hospitalist, appreciate assistance in caring for this patient throughout the perioperative period     # GERD, asymptomatic:  Not on any medications prior to admission.   -Monitor     # Hx of kidney stones:  # BPH:  History of a weak stream. Noted during pre-op exam. UA/UCx without s/s of infection. BMP WNL. No PSA on file since 2016 at 1.20. Not on any medications for BPH.   - Monitor UO  - Voiding protocol per nursing protocol      The patient's care was discussed with the Bedside Nurse, Patient, and Primary team (via this note).    Clinically Significant Risk Factors Present on Admission                       MARK Alaniz [facility]  Hospitalist Service  Securely message with [employer] (more info)  Text page via [facility] Paging/Directory     This chart documentation was completed with Dragon voice-recognition software. Even though reviewed, this chart may still contain some grammatical, spelling, and word errors. Please contact the author for any questions or clarifications.     ______________________________________________________________________    Chief Complaint   Perioperative medical co-management after left TKA    History is obtained from the patient and per chart review.     History of Present Illness   Freeman Velazquez is a 64 year old male who presents for left TKA. He has a past medical history notable for osteoarthritis of both knees, distal radius fracture, GERD, kidney stones, and BPH. Medicine has been consulted for medical co-management.    Upon interview and exam, patient reporting pain under control. Has not voided yet. On no prescribed medication OP besides ibuprofen, Miralax. No urination since 0600. Nursing to bladder scan. Denies any headaches, fevers, dysphagia, GERD s/s, SOB, chest pain, N/V, abdominal pain, dysuria, back pain or leg swelling.      Past Medical History    Past Medical History:   Diagnosis Date    Arthritis     Distal radius fracture 7/20/2011    GERD (gastroesophageal reflux disease)     Kidney stone     Renal colic        Past Surgical History   Past Surgical History:   Procedure Laterality Date    ARTHROSCOPY KNEE BILATERAL      COLONOSCOPY  4/16/2013    Procedure: COLONOSCOPY;;  Surgeon: Lewis Metcalf MD;  Location: [facility]    COLONOSCOPY N/A 1/24/2019    Procedure: Combined Colonoscopy, Single Or Multiple Biopsy/Polypectomy By Biopsy;  Surgeon: Javier Judge MD;  Location: [facility]    COLONOSCOPY N/A 4/6/2021    Procedure: Colonoscopy, With Polypectomy And Biopsy;  Surgeon: Sasha Martinez DO;  Location: [facility]    COLONOSCOPY N/A 4/12/2024    Procedure: Colonoscopy;   Surgeon: Shemar Núñez MD;  Location: [facility] OR    COLONOSCOPY WITH CO2 INSUFFLATION N/A 1/24/2019    Procedure: COLONOSCOPY WITH CO2 INSUFFLATION;  Surgeon: Javier Judge MD;  Location: [facility]    COLONOSCOPY WITH CO2 INSUFFLATION N/A 4/5/2021    Procedure: COLONOSCOPY, WITH CO2 INSUFFLATION;  Surgeon: Cedrick Sheikh DO;  Location: [facility]    COLONOSCOPY WITH CO2 INSUFFLATION N/A 4/6/2021    Procedure: COLONOSCOPY, WITH CO2 INSUFFLATION;  Surgeon: Sasha Martinez DO;  Location: [facility]    COMBINED CYSTOSCOPY, RETROGRADES, URETEROSCOPY, INSERT STENT Bilateral 6/5/2024    Procedure: Left Ureteroscopy, Retrograde Pyelogram, Bilateral Ureteral Stent Insertion;  Surgeon: Umair Cheung MD;  [facility]    COMBINED CYSTOSCOPY, RETROGRADES, URETEROSCOPY, LASER HOLMIUM LITHOTRIPSY URETER(S), INSERT STENT Bilateral 6/19/2024    Procedure: Cystoscopy, Bilateral retrograde pyelogram, Bilateral ureteroscopy with stone extraction, Left laser lithotripsy, left ureteral stent exchange, right stent removal;  Surgeon: Umair Cheung MD;  [facility]    HC CYSTOSCOPY,INSERT URETERAL STENT Right 1/21/2020    Procedure: CYSTOSCOPY, WITH FLEXIBLE URETEROSCOPY, RETROGRADES, AND STENT INSERTION RIGHT;  Surgeon: Wil Alejo MD;  Location: [facility];  Service: Urology    KNEE SURGERY Bilateral        Medications   I have reviewed this patient's current medications  Facility-Administered Medications Prior to Admission   Medication Dose Route Frequency Provider Last Rate Last Admin    lidocaine 1 % injection 5 mL  5 mL   Darren Jordan MD   5 mL at 08/17/20 1403    methylPREDNISolone (DEPO-MEDROL) injection 80 mg  80 mg   Darren Jordan MD   80 mg at 08/17/20 1403     Medications Prior to Admission   Medication Sig Dispense Refill Last Dose/Taking    ibuprofen (ADVIL/MOTRIN) 200 MG tablet Take 200 mg by mouth every 4 hours as needed for pain   Past Week    polyethylene glycol (MIRALAX) 17  GM/Dose powder Take 17-34 g (1-2 Capfuls) by mouth daily (Patient taking differently: Take 1-2 Capfuls by mouth daily as needed.)   More than a month          Review of Systems    The 10 point Review of Systems is negative other than noted in the HPI or here.     Social History   I have reviewed this patient's social history and updated it with pertinent information if needed.  Social History     Tobacco Use    Smoking status: Never    Smokeless tobacco: Never   Vaping Use    Vaping status: Never Used   Substance Use Topics    Alcohol use: Yes     Comment: rare, one drink in 6 months    Drug use: Not Currently         Allergies   Allergies   Allergen Reactions    Tetracycline Shortness Of Breath    Erythromycin         Physical Exam   Vital Signs: Temp: 98.2  F (36.8  C) Temp src: Oral BP: (!) 140/68 Pulse: 80   Resp: 16 SpO2: 95 % O2 Device: None (Room air) Oxygen Delivery: 2 LPM  Weight: 194 lbs 7.13 oz    General Appearance: In NAD, sitting in bed  Respiratory: LS clear b/l, normal RR  Cardiovascular: S1, S2, no m/r/g  GI: BS+, all 4 quadrants, no masses, non-tender upon palpation  Skin/MSK: Intact on face, arms, legs except left TKA with ACE bandage. Left foot, warm and perfused. No other wounds, bruising, or lesions noted.  Neuropsych:A&Ox4, moving all extremities    Medical Decision Making       60 MINUTES SPENT BY ME on the date of service doing chart review, history, exam, documentation & further activities per the note.      Data         Imaging results reviewed over the past 24 hrs:   Recent Results (from the past 24 hours)   XR Knee Port Left 1/2 Views    Narrative    EXAM: XR KNEE PORT LEFT 1/2 VIEWS  LOCATION: [facility]  DATE: 10/23/2024    INDICATION: Post Op Total Knee  COMPARISON: 12/8/2023      Impression    IMPRESSION: Status post recent left total knee arthroplasty. No immediate hardware complication. Expected postsurgical soft tissue swelling,  subcutaneous emphysema, and gas containing joint effusion. No acute fracture or malalignment.

## 2024-10-23 NOTE — PROGRESS NOTES
[facility]  OUTPATIENT PHYSICAL THERAPY EVALUATION  PLAN OF TREATMENT FOR OUTPATIENT REHABILITATION  (COMPLETE FOR INITIAL CLAIMS ONLY)  Patient's Last Name, First Name, M.I.  YOB: 1960  Freeman Velazquez                        Provider's Name  [facility] Medical Record No.  [id number]                             Onset Date:  10/23/24   Start of Care Date:  10/23/24   Type:     _X_PT   ___OT   ___SLP Medical Diagnosis:  s/p L TKA              PT Diagnosis:  impaired functional mobility Visits from SOC:  1     See note for plan of treatment, functional goals and certification details    I CERTIFY THE NEED FOR THESE SERVICES FURNISHED UNDER        THIS PLAN OF TREATMENT AND WHILE UNDER MY CARE     (Physician co-signature of this document indicates review and certification of the therapy plan).               10/23/24 1600   Appointment Info   Signing Clinician's Name / Credentials (PT) WILL Munoz   Student Supervision On-site supervision provided;Line of sight supervision provided;Therapy services provided with the co-signing licensed therapist guiding and directing the services, and providing the skilled judgement and assessment throughout the session   Quick Adds   Quick Adds Certification   Living Environment   People in Home spouse   Current Living Arrangements house   Home Accessibility stairs to enter home   Number of Stairs, Main Entrance 3   Stair Railings, Main Entrance none  (brick pillar)   Transportation Anticipated family or friend will provide   Living Environment Comments Pt lives in a house with his wife with 3 stairs to enter. All needs including bedroom and bathroom are on the main floor.   Self-Care   Usual Activity Tolerance good   Current Activity Tolerance moderate   Regular Exercise Yes   Activity/Exercise Type walking;team sports   Exercise Amount/Frequency daily   Equipment Currently Used at Home cane,  straight;shower chair;crutches;walker, standard;wheelchair, manual   Fall history within last six months no   Activity/Exercise/Self-Care Comment Pt PLOF independent w/ mobility and self cares.   General Information   Onset of Illness/Injury or Date of Surgery 10/23/24   Referring Physician Kimmy Sinha PA-C   Patient/Family Therapy Goals Statement (PT) to walk and do stairs   Pertinent History of Current Problem (include personal factors and/or comorbidities that impact the POC) Pt is a 64 year old male admitted on 10/23/2024 for a left TKA. He has a past medical history notable for osteoarthritis of both knees, distal radius fracture, GERD, kidney stones, and BPH.   Existing Precautions/Restrictions fall;other (see comments)  (no pillow under knee)   Weight-Bearing Status - LLE weight-bearing as tolerated   General Observations Pt greeted supine in bed w/ wife present, agreeable to participate in PT.   Cognition   Affect/Mental Status (Cognition) WNL   Orientation Status (Cognition) oriented x 4   Follows Commands (Cognition) WNL   Pain Assessment   Patient Currently in Pain Yes, see Vital Sign flowsheet   Integumentary/Edema   Integumentary/Edema Comments surgical incision L knee covered w/ dressing and ace wrap   Posture    Posture Not impaired   Range of Motion (ROM)   Range of Motion ROM deficits secondary to surgical procedure;ROM deficits secondary to pain   Strength (Manual Muscle Testing)   Strength (Manual Muscle Testing) Able to perform L SLR   Bed Mobility   Comment, (Bed Mobility) supine > sit w/ HOB elevated w/ SBA, vc to go slow and controlled   Transfers   Comment, (Transfers) sit > stand from EOB to FWW w/ SBA   Gait/Stairs (Locomotion)   Comment, (Gait/Stairs) amb 15' w/ FWW w/ SBA   Balance   Balance Comments Benefits from B UE support on walker for dynamic mobility; balance strategies reduced due to L LE pain and post op weakness   Sensory Examination   Sensory Perception patient reports no  sensory changes   Clinical Impression   Criteria for Skilled Therapeutic Intervention Yes, treatment indicated   PT Diagnosis (PT) impaired functional mobility   Influenced by the following impairments increased pain, decreased strength, decreased ROM 2/2 surgical procedure, decreased acitvity tolerance   Functional limitations due to impairments bed mobility, transfer, amb, stair   Clinical Presentation (PT Evaluation Complexity) stable   Clinical Presentation Rationale per clinical judgement   Clinical Decision Making (Complexity) low complexity   Planned Therapy Interventions (PT) balance training;bed mobility training;gait training;home exercise program;patient/family education;stair training;strengthening;transfer training;progressive activity/exercise;risk factor education   Risk & Benefits of therapy have been explained evaluation/treatment results reviewed;care plan/treatment goals reviewed;risks/benefits reviewed;current/potential barriers reviewed;participants voiced agreement with care plan;participants included;patient;spouse/significant other   PT Total Evaluation Time   PT Eval, Low Complexity Minutes (84549) 15   Therapy Certification   Start of care date 10/23/24   Certification date from 10/23/24   Certification date to 10/30/24   Medical Diagnosis s/p L TKA   Physical Therapy Goals   PT Frequency Daily   PT Predicted Duration/Target Date for Goal Attainment 10/24/24   PT Goals Bed Mobility;Transfers;Gait;Stairs   PT: Bed Mobility Independent;Supine to/from sit   PT: Transfers Modified independent;Sit to/from stand;Assistive device   PT: Gait Modified independent;Assistive device;Standard walker;Greater than 200 feet   PT: Stairs Supervision/stand-by assist;Assistive device;3 stairs   Interventions   Interventions Quick Adds Therapeutic Activity;Therapeutic Procedure;Gait Training   Therapeutic Procedure/Exercise   Ther. Procedure: strength, endurance, ROM, flexibillity Minutes (71391) 10   Symptoms  Noted During/After Treatment increased pain   Treatment Detail/Skilled Intervention Pt instructed in supine exercises to increase LE strength/endurance: ankle pumps, quad sets w/ tc to increase muscle activation, heel slides w/ Hollie>no assist, HS sets, SAQ, SLR x 10 reps L LE. Educated pt on performing HEP daily until initiating OP PT, pt verbalizes understanding.   Therapeutic Activity   Therapeutic Activities: dynamic activities to improve functional performance Minutes (25756) 10   Symptoms Noted During/After Treatment Fatigue;Increased pain   Treatment Detail/Skilled Intervention Pt greeted supine in bed w/ spouse present, agreeable to participate in PT. Increased session time 2/2 line management. Eductaed pt on WBAT status and to not rest with pillow under knee, pt and spouse verbalize understanding. Pt transfers stand > sit at EOB w/ FWW w/ SBA, pt demonstrates decreased eccentric control, vc to reach back and keep L LE extended in front of him to control descent, pt demonstrates improved control w/ transfers throughout session. Pt transfers sit > supine w/ SBA and bed flat to simulate home environment. Pt left supine in bed w/ ice pack on knee, spouse present, and all needs within reach.   Gait Training   Gait Training Minutes (54627) 10   Symptoms Noted During/After Treatment (Gait Training) fatigue;increased pain   Treatment Detail/Skilled Intervention Educated pt and spouse on proper FWW and cane height for them to adjust at home. Pt adjusts FWW for proper height. Pt amb 150' w/ FWW w/ SBA, vc to step w/ L LE first, pt able to demonstrate steady and reciprocal gait pattern.   PT Discharge Planning   PT Plan amb w/ FWW, stairs w/ cane w/o railing   PT Discharge Recommendation (DC Rec) other (see comments)  (defer to ortho)   PT Rationale for DC Rec Pt is currently below basleine function. Pt lives in a house w/ his wife who can provide support. Pt requires assist w/ mobility and has not attempted stairs.  Anticipate pt will be ready to DC home w/ assist from spouse 10/24/2024.   PT Brief overview of current status SBA bed mobility, transfers, and amb w/ FWW   Physical Therapy Time and Intention   Timed Code Treatment Minutes 30   Total Session Time (sum of timed and untimed services) 45

## 2024-10-24 ENCOUNTER — APPOINTMENT (OUTPATIENT)
Dept: PHYSICAL THERAPY | Facility: CLINIC | Age: 64
End: 2024-10-24
Attending: ORTHOPAEDIC SURGERY
Payer: MEDICARE

## 2024-10-24 VITALS
WEIGHT: 194.45 LBS | HEIGHT: 74 IN | TEMPERATURE: 98.2 F | BODY MASS INDEX: 24.95 KG/M2 | RESPIRATION RATE: 16 BRPM | OXYGEN SATURATION: 97 % | SYSTOLIC BLOOD PRESSURE: 112 MMHG | DIASTOLIC BLOOD PRESSURE: 64 MMHG | HEART RATE: 79 BPM

## 2024-10-24 LAB
GLUCOSE BLDC GLUCOMTR-MCNC: 147 MG/DL (ref 70–99)
HGB BLD-MCNC: 12.8 G/DL (ref 13.3–17.7)
HOLD SPECIMEN: NORMAL

## 2024-10-24 PROCEDURE — 97110 THERAPEUTIC EXERCISES: CPT | Mod: GP | Performed by: PHYSICAL THERAPIST

## 2024-10-24 PROCEDURE — 36415 COLL VENOUS BLD VENIPUNCTURE: CPT | Performed by: PHYSICIAN ASSISTANT

## 2024-10-24 PROCEDURE — 82962 GLUCOSE BLOOD TEST: CPT

## 2024-10-24 PROCEDURE — 97116 GAIT TRAINING THERAPY: CPT | Mod: GP | Performed by: PHYSICAL THERAPIST

## 2024-10-24 PROCEDURE — 250N000011 HC RX IP 250 OP 636: Mod: JZ | Performed by: PHYSICIAN ASSISTANT

## 2024-10-24 PROCEDURE — 250N000013 HC RX MED GY IP 250 OP 250 PS 637: Performed by: PHYSICIAN ASSISTANT

## 2024-10-24 PROCEDURE — 999N000111 HC STATISTIC OT IP EVAL DEFER

## 2024-10-24 PROCEDURE — 85018 HEMOGLOBIN: CPT | Performed by: PHYSICIAN ASSISTANT

## 2024-10-24 RX ADMIN — POLYETHYLENE GLYCOL 3350 17 G: 17 POWDER, FOR SOLUTION ORAL at 08:36

## 2024-10-24 RX ADMIN — ACETAMINOPHEN 975 MG: 325 TABLET, FILM COATED ORAL at 02:26

## 2024-10-24 RX ADMIN — METHOCARBAMOL 500 MG: 500 TABLET ORAL at 08:47

## 2024-10-24 RX ADMIN — ACETAMINOPHEN 975 MG: 325 TABLET, FILM COATED ORAL at 08:36

## 2024-10-24 RX ADMIN — ASPIRIN 81 MG: 81 TABLET, COATED ORAL at 08:36

## 2024-10-24 RX ADMIN — CEFAZOLIN SODIUM 2 G: 2 INJECTION, SOLUTION INTRAVENOUS at 02:54

## 2024-10-24 RX ADMIN — OXYCODONE HYDROCHLORIDE 5 MG: 5 TABLET ORAL at 02:26

## 2024-10-24 RX ADMIN — OXYCODONE HYDROCHLORIDE 5 MG: 5 TABLET ORAL at 10:43

## 2024-10-24 RX ADMIN — SENNOSIDES AND DOCUSATE SODIUM 1 TABLET: 50; 8.6 TABLET ORAL at 08:36

## 2024-10-24 RX ADMIN — OXYCODONE HYDROCHLORIDE 5 MG: 5 TABLET ORAL at 06:18

## 2024-10-24 ASSESSMENT — ACTIVITIES OF DAILY LIVING (ADL)
ADLS_ACUITY_SCORE: 0

## 2024-10-24 NOTE — PROGRESS NOTES
"Orthopedic Surgery Progress Note 10/24/2024    S:  No acute events overnight. Pain controlled. Had difficulty urinating last night requiring straight cath, but was able to void twice this morning. Denies any new numbness. Mobilized well with PT, should go home after AM session.    O:  Temp: 98.6  F (37  C) Temp src: Oral BP: 129/75 Pulse: 76   Resp: 17 SpO2: 96 % O2 Device: None (Room air) Oxygen Delivery: 2 LPM    Exam:  Gen: alert and oriented, responds to questions appropriately  Resp: non-labored on RA  MSK:  LLE:  - Dressings c/d/i  - SILT femoral/tibial/sural/saphenous/DP/SP nerves  - Fires quad, TA, EHL, FHL, GaSC  - DP pulse, foot wwp      No lab results found in last 7 days.    Invalid input(s): \"SEDRATE\"      Assessment/Plan: Freeman Velazquez is a 64 year old male s/p Procedure(s):  ARTHROPLASTY, KNEE, TOTAL on 10/23/2024 with Dr. Ramirez.     Activity: Up with assist and assistive devices as needed until independent. Knee ROM as tolerated.   Weight bearing status: WBAT    Antibiotics: Cefazolin x 24 hours  Diet: Begin with clear fluids and progress diet as tolerated. Bowel regimen. Anti-emetics PRN.    DVT prophylaxis:  ASA 81 mg BID x 4 weeks   Elevation: Elevate heels off of bed on pillows, no pillows behind the knee at any time    Wound Care: Tegaderm and alginate x 2 weeks   Drains: none  Pain management: Orals PRN, IV for breakthrough only  X-rays: AP/Lat operative knee XR in PACU.  Physical Therapy: Mobilization, ROM, ADL's  Occupational Therapy: ADL's  Labs: Trend Hgb on PODs #1 & 2  Cultures: None  Consults: PT, OT. Hospitalist, appreciate assistance in caring for this patient throughout the perioperative period     Disposition: Pending progress with therapies, pain control on orals, and medical stability. Anticipate discharge to home on POD #1.  Follow-up: Clinic with Dr. Ramirez team in 2 weeks.    Orthopedic surgery staff for this patient is Dr. Ramirez. Discussed.    --  Jeremy Weinberg, " MD  Orthopedic Surgery PGY-4

## 2024-10-24 NOTE — PROGRESS NOTES
Pt. discharged at 11:25 to home, and left with personal belongings. Pt. received complete discharge paperwork and discharge medications as filled by discharge pharmacy. Pt received and signed for the narcotic medication oxycodone. Pt. was given times of last dose for all discharge medications in writing on discharge medication sheets. Discharge teaching included oxycodone medication, pain management, activity restrictions, dressing changes, and signs and symptoms of infection. . Pt. to follow up with surgeon team in 2 weeks. Pt. had no further questions at the time of discharge and no unmet needs were identified.     Patient was also given stoplight tool at time of discharge.

## 2024-10-24 NOTE — PHARMACY-ADMISSION MEDICATION HISTORY
Pharmacist Admission Medication History    Admission medication history is complete. The information provided in this note is only as accurate as the sources available at the time of the update.    Information Source(s): Patient and CareEverywhere/SureScripts via phone    Pertinent Information:   Patient reports only taking ibuprofen or aleve as needed- Confirmed he does not take them at the same time but will alternate both medications. Usually prefers Aleve if he's going to choose just one and will usually just take it once per day  Aspirin, oxycodone, Senna, and acetaminophen listed below are all discharge medications.    Changes made to PTA medication list:  Added: naproxen (per patient)  Deleted: None  Changed:   Miralax 17-34 g daily PRN--> 17 g daily PRN (per patient)     Allergies reviewed with patient and updates made in EHR: yes    Medication History Completed By: Geneva Lazar [facility] 10/23/2024 8:08 PM    PTA Med List   Medication Sig Last Dose/Taking    acetaminophen (TYLENOL) 325 MG tablet Take 2 tablets (650 mg) by mouth every 4 hours as needed for other (mild pain). Taking As Needed    aspirin 81 MG EC tablet Take 1 tablet (81 mg) by mouth 2 times daily. Taking    ibuprofen (ADVIL/MOTRIN) 200 MG tablet Take 400 mg by mouth every 4 hours as needed for pain. Past Week    naproxen sodium 220 MG capsule Take 220-440 mg by mouth daily as needed (pain). Past Week    oxyCODONE (ROXICODONE) 5 MG tablet Take 1-2 tablets (5-10 mg) by mouth every 4 hours as needed for moderate to severe pain. Taking As Needed    polyethylene glycol (MIRALAX) 17 GM/Dose powder Take 1 Capful by mouth daily as needed for constipation. Past Month    senna-docusate (SENOKOT-S/PERICOLACE) 8.6-50 MG tablet Take 1-2 tablets by mouth 2 times daily as needed for constipation. Take while on oral narcotics to prevent or treat constipation. Taking As Needed

## 2024-10-24 NOTE — PLAN OF CARE
Physical Therapy Discharge Summary    Reason for therapy discharge:    All goals and outcomes met, no further needs identified.    Progress towards therapy goal(s). See goals on Care Plan in [facility] electronic health record for goal details.  Goals met    Therapy recommendation(s):    Continued therapy is recommended.  Rationale/Recommendations:  progress IND gait, increase L LE strength and ROM.

## 2024-10-24 NOTE — PROGRESS NOTES
Primary RN asked for assistance with a straight cath as the first attempt was unsuccessful. This nurse attempted and was unable to get urine; bright red blood filled the straight catheter tubing; catheter removed and small scant amount of blood on the end of the urethra. CC flyer called for assistance, advice given to primary RN to page hospitalist for further direction.

## 2024-10-24 NOTE — OP NOTE
OPERATIVE REPORT    DATE OF SERVICE: 10/23/24    SURGEON: David Ramirez MD.    ASSISTANT(S):  Kimmy Sinha PA-C. The assistance of the Physician Assistant was necessary for positioning, draping, retraction, leg positioning for placement of implants, and layered closure. There was no qualified available resident or fellow who was aware of the intricies of the procedure and requirements of all portions of the procedure.  Jeremy Weinberg MD     PREOPERATIVE DIAGNOSIS:  Osteoarthritis    POSTOPERATIVE DIAGNOSIS:  Osteoarthritis    OPERATION PERFORMED:  left  total knee arthroplasty    IMPLANTS:    Implant Name Type Inv. Item Serial No.  Lot No. LRB No. Used Action   BONE CEMENT SIMPLEX FULL DOSE 6191-1-001 - [id number] Cement, Bone BONE CEMENT SIMPLEX FULL DOSE 6191-1-001  [employer] [id number] Left 1 Implanted   BONE CEMENT SIMPLEX FULL DOSE 6191-1-001 - [id number] Cement, Bone BONE CEMENT SIMPLEX FULL DOSE 6191-1-001  [employer] [id number] Left 1 Implanted   IMP COMP FEMORAL S&N JOSEMANUEL II NP CR SZ 7 LT [id number] - [id number] Total Joint Component/Insert IMP COMP FEMORAL S&N JOSEMANUEL II NP CR SZ 7 LT [id number]  [employer] [id number] Left 1 Implanted   IMP BASEPLATE TIBIAL JOSEMANUEL II SZ 7 LT TI [id number] - [id number] Total Joint Component/Insert IMP BASEPLATE TIBIAL JOSEMANUEL II SZ 7 LT TI [id number]  [employer] [id number] Left 1 Implanted   IMP INSERT ARTICULAR S&N LGN CR XLPE SZ7-8 9MM [id number] - [id number] Total Joint Component/Insert IMP INSERT ARTICULAR S&N LGN CR XLPE SZ7-8 9MM [id number]  [employer] [id number] Left 1 Implanted       ANESTHETIC: General     OPERATIVE FINDINGS:  End stage arthrosis of the knee. Patella appropriate for patellar retention.     BLOOD LOSS: about 25 ml     TOURNIQUET TIME: 45 min at 250 mm Hg    COMPLICATIONS:  None apparent    OPERATIVE INDICATIONS:  The patient has a long history of debilitating pain secondary to ostearthritis of the knee.   Despite comprehensive non-operative management these symptoms continued to interfere with activities of daily living.  After discussion of further treatment options including the risks and benefits that patient elected to proceed with a total knee.    DESCRIPTION OF THE PROCEDURE:  The patient was identified in the preoperative holding area.  The consent form including the risks and benefits were reviewed with the patient.  The operative limb was identified and marked.  The patient was brought back to the operating room and placed supine on the operating table.  An anesthetic was induced by the anesthesia team.   The patient was prepped and draped in the normal standard fashion for a knee replacement.  A time-out was called.  Antibiotics were given.The tourniquet was inflated.  We utilized an approximately 15 cm curvilinear incision, centered on the patella ridge, and performed a standard parapatellar approach to the knee. There was normal appearing joint fluid seen upon arthrotomy. A modest medial release was performed. The patella was everted. Medial and lateral retractors were placed. The knee was brought into flexion. The AP axis of the knee was marked and an intra-medullary femoral guide deanna was placed as templated. The epicondylar axis was then marked. The anterior femoral cutting guide was placed and the epicondylar and AP axes were used to set the rotation of the jig. A stylus was then used to set the depth of the resection. Retractors were used to protect the skin and the anterior cut was made. The distal femoral cutting guide was then placed and pinned. The resection was set to remove the cartilage from the intra-condylar notch. The femoral sizing guide was then placed; the knee sized well to a 7. The four-in-one femoral cutting guide was then placed and pinned. The cuts were made. The trial component was well fit. Attention was then turned to the tibia. A PCL retractor was then placed and used to bring  the tibia anterior. Medial and lateral retractors were placed. A tibial intra-medullary guide deanna was placed. The tibial cutting guide was then assembled on the guide deanna. The slope was set to nearly mirror the anatomic slope. The tibial cut was first checked with a two-and-nine guide and the cut was then made. A lamina  was then used to remove the necessary bone from the posterior condyles and then the remaining meniscus was removed. The tibia and was sized and it sized well to a 7. The trial components were then assembled marcie  9 mm trial poly was used for trialing. The knee came out into full extension and the knee had greater than 120 degrees of flexion. It was well balanced in the coronal plane with varus and valgus stress at 0 and 30 degrees of flexion. Happy with the reconstruction the tibial rotation was marked, the trial components were removed, and all cancellous surfaces were cleaned with a pulse lavage and then dried. The components were cemented into place, a trial poly placed, the knee brought into extension, and the cement was allowed to dry. With the cement dry the knee was lavaged. The tourniquet was let down and hemostasis was achieved. The knee was then brought into extension and the final poly was placed. It was seen to lock into place. The soft tissues were then injected with analgesic. The capsule was closed with interrupted Vicryl, the dermis with interrupted Vicryl, and skin with running monocryl, Dermabond and steri-strips.  At the end of the procedure the sponge and needle counts were correct times two.  The patient tolerated the procedure well and returned to the PAR extubated and stable.    POSTOPERATIVE PLAN:  1. Weight bearing as tolerated  2. DVT prophylaxis with ECASA  4. 24 hours of prophylactic antibiotics  5. Follow-up:  Wound clinic in 2 weeks and with James in clinic in 6 weeks for x-rays and a rehabilitation check.

## 2024-10-24 NOTE — PROGRESS NOTES
-- DO NOT REPLY / DO NOT REPLY ALL --  -- Message is from the [facility]--    Patient is requesting a medication refill - medication is on active medication list    Patient is currently OUT of the requested medication.    Was Medication Pended?  Yes.    Rx Name and Dose:  ALPRAZolam (XANAX) 0.5 MG tablet    Duration: 30 days    Pharmacy  [id number] [address] [facility] [address]    Patient confirmed the above pharmacy as correct?  Yes    Caller Information       Type Contact Phone    04/29/2021 11:20 AM CDT Phone (Incoming) Anjali Vidal (Self) [phone] (M)          Alternative phone number:     Turnaround time given to caller:   \"This message will be sent to [state Provider's name]. The clinical team will fulfill your request as soon as they review your message.\"   EMR reviewed.   No new medical concern reported.  Patient appears medically stable on chart review.  Patient not seen by me today.  Discharge medication reconciled  Please page with any question.    Lisandro Santoro MD  [facility]  Contact information available via [facility] Paging/Directory

## 2024-10-24 NOTE — PLAN OF CARE
"Goal Outcome Evaluation:      Plan of Care Reviewed With: patient    Overall Patient Progress: improvingOverall Patient Progress: improving       VS: /75 (BP Location: Right arm)   Pulse 76   Temp 98.6  F (37  C) (Oral)   Resp 17   Ht 1.88 m (6' 2\")   Wt 88.2 kg (194 lb 7.1 oz)   SpO2 96%   BMI 24.97 kg/m       Output/Last BM: Continent of B&B, Last BM 10/22    Activity: Ax1 w/ walker    Skin/Dressing: L knee dressinf CDI   Pain: Managed w/ PRN oxy 5mg q4    CMS: A&Ox4    Diet: Regular    LDA: LPIV SL    Equipment: IV pole, personal belongings    Plan: Discharge home    Additional Info: Pt on [place]        RN notified bladder scan was <1000 at 1900. RN attempted to straight cath was unsuccessful. 2nd RN attempted and was unsuccessful and got bright red blood in cath. RN flyer attempted and was successful drained 1250ml of urine. MD notified     0400 pt was able to void bladder scan was 450ml post void. Pt attempted to void again voided 375ml bladder scan was 230ml post void.             "

## 2024-10-24 NOTE — PLAN OF CARE
Occupational Therapy: Orders received. Chart reviewed and discussed with care team.? Occupational Therapy not indicated as the patient has no ADL concerns at this time per PT. Will have assist as needed at discharge.? Defer discharge recommendations to Ortho team.? Will complete orders.

## 2024-10-28 DIAGNOSIS — Z96.652 STATUS POST TOTAL LEFT KNEE REPLACEMENT: Primary | ICD-10-CM

## 2024-10-28 NOTE — TELEPHONE ENCOUNTER
Other: Requesting c/b- hasn't had a bowel movement in 6 days     Could we send this information to you in [employer] or would you prefer to receive a phone call?:   Patient would prefer a phone call   Okay to leave a detailed message?: No at Cell number on file:    Telephone Information:   Mobile [phone]

## 2024-10-28 NOTE — DISCHARGE SUMMARY
ORTHOPEDIC SURGERY DISCHARGE SUMMARY     Date of Admission: 10/23/2024  Date of Discharge: 10/24/2024 10:59 AM  Disposition: Home  Staff Physician: Dr. Ramirez  Primary Care Provider: Virgil  MIRANDA Fournier [facility]    DISCHARGE DIAGNOSIS:  Primary osteoarthritis of left knee [M17.12]    PROCEDURES: Procedure(s):  Left total knee arthroplasty on 10/23/2024    BRIEF HISTORY:  This is a 64 year old patient who has been followed in clinic. Please refer to that documentation for full details. Briefly, the patient has symptomatic left knee OA. The patient has failed conservative treatment of symptoms and desires more definitive intervention. Therefore, after reviewing non-operative and operative options including the risks and benefits associated with each, the patient elected to proceed with the above stated procedure.     HOSPITAL COURSE:    The patient was admitted following the above listed procedures for pain control and rehabilitation. Freeman Velazquez did well post-operatively. Medicine was consulted post operatively to aid in management of medical co-morbidities. The patient received routine nursing cares and at the time of discharge was medically stable. Vital signs were stable throughout admission. The patient is tolerating a regular diet and is voiding spontaneously. All PT/OT goals have been met for safe mobility. Pain is now controlled on oral medications which will be available on discharge. Stool softeners have been used while taking pain medications to help prevent constipation. Freeman Velazquez is deemed medically safe to discharge.     Antibiotics:  Ancef given periop and 24 hours postop.  DVT prophylaxis:  ASA 162mg initiated after surgery and will be continued for 4 weeks.  PT Progress:  Has met PT/OT goals for safe mobility.   Pain Meds:  Weaned off all IV pain meds by discharge.  Inpatient Events:  No significant postoperative events or complications.     PHYSICAL EXAM:    See progress note from  day of discharge.    FOLLOWUP:    Follow up with Dr. Ramirez at 2 weeks postoperatively.    Future Appointments   Date Time Provider Department Center   10/29/2024 11:20 AM Sonu Shane PT [facility]   11/1/2024  1:20 PM MeilingerLuis Fernando [facility]   11/5/2024 11:20 AM Meilinger, Luis Fernando [facility]   11/5/2024  3:20 PM Maryjane Castelan PA-C [facility]   11/7/2024  1:10 PM MeilingerLuis Fernando [facility]   11/12/2024 11:20 AM Meilinger, Luis Fernando [facility]   11/14/2024  1:10 PM Meilinger, Luis Fernando [facility]   11/19/2024 11:20 AM Meilinger, Luis Fernando [facility]   11/21/2024 11:20 AM Meilinger, Luis Fernando [facility]   11/26/2024 11:20 AM Meilinger, Luis [facility]   12/2/2024 11:20 AM Meilinger, Luis Fernando [facility]   12/3/2024  9:20 AM David Ramirez MD [facility]   12/5/2024 11:20 AM MeilingerLuis Fernando [facility]   3/13/2025  9:15 AM Nora Myers PA-C [facility]       Orthopedic Surgery appointments are at the [facility] ([address] [place] [address]). Call [phone] to schedule a follow-up appointment at this location with your provider.     PLANNED DISCHARGE ORDERS:      Discharge Medication List as of 10/24/2024  9:39 AM        START taking these medications    Details   acetaminophen (TYLENOL) 325 MG tablet Take 2 tablets (650 mg) by mouth every 4 hours as needed for other (mild pain)., Disp-100 tablet, R-0, E-Prescribe      aspirin 81 MG EC tablet Take 1 tablet (81 mg) by mouth 2 times daily., Disp-60 tablet, R-0, E-Prescribe      oxyCODONE (ROXICODONE) 5 MG tablet Take 1-2 tablets (5-10 mg) by mouth every 4 hours as needed for moderate to severe pain., Disp-30 tablet, R-0, E-Prescribe      senna-docusate (SENOKOT-S/PERICOLACE) 8.6-50 MG tablet Take 1-2 tablets by mouth 2 times daily as needed for constipation. Take while on oral narcotics to prevent or treat  "constipation., Disp-30 tablet, R-0, E-PrescribeWhile taking narcotics           CONTINUE these medications which have NOT CHANGED    Details   polyethylene glycol (MIRALAX) 17 GM/Dose powder Take 1 Capful by mouth daily as needed for constipation., Historical           STOP taking these medications       ibuprofen (ADVIL/MOTRIN) 200 MG tablet Comments:   Reason for Stopping:         naproxen sodium 220 MG capsule Comments:   Reason for Stopping:                 Discharge Procedure Orders   Reason for your hospital stay   Order Comments: Total knee arthroplasty surgery     When to call - Contact Surgeon Team   Order Comments: You may experience symptoms that require follow-up before your scheduled appointment. Refer to the \"Stoplight Tool\" for instructions on when to contact your Surgeon Team if you are concerned about pain control, blood clots, constipation, or if you are unable to urinate.     When to call - Reach out to Urgent Care   Order Comments: If you are not able to reach your Surgeon Team and you need immediate care, go to the Orthopedic Walk-in Clinic or Urgent Care at your Surgeon's office.  Do NOT go to the Emergency Room unless you have shortness of breath, chest pain, or other signs of a medical emergency.     When to call - Reasons to Call 911   Order Comments: Call 911 immediately if you experience sudden-onset chest pain, arm weakness/numbness, slurred speech, or shortness of breath     Discharge Instruction - Breathing exercises   Order Comments: Perform breathing exercises 10 times per hours while awake for 2 weeks. (If given, use your Incentive Spirometer)     Symptoms - Fever Management   Order Comments: A low grade fever can be expected after surgery.  Use acetaminophen (TYLENOL) as needed for fever management.  Contact your Surgeon Team if you have a fever greater than 101.5 F, chills, and/or night sweats.     Symptoms - Constipation management   Order Comments: Constipation (hard, dry bowel " movements) is expected after surgery due to the combination of being less active, the anesthetic, and the opioid pain medication.  You can do the following to help reduce constipation:  ~  FLUIDS:  Drink clear liquids (water or Gatorade), or juice (apple/prune).  ~  DIET:  Eat a fiber rich diet.    ~  ACTIVITY:  Get up and move around several times a day.  Increase your activity as you are able.  MEDICATIONS:  Reduce the risk of constipation by starting medications before you are constipated.  You can take Miralax   (1 packet as directed) and/or a stool softener (Senokot 1-2 tablets 1-2 times a day).  If you already have constipation and these medications are not working, you can get magnesium citrate and use as directed.  If you continue to have constipation you can try an over the counter suppository or enema.  Call your Surgeon Team if it has been greater than 3 days since your last bowel movement.     Symptoms - Reduced Urine Output   Order Comments: Changes in the amount of fluids you drank before and after surgery may result in problems urinating.  It is important to stay well-hydrated after surgery and drink plenty of water. If it has been greater than 8 hours since you have urinated despite drinking plenty of water, call your Surgeon Team.     Activity - Exercises to prevent blood clots   Order Comments: Unless otherwise directed by your Surgeon team, perform the following exercises at least three times per day for the first four weeks after surgery to prevent blood clots in your legs: 1) Point and flex your feet (Ankle Pumps), 2) Move your ankle around in big circles, 3) Wiggle your toes, 4) Walk, even for short distances, several times a day, will help decrease the risk of blood clots.     Order Specific Question Answer Comments   Is discharge order? Yes      Comfort and Pain Management - Pain after Surgery   Order Comments: Pain after surgery is normal and expected.  You will have some amount of pain for  "several weeks after surgery.  Your pain will improve with time.  There are several things you can do to help reduce your pain including: rest, ice, elevation, and using pain medications as needed. Contact your Surgeon Team if you have pain that persists or worsens after surgery despite rest, ice, elevation, and taking your medication(s) as prescribed. Contact your Surgeon Team if you have new numbness, tingling, or weakness in your operative extremity.     Comfort and Pain Management - Swelling after Surgery   Order Comments: Swelling and/or bruising of the surgical extremity is common and may persist for several months after surgery. In addition to frequent icing and elevation, gentle compressive support with an ACE wrap or tubigrip may help with swelling. Apply compression regularly, removing at least twice daily to perform skin checks. Contact your Surgeon Team if your swelling increases and is NOT associated with an increase in your activity level, or if your swelling increases and is associated with redness and pain.     Comfort and Pain Management - LOWER Extremity Elevation   Order Comments: Swelling is expected for several months after surgery. This type of swelling is usually associated with gravity and activity, and can be improved with elevation.   The best way to do this is to get your \"toes above your nose\" by laying down and placing several pillows lengthwise under your calf and heel. When elevating your leg keep your knee completely straight. Perform this elevation as often as possible especially for the first two weeks after surgery.     Comfort and Pain Management - Cold therapy   Order Comments: Ice can be used to control swelling and discomfort after surgery. Place a thin towel over your operative site and apply the ice pack overtop. Leave ice pack in place for 20 minutes, then remove for 20 minutes. Repeat this 20 minutes on/20 minutes off routine as often as tolerated.     Medication " Instructions - Acetaminophen (TYLENOL) Instructions   Order Comments: You were discharged with acetaminophen (TYLENOL) for pain management after surgery. Acetaminophen most effectively manages pain symptoms when it is taken on a schedule without missing doses (every four, six, or eight hours). Your Provider will prescribe a safe daily dose between 3000 - 4000 mg.  Do NOT exceed this daily dose. Most patients use acetaminophen for pain control for the first four weeks after surgery.  You can wean from this medication as your pain decreases.     Medication Instructions - NSAID Instructions   Order Comments: You were discharged with an anti-inflammatory medication for pain management to use in combination with acetaminophen (TYLENOL) and the narcotic pain medication.  Take this medication exactly as directed.  You should only take one anti-inflammatory at a time.  Some common anti-inflammatories include: ibuprofen (ADVIL, MOTRIN), naproxen (ALEVE, NAPROSYN), celecoxib (CELEBREX), meloxicam (MOBIC), ketorolac (TORADOL).  Take this medication with food and water.     Follow Up Care   Order Comments: Follow-up with your Surgeon Team in 2 weeks for wound check.     Activity - Total Knee Arthroplasty     Order Specific Question Answer Comments   Is discharge order? Yes      Return to Driving   Order Comments: Return to driving - Driving is NOT permitted until directed by your provider. Under no circumstance are you permitted to drive while using narcotic pain medications.     Order Specific Question Answer Comments   Is discharge order? Yes      Return to athletic activities   Order Comments: Return to athletic activities- Activities such as swimming, bicycling, jogging, running, and stop-and-go sports should be avoided until permitted by your Provider.     Order Specific Question Answer Comments   Is discharge order? Yes      Return to School / Work   Order Comments: Return to School / Work: You may return to work/school  when directed by your Provider.     Order Specific Question Answer Comments   Is discharge order? Yes      Weight bearing as tolerated   Order Comments: Weight bearing as tolerated on your operative extremity.     Order Specific Question Answer Comments   Is discharge order? Yes      Dressing / Wound Care - Wound   Order Comments: Leave surgical dressing in place until clinic evaluation in 2 weeks for incision check. Dressing is waterproof but can be reinforced as needed with tegaderm. Contact your Surgeon Team if water gets underneath dressing, you have increased redness, warmth around the surgical wound, and/or drainage from the surgical wound.     Dressing Wound Care - Shower with wound/dressing NOT covered   Order Comments: You do not need to cover your dressing or incision in the shower, you may allow water and soap to run over top of the surgical dressing or incision. Okay to reinforce bandage with saran wrap as needed for showering. You may shower 2 days after surgery.  You are strictly prohibited from soaking or submerging the surgical wound underwater.     Dressing / Wound Care - NO Tub Bathing   Order Comments: Tub bathing, swimming, or any other activities that will cause your incision to be submerged in water should be avoided until allowed by your Surgeon.     Medication instructions -  Anticoagulation - aspirin   Order Comments: Take the aspirin as prescribed for a total of four weeks after surgery.  This is given to help minimize your risk of blood clot.     NO Precautions   Order Comments: No precautions directed by your Provider.  You may perform range of motion activities as tolerated.     Order Specific Question Answer Comments   Is discharge order? Yes      Cane DME   Order Comments: DME Documentation: Describe the reason for need to support medical necessity: Impaired gait status post knee surgery. I, the undersigned, certify that the above prescribed supplies are medically necessary for this  patient and is both reasonable and necessary in reference to accepted standards of medical practice in the treatment of this patient's condition and is not prescribed as a convenience.     Order Specific Question Answer Comments   Medical Equipment (DME) Supplier: [employer]    PATIENT INSTRUCTIONS: If you did not receive this ordered item today, please contact [employer] for availability ([place] Locations: [phone], [place]: [phone]).    Cane Type: Single Tip    Reminder: Patient can typically get 1 every 5 years      Walker DME   Order Comments: DME Documentation: Describe the reason for need to support medical necessity: Impaired gait status post knee surgery. I, the undersigned, certify that the above prescribed supplies are medically necessary for this patient and is both reasonable and necessary in reference to accepted standards of medical practice in the treatment of this patient's condition and is not prescribed as a convenience.     Order Specific Question Answer Comments   Medical Equipment (DME) Supplier: [employer]    PATIENT INSTRUCTIONS: If you did not receive this ordered item today, please contact [employer] for availability ([place] Locations: [phone], [place]: [phone]).    Walker Type: Standard (2 Wheel)    Accessories: N/A      Discharge Instruction - Regular Diet Adult   Order Comments: Return to your pre-surgery diet unless instructed otherwise     Order Specific Question Answer Comments   Is discharge order? Yes      Assign Questionnaire Series to Patient       Orthopedic surgery staff for this patient is Dr. Ramirez. Discussed.    --  Jeremy Weinberg MD  Orthopedic Surgery PGY-4

## 2024-10-28 NOTE — TELEPHONE ENCOUNTER
Called and talked with patient, taking Senna and Miralax daily. He did have a bowel movement 30 minutes after his call into the clinic. We discussed to continue with the Senna and Miralax until his bowel movements are more regular and then he can back off of these medications.    He is icing and elevating his leg. He is doing well otherwise. He does need a refill of his Oxycodone and this will be sent to his pharmacy.    He had no other questions.  Sana Ewing RN

## 2024-10-29 ENCOUNTER — THERAPY VISIT (OUTPATIENT)
Dept: PHYSICAL THERAPY | Facility: CLINIC | Age: 64
End: 2024-10-29
Payer: MEDICARE

## 2024-10-29 DIAGNOSIS — Z96.652 STATUS POST TOTAL LEFT KNEE REPLACEMENT: Primary | ICD-10-CM

## 2024-10-29 PROCEDURE — 97110 THERAPEUTIC EXERCISES: CPT | Mod: GP

## 2024-10-29 PROCEDURE — 97161 PT EVAL LOW COMPLEX 20 MIN: CPT | Mod: GP

## 2024-10-29 RX ORDER — OXYCODONE HYDROCHLORIDE 5 MG/1
5 TABLET ORAL EVERY 4 HOURS PRN
Qty: 20 TABLET | Refills: 0 | Status: SHIPPED | OUTPATIENT
Start: 2024-10-29 | End: 2024-11-01

## 2024-10-29 RX ORDER — ACETAMINOPHEN 325 MG/1
650 TABLET ORAL EVERY 4 HOURS PRN
Qty: 100 TABLET | Refills: 0 | Status: SHIPPED | OUTPATIENT
Start: 2024-10-29 | End: 2024-11-05

## 2024-10-29 ASSESSMENT — ACTIVITIES OF DAILY LIVING (ADL)
RAW_SCORE: 24
AS_A_RESULT_OF_YOUR_KNEE_INJURY,_HOW_WOULD_YOU_RATE_YOUR_CURRENT_LEVEL_OF_DAILY_ACTIVITY?: SEVERELY ABNORMAL
SIT WITH YOUR KNEE BENT: ACTIVITY IS FAIRLY DIFFICULT
STIFFNESS: THE SYMPTOM AFFECTS MY ACTIVITY SEVERELY
KNEE_ACTIVITY_OF_DAILY_LIVING_SCORE: 34.29
WEAKNESS: THE SYMPTOM AFFECTS MY ACTIVITY MODERATELY
GIVING WAY, BUCKLING OR SHIFTING OF KNEE: THE SYMPTOM AFFECTS MY ACTIVITY SLIGHTLY
HOW_WOULD_YOU_RATE_THE_CURRENT_FUNCTION_OF_YOUR_KNEE_DURING_YOUR_USUAL_DAILY_ACTIVITIES_ON_A_SCALE_FROM_0_TO_100_WITH_100_BEING_YOUR_LEVEL_OF_KNEE_FUNCTION_PRIOR_TO_YOUR_INJURY_AND_0_BEING_THE_INABILITY_TO_PERFORM_ANY_OF_YOUR_USUAL_DAILY_ACTIVITIES?: 10
WALK: ACTIVITY IS FAIRLY DIFFICULT
STIFFNESS: THE SYMPTOM AFFECTS MY ACTIVITY SEVERELY
SIT WITH YOUR KNEE BENT: ACTIVITY IS FAIRLY DIFFICULT
PLEASE_INDICATE_YOR_PRIMARY_REASON_FOR_REFERRAL_TO_THERAPY:: KNEE
GO DOWN STAIRS: ACTIVITY IS VERY DIFFICULT
KNEE_ACTIVITY_OF_DAILY_LIVING_SUM: 24
RISE FROM A CHAIR: ACTIVITY IS SOMEWHAT DIFFICULT
KNEEL ON THE FRONT OF YOUR KNEE: I AM UNABLE TO DO THE ACTIVITY
SWELLING: THE SYMPTOM AFFECTS MY ACTIVITY MODERATELY
HOW_WOULD_YOU_RATE_THE_OVERALL_FUNCTION_OF_YOUR_KNEE_DURING_YOUR_USUAL_DAILY_ACTIVITIES?: SEVERELY ABNORMAL
SQUAT: I AM UNABLE TO DO THE ACTIVITY
STAND: ACTIVITY IS MINIMALLY DIFFICULT
GO UP STAIRS: ACTIVITY IS VERY DIFFICULT
LIMPING: THE SYMPTOM AFFECTS MY ACTIVITY MODERATELY
HOW_WOULD_YOU_RATE_THE_OVERALL_FUNCTION_OF_YOUR_KNEE_DURING_YOUR_USUAL_DAILY_ACTIVITIES?: SEVERELY ABNORMAL
WALK: ACTIVITY IS FAIRLY DIFFICULT
HOW_WOULD_YOU_RATE_THE_CURRENT_FUNCTION_OF_YOUR_KNEE_DURING_YOUR_USUAL_DAILY_ACTIVITIES_ON_A_SCALE_FROM_0_TO_100_WITH_100_BEING_YOUR_LEVEL_OF_KNEE_FUNCTION_PRIOR_TO_YOUR_INJURY_AND_0_BEING_THE_INABILITY_TO_PERFORM_ANY_OF_YOUR_USUAL_DAILY_ACTIVITIES?: 10
PAIN: THE SYMPTOM AFFECTS MY ACTIVITY SEVERELY
GIVING WAY, BUCKLING OR SHIFTING OF KNEE: THE SYMPTOM AFFECTS MY ACTIVITY SLIGHTLY
STAND: ACTIVITY IS MINIMALLY DIFFICULT
GO UP STAIRS: ACTIVITY IS VERY DIFFICULT
SWELLING: THE SYMPTOM AFFECTS MY ACTIVITY MODERATELY
LIMPING: THE SYMPTOM AFFECTS MY ACTIVITY MODERATELY
PAIN: THE SYMPTOM AFFECTS MY ACTIVITY SEVERELY
WEAKNESS: THE SYMPTOM AFFECTS MY ACTIVITY MODERATELY
KNEEL ON THE FRONT OF YOUR KNEE: I AM UNABLE TO DO THE ACTIVITY
GO DOWN STAIRS: ACTIVITY IS VERY DIFFICULT
AS_A_RESULT_OF_YOUR_KNEE_INJURY,_HOW_WOULD_YOU_RATE_YOUR_CURRENT_LEVEL_OF_DAILY_ACTIVITY?: SEVERELY ABNORMAL
RISE FROM A CHAIR: ACTIVITY IS SOMEWHAT DIFFICULT
SQUAT: I AM UNABLE TO DO THE ACTIVITY

## 2024-10-29 NOTE — PROGRESS NOTES
PHYSICAL THERAPY EVALUATION  Type of Visit: Evaluation       Fall Risk Screen:  Fall screen completed by: PT  Have you fallen 2 or more times in the past year?: No  Have you fallen and had an injury in the past year?: No  Is patient a fall risk?: No    Subjective         Presenting condition or subjective complaint: (Patient-Rptd) post-op knee replacement recovery  Date of onset: 10/23/24 (DOS)    Relevant medical history:     Dates & types of surgery: (Patient-Rptd) 10/23/24 total left knee replacement    65 y/o male presents to physical therapy with chief complaint of left knee pain  s/p L TKA on 10/23/24. Things have been okay since surgery. Waking up every 2 hours for meds. Does notice he's having some increased integ symptoms with medications. Has had an inconsistent fever between 99 and 100.2 deg, it goes up and down over the course of the week. Started a couple days after the surgery. Some increased radicular symptoms in R foot, they were present prior to surgery but they have slightly worsened. Is also having radicular some symptoms in L foot as well.     Prior diagnostic imaging/testing results: (Patient-Rptd) MRI; CT scan; X-ray     Prior therapy history for the same diagnosis, illness or injury: (Patient-Rptd) Yes (Patient-Rptd) about 1.5 years ago had a series of PT appts for the knee    Prior Level of Function  Transfers:   Ambulation:   ADL:   IADL:     Living Environment  Social support: (Patient-Rptd) With a significant other or spouse   Type of home: (Patient-Rptd) House; 1 level; Basement   Stairs to enter the home: (Patient-Rptd) Yes       Ramp: (Patient-Rptd) No   Stairs inside the home: (Patient-Rptd) Yes (Patient-Rptd) 2 Is there a railing: (Patient-Rptd) No     Help at home: (Patient-Rptd) Self Cares (home health aide/personal care attendant, family, etc); Home management tasks (cooking, cleaning); Home and Yard maintenance tasks  Equipment owned: (Patient-Rptd) Walker; Crutches; Standard  wheelchair     Employment:      Hobbies/Interests:      Patient goals for therapy: (Patient-Rptd) all activities previously engaged in    Pain assessment:      Objective   KNEE EVALUATION  PAIN: Pain Level at Rest: 3/10  Pain Level with Use: 5/10  Pain Location: hip and knee  Pain Quality: Aching  Pain Frequency: constant  Pain is Exacerbated By: walking, swinging leg into/out of things without strap,   Pain is Relieved By: cold, NSAIDs, otc medications, and rest  Pain Progression: Improved  INTEGUMENTARY (edema, incisions):   POSTURE:   GAIT:  Weightbearing Status: WBAT  Assistive Device(s): Walker (front wheeled)  Gait Deviations:  antalgic gait with reduced stance time on L LE, reduced step/stride length, reduced heel strike, knee flexed gait   BALANCE/PROPRIOCEPTION:   WEIGHTBEARING ALIGNMENT:   NON-WEIGHTBEARING ALIGNMENT:   ROM:  8-50 deg AROM of L knee     STRENGTH:  mild-moderate quad function  FLEXIBILITY:   SPECIAL TESTS:  janeen's (-)  FUNCTIONAL TESTS:   PALPATION:   JOINT MOBILITY:     Assessment & Plan   CLINICAL IMPRESSIONS  Medical Diagnosis: Status post total left knee replacement    Treatment Diagnosis: Status post total left knee replacement   Impression/Assessment: Patient is a 64 year old male with left knee complaints.  The following significant findings have been identified: Pain, Decreased ROM/flexibility, Decreased joint mobility, Decreased strength, Decreased proprioception, Inflammation, Edema, Impaired gait, Impaired muscle performance, and Decreased activity tolerance. These impairments interfere with their ability to perform self care tasks, work tasks, recreational activities, household chores, driving , household mobility, and community mobility as compared to previous level of function.     Clinical Decision Making (Complexity):  Clinical Presentation: Stable/Uncomplicated  Clinical Presentation Rationale: based on medical and personal factors listed in PT evaluation  Clinical  Decision Making (Complexity): Low complexity    PLAN OF CARE  Treatment Interventions:  Modalities: Cryotherapy, E-stim, Hot Pack  Interventions: Gait Training, Manual Therapy, Neuromuscular Re-education, Therapeutic Activity, Therapeutic Exercise    Long Term Goals     PT Goal 1  Goal Identifier: LTG 1  Goal Description: Pt to demonstrate normal reciprocal gait krunal with minimal to no deviations and <2/10 pain  Rationale: to maximize safety and independence with performance of ADLs and functional tasks;to maximize safety and independence within the home;to maximize safety and independence within the community;to maximize safety and independence with transportation;to maximize safety and independence with self cares  Target Date: 01/21/25      Frequency of Treatment: 1x per week  Duration of Treatment: 2x/week for first 4-6 weeks, transition to 1x/week for remaining weeks    Recommended Referrals to Other Professionals:   Education Assessment:   Learner/Method: Patient;No Barriers to Learning  Education Comments: Educated on goals of PT, expected response to HEP, red flag signs/symptoms of infection and DVT    Risks and benefits of evaluation/treatment have been explained.   Patient/Family/caregiver agrees with Plan of Care.     Evaluation Time:     PT Eval, Low Complexity Minutes (27286): 25       Signing Clinician: Sonu Shane, PT        [facility]                                                                                   OUTPATIENT PHYSICAL THERAPY      PLAN OF TREATMENT FOR OUTPATIENT REHABILITATION   Patient's Last Name, First Name, Freeman Major YOB: 1960   Provider's Name   [facility]   Medical Record No.  [id number]     Onset Date: 10/23/24 (DOS)  Start of Care Date: 10/29/24     Medical Diagnosis:  Status post total left knee replacement      PT Treatment Diagnosis:  Status post total left knee replacement Plan of  Treatment  Frequency/Duration: 1x per week/ 2x/week for first 4-6 weeks, transition to 1x/week for remaining weeks    Certification date from 10/29/24 to 01/21/25         See note for plan of treatment details and functional goals     Sonu Shane, PT                         I CERTIFY THE NEED FOR THESE SERVICES FURNISHED UNDER        THIS PLAN OF TREATMENT AND WHILE UNDER MY CARE     (Physician attestation of this document indicates review and certification of the therapy plan).              Referring Provider:  David Ramirez    Initial Assessment  See Epic Evaluation- Start of Care Date: 10/29/24

## 2024-10-31 ENCOUNTER — TELEPHONE (OUTPATIENT)
Dept: OTHER | Facility: CLINIC | Age: 64
End: 2024-10-31
Payer: MEDICARE

## 2024-10-31 NOTE — TELEPHONE ENCOUNTER
Brief Telephone Encounter Note    Received a phone call from Freeman Velazquez. Patient is a patient of Dr. Ramirez's's. Last had a left total knee arthroplasty on 10/23/2024.  Patient is concerned that he may have a blood clot in his left calf.     Patient had his first physical therapy session yesterday and is doing well from a range of motion perspective but since yesterday, has had left calf tightness and tenderness to palpation.  He denies associated erythema.  He has no chest pain, or SOB. Does not sound significantly weak.  He has been taking his aspirin 81 mg twice daily as directed.  He does report a single associated low-grade fever to 100.5 but has mostly been in the 97.8-100.5 Fahrenheit range.    Patient has no drainage from incision. No red areas or streakiness.    Explained my role as the overnight triage resident for emergencies. I explained that it is very difficult to determine over the phone whether someone has a blood clot.      Given his concern for a blood clot and the time of day, I discussed that my recommendation was for him to present to the ED for possible evaluation of a blood clot in his left calf.  Regardless of the outcome, I recommended that he call Dr. Ramirez's office tomorrow to report these concerns.    Additionally, he reports that his back has somewhat broken out with what he thinks are small pimples versus what sounds like a possible drug reaction.  He thinks this might be caused by the oxycodone. I discussed our policiy that I am unable to provide or alter narcotic scripts. I encouraged  to call the office in the morning to discuss this concern.     Freeman Velazquez was agreeable to this plan.  He indicated that he would present to the [facility] ED for evaluation of a possible postoperative blood clot.    All questions answered.    --  Lowell Falcon MD, PhD  Orthopaedic Surgery Resident  [facility]  10/31/24

## 2024-11-01 ENCOUNTER — THERAPY VISIT (OUTPATIENT)
Dept: PHYSICAL THERAPY | Facility: CLINIC | Age: 64
End: 2024-11-01
Attending: ORTHOPAEDIC SURGERY
Payer: MEDICARE

## 2024-11-01 DIAGNOSIS — Z96.652 STATUS POST TOTAL LEFT KNEE REPLACEMENT: Primary | ICD-10-CM

## 2024-11-01 PROCEDURE — 97110 THERAPEUTIC EXERCISES: CPT | Mod: GP

## 2024-11-01 RX ORDER — HYDROCODONE BITARTRATE AND ACETAMINOPHEN 5; 325 MG/1; MG/1
1 TABLET ORAL EVERY 4 HOURS PRN
Qty: 30 TABLET | Refills: 0 | Status: SHIPPED | OUTPATIENT
Start: 2024-11-01 | End: 2024-11-05

## 2024-11-01 RX ORDER — HYDROMORPHONE HYDROCHLORIDE 2 MG/1
2 TABLET ORAL EVERY 4 HOURS PRN
Qty: 30 TABLET | Refills: 0 | Status: SHIPPED | OUTPATIENT
Start: 2024-11-01 | End: 2024-11-06

## 2024-11-01 NOTE — TELEPHONE ENCOUNTER
Patient called back requesting to speak to VIDHI Hood again , Per patient : I know I just talked to Sana but something came up I need to talk to her urgently please.

## 2024-11-01 NOTE — TELEPHONE ENCOUNTER
"Phoned patient back regarding Dilaudid prescription.    Patient s/p left total knee arthroplasty DOS 10/23/24 with Dr. Ramirez.    Patient's wife remembered while he was in recovery that a nurse came and spoke with her.  She mentioned there was an instance where Freeman stopped breathing during recovery in the hospital and attributed to Dilaudid.    Spoke with ELSA Quijano - \"to be on the safe side, let's switch to Norco\".    Discussed with patient the respiratory depression is a side effect of all narcotics and in combination with anesthesia this could have caused this.  However, to be on the safe side, we will send [place] to his pharmacy instead.  Educated patient that Norco has Tylenol in it, and to be mindful the daily allowance of Norco is 3,000-4,000mg per day for most people.  Advised it is never OK to combine opioids, Oxycodone and Norco should never be taken together.  We will be calling his pharmacy to cancel the Dilaudid prescription.  Good understanding expressed.    Medication routed to provider for signature.    No Hanson RN on 11/1/2024 at 5:24 PM    "

## 2024-11-01 NOTE — TELEPHONE ENCOUNTER
Other: Freeman called he would like someone to give him a call concerning some issues with a medication he is taking after surgery and also pain that feels like a pulled muscle but possible concern that it may be a blood clot. Please call to discuss    Could we [facility] this information to you [facility] or would you prefer to receive a phone call?:   Patient would prefer a phone call   Okay to leave a detailed message?: Yes at Cell number on file:    Telephone Information:   Mobile [phone]

## 2024-11-01 NOTE — TELEPHONE ENCOUNTER
Called and talked with patient.  He states that 2 days after taking oxycodone he developed a rash on the back that is very itchy.  This starts at the base of his neck and goes to his low back.  It is only on his back not his arms or chest or down his legs.  He has not had a rash like this before that he remembers.  The rash and itchiness has not gotten worse since this time he has been scratching it though and does have a few scabbed over areas.  He was wondering if he was having a reaction to the oxycodone and if he needed to stop it.  Asked him to send in a picture via [employer] for us to assess.  He will work on doing this  Currently he is taking oxycodone 1 tablet every 6 hours alternating with Tylenol every 6 hours.  Will wait for pictures to come through and discuss with provider.    Patient had a concern that he had developed a DVT.  He was seen in physical therapy on Tuesday and was checked for DVT at that time by physical therapy with movement testing and tested negative.  Since that physical therapy session he has had what feels like a pulled muscle it is stiff and painful when he is standing or laying down.  There is no redness swelling hardness in the area of his calf.  He did have PT again today and they examined his leg and again did motion testing and these both came out negative as well.  Discussed with patient the signs and symptoms of DVT and he does not believe anything is present except pain in his calf but again feels like this is more of a pulled muscle type pain.  He will continue to monitor and let us know if he develops any blood clot symptoms.    Constipation.  Patient has only had 1 bowel movement since surgery he has been taking the senna and the MiraLAX.  He is wondering if he should take anything more for constipation.  Patient needed to move out of the car and go lay down before this could be addressed.  We will address again when we call him back after the pictures are received.    He  states that he developed a low-grade temp 2 days after surgery and this has continued for him nightly with temperatures between 99.7-100.5.  These are only happening at nighttime and not during the day.  He does not get chills with these temperature increases and he is not taking anything during the day for fever management.  His incision has no drainage on it he states there is no redness around it no increase in swelling and no significant pain with walking.  Low concern for infection but also asked him to send in a picture of his incision    Will discuss all of these concerns with the provider and wait for the MyChart pictures to come through and call patient back.

## 2024-11-01 NOTE — TELEPHONE ENCOUNTER
Called and talked with patient, we discussed changing medication to Dilaudid to see if there has been a reaction to his medication. He will try this and let us know how he is doing.  We discussed his constipation and he will try and enema over the weekend.  As recommended by PA, patient should have an rule out Ultrasound for DVT. Advised him to go the UC or ER to have his leg evaluated. He will try and go to one of these, he said if he has any issues he will let us know Monday.    No other questions. He will still send pictures via [employer].   Sana Ewing RN

## 2024-11-05 ENCOUNTER — OFFICE VISIT (OUTPATIENT)
Dept: ORTHOPEDICS | Facility: CLINIC | Age: 64
End: 2024-11-05
Payer: MEDICARE

## 2024-11-05 ENCOUNTER — THERAPY VISIT (OUTPATIENT)
Dept: PHYSICAL THERAPY | Facility: CLINIC | Age: 64
End: 2024-11-05
Payer: MEDICARE

## 2024-11-05 ENCOUNTER — TELEPHONE (OUTPATIENT)
Dept: UROLOGY | Facility: CLINIC | Age: 64
End: 2024-11-05

## 2024-11-05 DIAGNOSIS — Z96.652 STATUS POST TOTAL LEFT KNEE REPLACEMENT: ICD-10-CM

## 2024-11-05 DIAGNOSIS — Z96.652 STATUS POST TOTAL LEFT KNEE REPLACEMENT: Primary | ICD-10-CM

## 2024-11-05 PROCEDURE — 97530 THERAPEUTIC ACTIVITIES: CPT | Mod: GP

## 2024-11-05 PROCEDURE — 97110 THERAPEUTIC EXERCISES: CPT | Mod: GP

## 2024-11-05 PROCEDURE — 99024 POSTOP FOLLOW-UP VISIT: CPT

## 2024-11-05 RX ORDER — HYDROCODONE BITARTRATE AND ACETAMINOPHEN 5; 325 MG/1; MG/1
1 TABLET ORAL EVERY 4 HOURS PRN
Qty: 30 TABLET | Refills: 0 | Status: SHIPPED | OUTPATIENT
Start: 2024-11-05

## 2024-11-05 RX ORDER — ACETAMINOPHEN 325 MG/1
325 TABLET ORAL EVERY 4 HOURS PRN
Qty: 50 TABLET | Refills: 0 | Status: SHIPPED | OUTPATIENT
Start: 2024-11-05

## 2024-11-05 NOTE — LETTER
11/5/2024      Freeman Velazquez  [address]  [facility] [address]      Dear Colleague,    Thank you for referring your patient, Freeman Velazquez, to the [facility]. Please see a copy of my visit note below.    Chief Complaint:   Follow up, DOS 10/23/24 with Dr. Ramirez    Procedures:  Left total knee arthoplasty     History:  Freeman Velazquez is a 64 year old patient s/p above procedure, here for follow up. He has done well since surgery. He did call our clinic a new itchy rash on his back, presumed to be from Oxycodone. Was switched to Norco, rash is resolving. He also initially had concern about a DVT due to feeling of tightness behind his knee. Our team offered a US which he did not pursue, denies calf pain, redness, or swelling today. Pain is controlled with Norco 1-2 times daily. Taking Asa for DVT prophylaxis as prescribed. Working with physical therapy and diligently working on home exercise program. Ambulating with use of a walker. His main concern is ongoing constipation, he will try another enema and/or milk of magnesia tonight. No specific questions or concerns today.       Exam:     General: Awake, Alert, and oriented. Articulates and communicates with a normal affect     Left Lower Extremity:  Incisions well healed without evidence of infection, no erythema, drainage, or wound dehiscence   Normal post-operative effusion and ecchymosis  Range of motion 4-60  TA/Gsc/EHL/FHL with 5/5 strength  Distal pulses palpable, toes are warm and well perfused   Gait: Antalgic with use of a walker     Imaging:  No new imaging.     Medications:     Current Outpatient Medications:      acetaminophen (TYLENOL) 325 MG tablet, Take 2 tablets (650 mg) by mouth every 4 hours as needed for mild pain., Disp: 100 tablet, Rfl: 0     acetaminophen (TYLENOL) 325 MG tablet, Take 2 tablets (650 mg) by mouth every 4 hours as needed for other (mild pain)., Disp: 100 tablet, Rfl: 0      aspirin 81 MG EC tablet, Take 1 tablet (81 mg) by mouth 2 times daily., Disp: 60 tablet, Rfl: 0     HYDROcodone-acetaminophen (NORCO) 5-325 MG tablet, Take 1 tablet by mouth every 4 hours as needed for pain., Disp: 30 tablet, Rfl: 0     polyethylene glycol (MIRALAX) 17 GM/Dose powder, Take 1 Capful by mouth daily as needed for constipation., Disp: , Rfl:      senna-docusate (SENOKOT-S/PERICOLACE) 8.6-50 MG tablet, Take 1-2 tablets by mouth 2 times daily as needed for constipation. Take while on oral narcotics to prevent or treat constipation., Disp: 30 tablet, Rfl: 0     HYDROmorphone (DILAUDID) 2 MG tablet, Take 1 tablet (2 mg) by mouth every 4 hours as needed for pain. (Patient not taking: Reported on 11/5/2024), Disp: 30 tablet, Rfl: 0     oxyCODONE (ROXICODONE) 5 MG tablet, Take 1-2 tablets (5-10 mg) by mouth every 4 hours as needed for moderate to severe pain. (Patient not taking: Reported on 11/5/2024), Disp: 30 tablet, Rfl: 0    Assessment:  Doing well 2 weeks s/p L TKA with Dr. Ramirez. Basic wound cares were performed today, I did not appreciate signs of infection. We discussed the plan below, all questions were answered to the best of my ability.     Plan:   -Weight bearing: WBAT   -Range of motion as tolerated   -Follow with physical therapy focusing on gait and stability  -DVT prophylaxis:  mg daily x 4 weeks  -Follow up: 6 weeks with Dr. Ramirez, new AP/lateral XR    Yuval Castelan PA-C 11/5/2024 3:27 PM  Orthopedic Surgery          Again, thank you for allowing me to participate in the care of your patient.        Sincerely,        YUVAL LUKE PA-C

## 2024-11-05 NOTE — PROGRESS NOTES
Chief Complaint:   Follow up, DOS 10/23/24 with Dr. Ramirez    Procedures:  Left total knee arthoplasty     History:  Freeman Velazquez is a 64 year old patient s/p above procedure, here for follow up. He has done well since surgery. He did call our clinic a new itchy rash on his back, presumed to be from Oxycodone. Was switched to Norco, rash is resolving. He also initially had concern about a DVT due to feeling of tightness behind his knee. Our team offered a US which he did not pursue, denies calf pain, redness, or swelling today. Pain is controlled with Norco 1-2 times daily. Taking Asa for DVT prophylaxis as prescribed. Working with physical therapy and diligently working on home exercise program. Ambulating with use of a walker. His main concern is ongoing constipation, he will try another enema and/or milk of magnesia tonight. No specific questions or concerns today.       Exam:     General: Awake, Alert, and oriented. Articulates and communicates with a normal affect     Left Lower Extremity:  Incisions well healed without evidence of infection, no erythema, drainage, or wound dehiscence   Normal post-operative effusion and ecchymosis  Range of motion 4-60  TA/Gsc/EHL/FHL with 5/5 strength  Distal pulses palpable, toes are warm and well perfused   Gait: Antalgic with use of a walker     Imaging:  No new imaging.     Medications:     Current Outpatient Medications:     acetaminophen (TYLENOL) 325 MG tablet, Take 2 tablets (650 mg) by mouth every 4 hours as needed for mild pain., Disp: 100 tablet, Rfl: 0    acetaminophen (TYLENOL) 325 MG tablet, Take 2 tablets (650 mg) by mouth every 4 hours as needed for other (mild pain)., Disp: 100 tablet, Rfl: 0    aspirin 81 MG EC tablet, Take 1 tablet (81 mg) by mouth 2 times daily., Disp: 60 tablet, Rfl: 0    HYDROcodone-acetaminophen (NORCO) 5-325 MG tablet, Take 1 tablet by mouth every 4 hours as needed for pain., Disp: 30 tablet, Rfl: 0    polyethylene glycol (MIRALAX)  17 GM/Dose powder, Take 1 Capful by mouth daily as needed for constipation., Disp: , Rfl:     senna-docusate (SENOKOT-S/PERICOLACE) 8.6-50 MG tablet, Take 1-2 tablets by mouth 2 times daily as needed for constipation. Take while on oral narcotics to prevent or treat constipation., Disp: 30 tablet, Rfl: 0    HYDROmorphone (DILAUDID) 2 MG tablet, Take 1 tablet (2 mg) by mouth every 4 hours as needed for pain. (Patient not taking: Reported on 11/5/2024), Disp: 30 tablet, Rfl: 0    oxyCODONE (ROXICODONE) 5 MG tablet, Take 1-2 tablets (5-10 mg) by mouth every 4 hours as needed for moderate to severe pain. (Patient not taking: Reported on 11/5/2024), Disp: 30 tablet, Rfl: 0    Assessment:  Doing well 2 weeks s/p L TKA with Dr. Ramirez. Basic wound cares were performed today, I did not appreciate signs of infection. We discussed the plan below, all questions were answered to the best of my ability.     Plan:   -Weight bearing: WBAT   -Range of motion as tolerated   -Follow with physical therapy focusing on gait and stability  -DVT prophylaxis:  mg daily x 4 weeks  -Follow up: 6 weeks with Dr. Ramirez, new AP/lateral XR    Maryjane Castelan PA-C 11/5/2024 3:27 PM  Orthopedic Surgery

## 2024-11-05 NOTE — TELEPHONE ENCOUNTER
Sent order for 24 hour urine kit x 1 to [employer] via [employer]. [employer] message sent to patient with instructions for completion.

## 2024-11-05 NOTE — NURSING NOTE
Reason For Visit:   Chief Complaint   Patient presents with    RECHECK     DOS 10/23/24 Left TKA       There were no vitals taken for this visit.    Pain Assessment  Patient Currently in Pain: Yes  0-10 Pain Scale: 4  Primary Pain Location: Knee    Carol PADMA Renteria

## 2024-11-06 DIAGNOSIS — Z96.652 STATUS POST TOTAL LEFT KNEE REPLACEMENT: Primary | ICD-10-CM

## 2024-11-07 ENCOUNTER — THERAPY VISIT (OUTPATIENT)
Dept: PHYSICAL THERAPY | Facility: CLINIC | Age: 64
End: 2024-11-07
Attending: ORTHOPAEDIC SURGERY
Payer: MEDICARE

## 2024-11-07 DIAGNOSIS — Z96.652 STATUS POST TOTAL LEFT KNEE REPLACEMENT: Primary | ICD-10-CM

## 2024-11-07 PROCEDURE — 97110 THERAPEUTIC EXERCISES: CPT | Mod: GP

## 2024-11-12 ENCOUNTER — THERAPY VISIT (OUTPATIENT)
Dept: PHYSICAL THERAPY | Facility: CLINIC | Age: 64
End: 2024-11-12
Payer: MEDICARE

## 2024-11-12 DIAGNOSIS — Z96.652 STATUS POST TOTAL LEFT KNEE REPLACEMENT: Primary | ICD-10-CM

## 2024-11-12 PROCEDURE — 97530 THERAPEUTIC ACTIVITIES: CPT | Mod: GP

## 2024-11-12 PROCEDURE — 97110 THERAPEUTIC EXERCISES: CPT | Mod: GP

## 2024-11-14 ENCOUNTER — THERAPY VISIT (OUTPATIENT)
Dept: PHYSICAL THERAPY | Facility: CLINIC | Age: 64
End: 2024-11-14
Attending: ORTHOPAEDIC SURGERY
Payer: MEDICARE

## 2024-11-14 DIAGNOSIS — Z96.652 STATUS POST TOTAL LEFT KNEE REPLACEMENT: Primary | ICD-10-CM

## 2024-11-14 PROCEDURE — 97110 THERAPEUTIC EXERCISES: CPT | Mod: GP

## 2024-11-19 ENCOUNTER — THERAPY VISIT (OUTPATIENT)
Dept: PHYSICAL THERAPY | Facility: CLINIC | Age: 64
End: 2024-11-19
Payer: MEDICARE

## 2024-11-19 DIAGNOSIS — Z96.652 STATUS POST TOTAL LEFT KNEE REPLACEMENT: Primary | ICD-10-CM

## 2024-11-19 PROCEDURE — 97110 THERAPEUTIC EXERCISES: CPT | Mod: GP

## 2024-11-21 ENCOUNTER — THERAPY VISIT (OUTPATIENT)
Dept: PHYSICAL THERAPY | Facility: CLINIC | Age: 64
End: 2024-11-21
Payer: MEDICARE

## 2024-11-21 DIAGNOSIS — Z96.652 STATUS POST TOTAL LEFT KNEE REPLACEMENT: Primary | ICD-10-CM

## 2024-11-26 ENCOUNTER — THERAPY VISIT (OUTPATIENT)
Dept: PHYSICAL THERAPY | Facility: CLINIC | Age: 64
End: 2024-11-26
Payer: MEDICARE

## 2024-11-26 DIAGNOSIS — Z96.652 STATUS POST TOTAL LEFT KNEE REPLACEMENT: Primary | ICD-10-CM

## 2024-11-26 PROCEDURE — 97110 THERAPEUTIC EXERCISES: CPT | Mod: GP

## 2024-12-02 ENCOUNTER — THERAPY VISIT (OUTPATIENT)
Dept: PHYSICAL THERAPY | Facility: CLINIC | Age: 64
End: 2024-12-02
Payer: MEDICARE

## 2024-12-02 DIAGNOSIS — Z96.652 STATUS POST TOTAL LEFT KNEE REPLACEMENT: Primary | ICD-10-CM

## 2024-12-02 PROCEDURE — 97110 THERAPEUTIC EXERCISES: CPT | Mod: GP

## 2024-12-03 ENCOUNTER — ANCILLARY PROCEDURE (OUTPATIENT)
Dept: GENERAL RADIOLOGY | Facility: CLINIC | Age: 64
End: 2024-12-03
Attending: ORTHOPAEDIC SURGERY
Payer: MEDICARE

## 2024-12-03 ENCOUNTER — ANCILLARY PROCEDURE (OUTPATIENT)
Dept: GENERAL RADIOLOGY | Facility: CLINIC | Age: 64
End: 2024-12-03
Payer: MEDICARE

## 2024-12-03 ENCOUNTER — OFFICE VISIT (OUTPATIENT)
Dept: FAMILY MEDICINE | Facility: CLINIC | Age: 64
End: 2024-12-03
Payer: MEDICARE

## 2024-12-03 ENCOUNTER — OFFICE VISIT (OUTPATIENT)
Dept: ORTHOPEDICS | Facility: CLINIC | Age: 64
End: 2024-12-03
Payer: MEDICARE

## 2024-12-03 VITALS
SYSTOLIC BLOOD PRESSURE: 120 MMHG | WEIGHT: 191.5 LBS | RESPIRATION RATE: 15 BRPM | DIASTOLIC BLOOD PRESSURE: 79 MMHG | HEIGHT: 74 IN | BODY MASS INDEX: 24.58 KG/M2 | HEART RATE: 80 BPM | OXYGEN SATURATION: 100 % | TEMPERATURE: 97.2 F

## 2024-12-03 DIAGNOSIS — Z96.652 STATUS POST TOTAL LEFT KNEE REPLACEMENT: ICD-10-CM

## 2024-12-03 DIAGNOSIS — K59.03 DRUG-INDUCED CONSTIPATION: ICD-10-CM

## 2024-12-03 DIAGNOSIS — Z47.89 ORTHOPEDIC AFTERCARE: Primary | ICD-10-CM

## 2024-12-03 DIAGNOSIS — K59.03 DRUG-INDUCED CONSTIPATION: Primary | ICD-10-CM

## 2024-12-03 LAB
BASOPHILS # BLD AUTO: 0 10E3/UL (ref 0–0.2)
BASOPHILS NFR BLD AUTO: 1 %
EOSINOPHIL # BLD AUTO: 0.1 10E3/UL (ref 0–0.7)
EOSINOPHIL NFR BLD AUTO: 2 %
ERYTHROCYTE [DISTWIDTH] IN BLOOD BY AUTOMATED COUNT: 14.4 % (ref 10–15)
HCT VFR BLD AUTO: 40.2 % (ref 40–53)
HGB BLD-MCNC: 12.8 G/DL (ref 13.3–17.7)
IMM GRANULOCYTES # BLD: 0 10E3/UL
IMM GRANULOCYTES NFR BLD: 0 %
LYMPHOCYTES # BLD AUTO: 1.3 10E3/UL (ref 0.8–5.3)
LYMPHOCYTES NFR BLD AUTO: 24 %
MCH RBC QN AUTO: 29.2 PG (ref 26.5–33)
MCHC RBC AUTO-ENTMCNC: 31.8 G/DL (ref 31.5–36.5)
MCV RBC AUTO: 92 FL (ref 78–100)
MONOCYTES # BLD AUTO: 0.5 10E3/UL (ref 0–1.3)
MONOCYTES NFR BLD AUTO: 9 %
NEUTROPHILS # BLD AUTO: 3.5 10E3/UL (ref 1.6–8.3)
NEUTROPHILS NFR BLD AUTO: 65 %
PLATELET # BLD AUTO: 357 10E3/UL (ref 150–450)
RBC # BLD AUTO: 4.39 10E6/UL (ref 4.4–5.9)
WBC # BLD AUTO: 5.5 10E3/UL (ref 4–11)

## 2024-12-03 PROCEDURE — 36415 COLL VENOUS BLD VENIPUNCTURE: CPT

## 2024-12-03 PROCEDURE — 80053 COMPREHEN METABOLIC PANEL: CPT

## 2024-12-03 PROCEDURE — 99024 POSTOP FOLLOW-UP VISIT: CPT | Performed by: ORTHOPAEDIC SURGERY

## 2024-12-03 PROCEDURE — 74019 RADEX ABDOMEN 2 VIEWS: CPT | Mod: TC | Performed by: RADIOLOGY

## 2024-12-03 PROCEDURE — 73562 X-RAY EXAM OF KNEE 3: CPT | Mod: LT | Performed by: RADIOLOGY

## 2024-12-03 PROCEDURE — 99213 OFFICE O/P EST LOW 20 MIN: CPT

## 2024-12-03 PROCEDURE — 85025 COMPLETE CBC W/AUTO DIFF WBC: CPT

## 2024-12-03 RX ORDER — LACTULOSE 10 G/15ML
20 SOLUTION ORAL 2 TIMES DAILY
Qty: 300 ML | Refills: 0 | Status: SHIPPED | OUTPATIENT
Start: 2024-12-03 | End: 2024-12-08

## 2024-12-03 RX ORDER — DOCUSATE SODIUM 100 MG/1
100 CAPSULE, LIQUID FILLED ORAL 2 TIMES DAILY
Qty: 14 CAPSULE | Refills: 0 | Status: SHIPPED | OUTPATIENT
Start: 2024-12-03

## 2024-12-03 ASSESSMENT — KOOS JR
TWISING OR PIVOTING ON KNEE: MILD
HOW SEVERE IS YOUR KNEE STIFFNESS AFTER FIRST WAKING IN MORNING: MODERATE
STANDING UPRIGHT: MILD
GOING UP OR DOWN STAIRS: MILD
KOOS JR SCORING: 73.34

## 2024-12-03 ASSESSMENT — PAIN SCALES - GENERAL: PAINLEVEL_OUTOF10: MODERATE PAIN (4)

## 2024-12-03 NOTE — LETTER
12/3/2024      Freeman Velazquez  [address]  [facility] [address]      Dear Colleague,    Thank you for referring your patient, Freeman Velazquez, to the [facility]. Please see a copy of my visit note below.    Chief Complaint: Total Joint Post-op of the Left Knee (6wk s/p LTKA 10/23/24)     HPI: Freeman Velazquez returns today in follow-up forHis left knee.  Reports he is doing well.  He is using no pain medication.  He is just about discharge from physical therapy.  He is told his physical therapy range of motion was 0-1 10.  He is not using a cane.  He is back at work.  The only trouble he is run into is that he is gotten constipated.  It has been a number of days since he has had a bowel movement.  He tried an enema.  He is using Metamucil.      Medications and allergies are documented in the EMR and have been reviewed.      Current Outpatient Medications:     docusate sodium (COLACE) 100 MG capsule, Take 1 capsule (100 mg) by mouth 2 times daily., Disp: 14 capsule, Rfl: 0    acetaminophen (TYLENOL) 325 MG tablet, Take 1 tablet (325 mg) by mouth every 4 hours as needed for other (mild pain)., Disp: 50 tablet, Rfl: 0    aspirin 81 MG EC tablet, Take 1 tablet (81 mg) by mouth 2 times daily., Disp: 60 tablet, Rfl: 0    HYDROcodone-acetaminophen (NORCO) 5-325 MG tablet, Take 1 tablet by mouth every 4 hours as needed for pain., Disp: 30 tablet, Rfl: 0    oxyCODONE (ROXICODONE) 5 MG tablet, Take 1-2 tablets (5-10 mg) by mouth every 4 hours as needed for moderate to severe pain. (Patient not taking: Reported on 11/5/2024), Disp: 30 tablet, Rfl: 0    polyethylene glycol (MIRALAX) 17 GM/Dose powder, Take 1 Capful by mouth daily as needed for constipation., Disp: , Rfl:     senna-docusate (SENOKOT-S/PERICOLACE) 8.6-50 MG tablet, Take 1-2 tablets by mouth 2 times daily as needed for constipation. Take while on oral narcotics to prevent or treat constipation., Disp: 30 tablet, Rfl:  0    Allergies: Tetracycline and Erythromycin    Current Status:  KOOS Jr: 73.34  UCLA:  Global Mental Health Score -    Global Physical Health Score -    PROMIS TOTAL - SUBSCORES -      Physical Exam:  On physical examination the patient appears the stated age, is in no acute distress, affect is appropriate, and breathing is non-labored.    There were no vitals taken for this visit.  There is no height or weight on file to calculate BMI.    Rises from chair: Easily  Gait: Mild flexed knee gait  Appearance: benign  Clinical alignment: Neutral  Effusion: Small  Extension: 3  Flexion: 110  Patellar tracking: Normal  Collateral ligaments: intact  Posterior starting this make sure we look at the stable in in the sagittal plane in mid-flexion.    Distally, the circulatory, motor, and sensation exam is intact with 5/5 EHL, gastroc-soleus, and tibialis anterior.  Sensation to light touch is intact.  Dorsalis pedis and posterior tibialis pulses are palpable.  There are no sores on the feet, no bruising, and no lymphedema.    'We reviewed the radiographs together. These show the normal progression for a total knee arthroplasty without evidence of loosening or subsidence.     Assessment: Doing well 6-week status post left total knee.  Range of motion is coming along nicely functions coming along nicely.  Concerned about his constipation and risk of impaction.  I wrote for a prescription of Colace which is going to  today.  Discussed hydration.  He is also going to see his primary care doctor in the next couple days I think it is important he understands he is can make this appointment today.    Plan: As above and follow-up in 6 weeks.  Sooner if issues.  No ref. provider found      Again, thank you for allowing me to participate in the care of your patient.      Sincerely,    David Ramirez MD

## 2024-12-03 NOTE — PATIENT INSTRUCTIONS
Constipation    And that you have not stooled for a number of weeks after surgery, it is quite important that we make sure that we entice a bowel movement within the next few days.  Your constipation is likely related to narcotic use after surgery, which is not uncommon.  I am prescribing a medication called lactulose that you can use up to twice daily for constipation.  Other options that have been prescribed to you include Dulcolax and Senokot, and these can be used in combination with one another.    1. Consume at least 8 glasses of water per day   2. Exercise for at least 30 minutes every day. Regular exercise helps to keep your digestive system active and and healthy and may help with constipation.   3. Take advantage of opportunities - you are more like to have a bowel movement after waking and after eating. Do not postpone having a bowel movement if you feel the urge to go.   4. Increase dietary fiber. Goal of 25 grams per day for women, 35 grams per day for men. If unable to consume 25-35 grams through diet alone consider OTC supplements. Metamucil is the best choice. It requires the use of plenty of water to be effective. Can take days to weeks to take effect.   5. Prunes, prune juice, pears, and pear juice can be just as effective as Metamucil. 10 prunes = 6 grams of fiber.   6. Recommend taking Miralax (17 grams = 1 scoop). Take once daily, can mix with any liquid. If you experience increasing bowel movements and diarrhea, decrease to every other day or every 3rd day. Miralax is an osmotic laxative that increases the amount of water secreted by the intestines resulting in softer and easier to pass stools. It can take 1-4 days to work. It may be taken chronically if you drink enough water throughout the day.   7. If Miralax isn't enough, recommend stimulant laxative such as Senna, Ex Lax or Dulcolax. Stimulant laxatives speed up the colonic motility of your gut helping to induce a bowel movement. Take as  needed at     Please keep an eye on your symptoms.  If you have symptoms of severe pain that is acutely worsening, persistent vomiting, inability to pass a bowel movement within 72 hours, bleeding from your rectum, fever, chills, body aches, chest pain, shortness of breath, lightheadedness or dizziness, or blurry or double vision, please seek immediate medical attention.

## 2024-12-03 NOTE — PROGRESS NOTES
Assessment & Plan     (K59.03) Drug-induced constipation  (primary encounter diagnosis)  Comment: Subacute without improvement.  Vital signs are stable, no concerns for acute abdomen, and no findings that would warrant the need for immediate medical attention.  Constipation is clearly drug-induced related to narcotic use after knee surgery.  Some concerns at this time for bowel impaction and potentially moving towards concerns for perforation if we are not able to entice a bowel movement soon.  Prescribing lactulose, obtaining abdominal x-ray and basic lab work to ensure stability and safety to continue management from an outpatient standpoint.  Educated patient that if we are not able to entice a bowel movement within the next 48 or 72 hours, or if symptoms acutely worsen before this time, the next safest step is for him to report to the emergency department immediately. Offered education on medications including appropriate dosing, possible side effects, and possible adverse effects.  Education given on return to clinic instructions as well as alarm signs that would require the need for immediate medical attention.  Patient attested to understanding.  Plan: lactulose 20 GM/30ML solution, XR Abdomen 2         Views, CBC with platelets and differential,         Comprehensive metabolic panel (BMP + Alb, Alk         Phos, ALT, AST, Total. Bili, TP)        This progress note has been dictated, with use of voice recognition software. Any grammatical, typographical, or context errors are unintentional and inherent to use of voice recognition software.     Ordering of each unique test  Prescription drug management  I spent a total of 27 minutes on the day of the visit.   Time spent by me today doing chart review, history and exam, documentation and further activities per the note      FUTURE APPOINTMENTS:       - Follow-up visit in the emergency department in 48 to 72 hours if symptoms are not resolving       -  Follow-up for annual visit or as needed  Patient Instructions   Constipation    And that you have not stooled for a number of weeks after surgery, it is quite important that we make sure that we entice a bowel movement within the next few days.  Your constipation is likely related to narcotic use after surgery, which is not uncommon.  I am prescribing a medication called lactulose that you can use up to twice daily for constipation.  Other options that have been prescribed to you include Dulcolax and Senokot, and these can be used in combination with one another.    1. Consume at least 8 glasses of water per day   2. Exercise for at least 30 minutes every day. Regular exercise helps to keep your digestive system active and and healthy and may help with constipation.   3. Take advantage of opportunities - you are more like to have a bowel movement after waking and after eating. Do not postpone having a bowel movement if you feel the urge to go.   4. Increase dietary fiber. Goal of 25 grams per day for women, 35 grams per day for men. If unable to consume 25-35 grams through diet alone consider OTC supplements. Metamucil is the best choice. It requires the use of plenty of water to be effective. Can take days to weeks to take effect.   5. Prunes, prune juice, pears, and pear juice can be just as effective as Metamucil. 10 prunes = 6 grams of fiber.   6. Recommend taking Miralax (17 grams = 1 scoop). Take once daily, can mix with any liquid. If you experience increasing bowel movements and diarrhea, decrease to every other day or every 3rd day. Miralax is an osmotic laxative that increases the amount of water secreted by the intestines resulting in softer and easier to pass stools. It can take 1-4 days to work. It may be taken chronically if you drink enough water throughout the day.   7. If Miralax isn't enough, recommend stimulant laxative such as Senna, Ex Lax or Dulcolax. Stimulant laxatives speed up the colonic  "motility of your gut helping to induce a bowel movement. Take as needed at     Please keep an eye on your symptoms.  If you have symptoms of severe pain that is acutely worsening, persistent vomiting, inability to pass a bowel movement within 72 hours, bleeding from your rectum, fever, chills, body aches, chest pain, shortness of breath, lightheadedness or dizziness, or blurry or double vision, please seek immediate medical attention.      Subjective   Freeman is a 64 year old, presenting for the following health issues:  Constipation (Pt reports: \"Constipated. Has been on pain meds from surgery for 2 wks. No bm and uncomfortable. Surgeon advised appt asap\". Pt had a total knee replacement (L). Pt states that he had 2 small bowel movements 9 days ago. Pt reports discomfort. Pt was seen by surgeon for follow up today. Pt reports that he was advised to be seen today. Pt is no longer taking Oxycodone and has not for the past 3 weeks. Pt is currently only taking Tylenol. )        12/3/2024     1:37 PM   Additional Questions   Roomed by Sasha Irene of Present Illness       Reason for visit:  Severe post-surgical constipation   He is taking medications regularly.     Freeman is a 64-year-old male with a past medical history significant for renal colic, GERD, osteoarthritis of both knees status post left-sided knee surgery, and calculus of the ureter and kidneys who presents today with concerns for constipation.  Patient had a total left-sided knee replacement in October, and after that time his pain medication regimen was and alternating schedule of oxycodone and Tylenol.  He began this, but quickly developed a rash on his back related to the oxycodone, so he was switched to Norco.  He tried to wean himself off of the narcotics as quickly as possible, and has not taken any narcotics in the past 3 weeks.  He was taking MiraLAX daily postsurgically, as he knows he has a fairly low threshold for constipation related to " "narcotic use based on previous surgical procedures.  He notes that he did have a couple very small bowel movements early on.  A few weeks after surgery he did also try an enema, which did produce another small amount of stool.  He declines that he had any bowel movement for about 2 weeks, and tried an enema which did produce a very small amount of stool.  He notes that was approximately 2 weeks ago, and he has not had a bowel movement at all since until this morning when he produced an extremely small amount of stool.  He notes intermittent and fairly frequent colicky abdominal cramping that is mainly localized in his left lower quadrant.  He has experienced a small amount of lower back pain as well.  He has been able to pass gas, and declines any significant belching or reflux.  He has noted some very mild nausea, but declines any vomiting.  He declines any high-grade fever, blood from his rectum, or pain at his rectal opening.  He was seen for surgical follow-up today, and prescribed Dulcolax that he has not yet tried.  Other things that he has tried include Senokot from surgical discharge that was not helpful at all.  Patient declines any chest pain, shortness of breath, lightheadedness or dizziness, blurry or double vision, upper respiratory illness symptoms, or inability to eat or drink.    Review of Systems  Constitutional, HEENT, cardiovascular, pulmonary, gi and gu systems are negative, except as otherwise noted.      Objective    /79 (BP Location: Left arm, Patient Position: Sitting, Cuff Size: Adult Large)   Pulse 80   Temp 97.2  F (36.2  C) (Tympanic)   Resp 15   Ht 1.88 m (6' 2.02\")   Wt 86.9 kg (191 lb 8 oz)   SpO2 100%   BMI 24.58 kg/m    Body mass index is 24.58 kg/m .  Physical Exam   GENERAL: alert and no distress  NECK: no adenopathy, no asymmetry, masses, or scars  RESP: lungs clear to auscultation - no rales, rhonchi or wheezes  CV: regular rate and rhythm, normal S1 S2, no S3 or S4, " no murmur, click or rub, no peripheral edema  ABDOMEN: soft and mildly rounded, mildly tender in bilateral lower quadrants, no hepatosplenomegaly, no masses and bowel sounds normal  MS: no gross musculoskeletal defects noted, no edema    Swapna Najera DNP FNP-C  Family Nurse Practitioner - Same Day Provider  [facility] [place]        Signed Electronically by: MARK Nelson CNP

## 2024-12-03 NOTE — PROGRESS NOTES
Chief Complaint: Total Joint Post-op of the Left Knee (6wk s/p LTKA 10/23/24)         HPI: Freeman Velazquez returns today in follow-up forHis left knee.  Reports he is doing well.  He is using no pain medication.  He is just about discharge from physical therapy.  He is told his physical therapy range of motion was 0-1 10.  He is not using a cane.  He is back at work.  The only trouble he is run into is that he is gotten constipated.  It has been a number of days since he has had a bowel movement.  He tried an enema.  He is using Metamucil.        Medications and allergies are documented in the EMR and have been reviewed.      Current Outpatient Medications:     docusate sodium (COLACE) 100 MG capsule, Take 1 capsule (100 mg) by mouth 2 times daily., Disp: 14 capsule, Rfl: 0    acetaminophen (TYLENOL) 325 MG tablet, Take 1 tablet (325 mg) by mouth every 4 hours as needed for other (mild pain)., Disp: 50 tablet, Rfl: 0    aspirin 81 MG EC tablet, Take 1 tablet (81 mg) by mouth 2 times daily., Disp: 60 tablet, Rfl: 0    HYDROcodone-acetaminophen (NORCO) 5-325 MG tablet, Take 1 tablet by mouth every 4 hours as needed for pain., Disp: 30 tablet, Rfl: 0    oxyCODONE (ROXICODONE) 5 MG tablet, Take 1-2 tablets (5-10 mg) by mouth every 4 hours as needed for moderate to severe pain. (Patient not taking: Reported on 11/5/2024), Disp: 30 tablet, Rfl: 0    polyethylene glycol (MIRALAX) 17 GM/Dose powder, Take 1 Capful by mouth daily as needed for constipation., Disp: , Rfl:     senna-docusate (SENOKOT-S/PERICOLACE) 8.6-50 MG tablet, Take 1-2 tablets by mouth 2 times daily as needed for constipation. Take while on oral narcotics to prevent or treat constipation., Disp: 30 tablet, Rfl: 0    Allergies: Tetracycline and Erythromycin    Current Status:  KOOS Jr: 73.34  UCLA:  Global Mental Health Score -    Global Physical Health Score -    PROMIS TOTAL - SUBSCORES -      Physical Exam:  On physical examination the patient appears  the stated age, is in no acute distress, affect is appropriate, and breathing is non-labored.    There were no vitals taken for this visit.  There is no height or weight on file to calculate BMI.    Rises from chair: Easily  Gait: Mild flexed knee gait  Appearance: benign  Clinical alignment: Neutral  Effusion: Small  Extension: 3  Flexion: 110  Patellar tracking: Normal  Collateral ligaments: intact  Posterior starting this make sure we look at the stable in in the sagittal plane in mid-flexion.    Distally, the circulatory, motor, and sensation exam is intact with 5/5 EHL, gastroc-soleus, and tibialis anterior.  Sensation to light touch is intact.  Dorsalis pedis and posterior tibialis pulses are palpable.  There are no sores on the feet, no bruising, and no lymphedema.    'We reviewed the radiographs together. These show the normal progression for a total knee arthroplasty without evidence of loosening or subsidence.     Assessment: Doing well 6-week status post left total knee.  Range of motion is coming along nicely functions coming along nicely.  Concerned about his constipation and risk of impaction.  I wrote for a prescription of Colace which is going to  today.  Discussed hydration.  He is also going to see his primary care doctor in the next couple days I think it is important he understands he is can make this appointment today.    Plan: As above and follow-up in 6 weeks.  Sooner if issues.  No ref. provider found

## 2024-12-03 NOTE — NURSING NOTE
Freeman Velazquez's chief complaint for this visit includes:  Chief Complaint   Patient presents with    Left Knee - Total Joint Post-op     6wk s/p LTKA 10/23/24       Referring Provider:  No referring provider defined for this encounter.    There were no vitals taken for this visit.  Data Unavailable   Global Mental Health Score:    Global Physical Health Score:    PROMIS TOTAL - SUBSCORES:    UCLA: 4  KOOS Jr.  73.34     Pain increases with: painful at night but nothing major  Previous surgeries: LTKA 10/23/24  Previous injections within last 6 months: NO  Treatments done: PT/HEP going well  Imaging completed: XR today  Pain: 1/10  Concerns: Has not been able to produce a bowel movement in some days. Has been working through triage steps but getting concerned.     Miguel Chappell, ATC

## 2024-12-04 LAB
ALBUMIN SERPL BCG-MCNC: 4.1 G/DL (ref 3.5–5.2)
ALP SERPL-CCNC: 201 U/L (ref 40–150)
ALT SERPL W P-5'-P-CCNC: 21 U/L (ref 0–70)
ANION GAP SERPL CALCULATED.3IONS-SCNC: 9 MMOL/L (ref 7–15)
AST SERPL W P-5'-P-CCNC: 19 U/L (ref 0–45)
BILIRUB SERPL-MCNC: 0.5 MG/DL
BUN SERPL-MCNC: 16.9 MG/DL (ref 8–23)
CALCIUM SERPL-MCNC: 9.2 MG/DL (ref 8.8–10.4)
CHLORIDE SERPL-SCNC: 103 MMOL/L (ref 98–107)
CREAT SERPL-MCNC: 1.02 MG/DL (ref 0.67–1.17)
EGFRCR SERPLBLD CKD-EPI 2021: 81 ML/MIN/1.73M2
GLUCOSE SERPL-MCNC: 97 MG/DL (ref 70–99)
HCO3 SERPL-SCNC: 26 MMOL/L (ref 22–29)
POTASSIUM SERPL-SCNC: 4.6 MMOL/L (ref 3.4–5.3)
PROT SERPL-MCNC: 7.7 G/DL (ref 6.4–8.3)
SODIUM SERPL-SCNC: 138 MMOL/L (ref 135–145)

## 2024-12-05 ENCOUNTER — THERAPY VISIT (OUTPATIENT)
Dept: PHYSICAL THERAPY | Facility: CLINIC | Age: 64
End: 2024-12-05
Payer: MEDICARE

## 2024-12-05 DIAGNOSIS — Z96.652 STATUS POST TOTAL LEFT KNEE REPLACEMENT: Primary | ICD-10-CM

## 2025-01-14 ENCOUNTER — OFFICE VISIT (OUTPATIENT)
Dept: ORTHOPEDICS | Facility: CLINIC | Age: 65
End: 2025-01-14
Payer: MEDICARE

## 2025-01-14 DIAGNOSIS — Z96.652 STATUS POST TOTAL LEFT KNEE REPLACEMENT: Primary | ICD-10-CM

## 2025-01-14 PROCEDURE — 99024 POSTOP FOLLOW-UP VISIT: CPT | Performed by: ORTHOPAEDIC SURGERY

## 2025-01-14 ASSESSMENT — KOOS JR
KOOS JR SCORING: 70.7
RISING FROM SITTING: MILD
STANDING UPRIGHT: MILD
GOING UP OR DOWN STAIRS: MILD
TWISING OR PIVOTING ON KNEE: MILD
HOW SEVERE IS YOUR KNEE STIFFNESS AFTER FIRST WAKING IN MORNING: MODERATE

## 2025-01-14 NOTE — NURSING NOTE
Freeman Velazquez's chief complaint for this visit includes:  Chief Complaint   Patient presents with    Left Knee - Total Joint Post-op     12wk s/p LTKA 10/23/24       Referring Provider:  Referred Levi, MD  No address on file    There were no vitals taken for this visit.  Data Unavailable   Global Mental Health Score:    Global Physical Health Score:    PROMIS TOTAL - SUBSCORES:    UCLA: 5  KOOS Jr.  70.7     Pain increases with: muscle soreness  Previous surgeries: LTKA 10/23/24  Previous injections within last 6 months: NO  Treatments done: PT/HEP  Imaging completed: na  Pain: 1/10  Concerns: None.     Miguel Chappell, ATC

## 2025-01-14 NOTE — PROGRESS NOTES
Chief Complaint: Total Joint Post-op of the Left Knee (12wk s/p LTKA 10/23/24)         HPI: Freeman Velazquez returns today in follow-up for his left total knee. He reports that he is doing well. He is using no pain medication, no assist device, and has been discharge from PT and is ready to go walk a round of golf. Happy with how he is doing so far.      Medications and allergies are documented in the EMR and have been reviewed.      Current Outpatient Medications:     acetaminophen (TYLENOL) 325 MG tablet, Take 1 tablet (325 mg) by mouth every 4 hours as needed for other (mild pain)., Disp: 50 tablet, Rfl: 0    aspirin 81 MG EC tablet, Take 1 tablet (81 mg) by mouth 2 times daily. (Patient not taking: Reported on 12/3/2024), Disp: 60 tablet, Rfl: 0    docusate sodium (COLACE) 100 MG capsule, Take 1 capsule (100 mg) by mouth 2 times daily. (Patient not taking: Reported on 12/3/2024), Disp: 14 capsule, Rfl: 0    HYDROcodone-acetaminophen (NORCO) 5-325 MG tablet, Take 1 tablet by mouth every 4 hours as needed for pain. (Patient not taking: Reported on 12/3/2024), Disp: 30 tablet, Rfl: 0    oxyCODONE (ROXICODONE) 5 MG tablet, Take 1-2 tablets (5-10 mg) by mouth every 4 hours as needed for moderate to severe pain. (Patient not taking: Reported on 12/3/2024), Disp: 30 tablet, Rfl: 0    polyethylene glycol (MIRALAX) 17 GM/Dose powder, Take 1 Capful by mouth daily as needed for constipation., Disp: , Rfl:     senna-docusate (SENOKOT-S/PERICOLACE) 8.6-50 MG tablet, Take 1-2 tablets by mouth 2 times daily as needed for constipation. Take while on oral narcotics to prevent or treat constipation. (Patient not taking: Reported on 12/3/2024), Disp: 30 tablet, Rfl: 0    Allergies: Tetracycline and Erythromycin    Current Status:  KOOS Jr:  UCLA:  Global Mental Health Score -    Global Physical Health Score -    PROMIS TOTAL - SUBSCORES -      Physical Exam:  On physical examination the patient appears the stated age, is in no  acute distress, affect is appropriate, and breathing is non-labored.    There were no vitals taken for this visit.  There is no height or weight on file to calculate BMI.    Rises from chair: easily   Gait: normal  Appearance: benign  Clinical alignment: neutral   Effusion:no  Tenderness to palpation:min  Extension:3  Flexion: 110  Patellar tracking: normal   Collateral ligaments: intact  Stable in in the sagittal plane in mid-flexion.  Motor function is normal and symmetric in the sciatic distribution.     Assessment: doing well. Discussed activities on total knee. Discused follow-up. All the patient's questions were answered to the best of my ability.       Plan: RTC in 12 months, sooner if issues.   Referred Self

## 2025-01-14 NOTE — LETTER
1/14/2025      Freeman Velazquez  [address]  [facility] [address]      Dear Colleague,    Thank you for referring your patient, Freeman Velazquez, to the [facility]. Please see a copy of my visit note below.    Chief Complaint: Total Joint Post-op of the Left Knee (12wk s/p LTKA 10/23/24)         HPI: Freeman Velazquez returns today in follow-up for his left total knee. He reports that he is doing well. He is using no pain medication, no assist device, and has been discharge from PT and is ready to go walk a round of golf. Happy with how he is doing so far.      Medications and allergies are documented in the EMR and have been reviewed.      Current Outpatient Medications:      acetaminophen (TYLENOL) 325 MG tablet, Take 1 tablet (325 mg) by mouth every 4 hours as needed for other (mild pain)., Disp: 50 tablet, Rfl: 0     aspirin 81 MG EC tablet, Take 1 tablet (81 mg) by mouth 2 times daily. (Patient not taking: Reported on 12/3/2024), Disp: 60 tablet, Rfl: 0     docusate sodium (COLACE) 100 MG capsule, Take 1 capsule (100 mg) by mouth 2 times daily. (Patient not taking: Reported on 12/3/2024), Disp: 14 capsule, Rfl: 0     HYDROcodone-acetaminophen (NORCO) 5-325 MG tablet, Take 1 tablet by mouth every 4 hours as needed for pain. (Patient not taking: Reported on 12/3/2024), Disp: 30 tablet, Rfl: 0     oxyCODONE (ROXICODONE) 5 MG tablet, Take 1-2 tablets (5-10 mg) by mouth every 4 hours as needed for moderate to severe pain. (Patient not taking: Reported on 12/3/2024), Disp: 30 tablet, Rfl: 0     polyethylene glycol (MIRALAX) 17 GM/Dose powder, Take 1 Capful by mouth daily as needed for constipation., Disp: , Rfl:      senna-docusate (SENOKOT-S/PERICOLACE) 8.6-50 MG tablet, Take 1-2 tablets by mouth 2 times daily as needed for constipation. Take while on oral narcotics to prevent or treat constipation. (Patient not taking: Reported on 12/3/2024), Disp: 30 tablet, Rfl:  0    Allergies: Tetracycline and Erythromycin    Current Status:  KOOS Jr:  UCLA:  Global Mental Health Score -    Global Physical Health Score -    PROMIS TOTAL - SUBSCORES -      Physical Exam:  On physical examination the patient appears the stated age, is in no acute distress, affect is appropriate, and breathing is non-labored.    There were no vitals taken for this visit.  There is no height or weight on file to calculate BMI.    Rises from chair: easily   Gait: normal  Appearance: benign  Clinical alignment: neutral   Effusion:no  Tenderness to palpation:min  Extension:3  Flexion: 110  Patellar tracking: normal   Collateral ligaments: intact  Stable in in the sagittal plane in mid-flexion.  Motor function is normal and symmetric in the sciatic distribution.     Assessment: doing well. Discussed activities on total knee. Discused follow-up. All the patient's questions were answered to the best of my ability.       Plan: RTC in 12 months, sooner if issues.   Referred Self      Again, thank you for allowing me to participate in the care of your patient.        Sincerely,    David Ramirez MD    Electronically signed

## 2025-02-02 ENCOUNTER — HEALTH MAINTENANCE LETTER (OUTPATIENT)
Age: 65
End: 2025-02-02

## 2025-02-05 ENCOUNTER — TELEPHONE (OUTPATIENT)
Dept: FAMILY MEDICINE | Facility: CLINIC | Age: 65
End: 2025-02-05
Payer: MEDICARE

## 2025-02-05 NOTE — TELEPHONE ENCOUNTER
Patient Quality Outreach    Patient is due for the following:   Physical Annual Wellness Visit      Topic Date Due    Pneumococcal Vaccine (1 of 1 - PCV) Never done    Zoster (Shingles) Vaccine (1 of 2) Never done    Diptheria Tetanus Pertussis (DTAP/TDAP/TD) Vaccine (2 - Td or Tdap) 09/13/2021     Chronic Opioid Use -  Treatment Agreement (CSA), Urine Drug Screen, HERNAN-7, and PHQ-9    Action(s) Taken:   Schedule a Annual Wellness Visit    Type of outreach:    Sent [employer] message.    Questions for provider review:    None           Vonda Moncada CMA  Chart routed to Care Team.

## 2025-03-13 ENCOUNTER — OFFICE VISIT (OUTPATIENT)
Dept: DERMATOLOGY | Facility: CLINIC | Age: 65
End: 2025-03-13
Payer: MEDICARE

## 2025-03-13 DIAGNOSIS — D18.01 ANGIOMA OF SKIN: ICD-10-CM

## 2025-03-13 DIAGNOSIS — L82.1 SEBORRHEIC KERATOSES: ICD-10-CM

## 2025-03-13 DIAGNOSIS — L81.4 LENTIGO: ICD-10-CM

## 2025-03-13 DIAGNOSIS — L82.0 INFLAMED SEBORRHEIC KERATOSIS: ICD-10-CM

## 2025-03-13 DIAGNOSIS — D22.9 NEVUS: Primary | ICD-10-CM

## 2025-03-13 DIAGNOSIS — L57.0 ACTINIC KERATOSIS: ICD-10-CM

## 2025-03-13 DIAGNOSIS — D48.5 NEOPLASM OF UNCERTAIN BEHAVIOR OF SKIN: ICD-10-CM

## 2025-03-13 ASSESSMENT — PAIN SCALES - GENERAL: PAINLEVEL_OUTOF10: NO PAIN (0)

## 2025-03-13 NOTE — NURSING NOTE
Chief Complaint   Patient presents with    Skin Check     Spots on left arm, right temple and left brow        There were no vitals filed for this visit.  Wt Readings from Last 1 Encounters:   12/03/24 86.9 kg (191 lb 8 oz)       Angie Champagne LPN .................3/13/2025

## 2025-03-13 NOTE — LETTER
3/13/2025      Freeman Velazquez  [address]  [facility] [address]      Dear Colleague,    Thank you for referring your patient, Freeman Velazquez, to the [facility]. Please see a copy of my visit note below.    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   On review of systems, there are no further skin complaints, patient is feeling otherwise well.   ROS of the following were done and are negative: Constitutional, Eyes, Ears, Nose,   Mouth, Throat, Cardiovascular, Respiratory, GI, Genitourinary, Musculoskeletal,   Psychiatric, Endocrine, Allergic/Immunologic.    PHYSICAL EXAM:   There were no vitals taken for this visit.  Skin exam performed as follows: Type 2 skin. Mood appropriate  Alert and Oriented X 3. Well developed, well nourished in no distress.  General appearance: Normal  Head including face: Normal  Eyes: conjunctiva and lids: Normal  Mouth: Lips, teeth, gums: Normal  Neck: Normal  Chest-breast/axillae: Normal  Back: Normal  Extremities: digits/nails (clubbing): Normal  Eccrine and Apocrine glands: Normal  Right upper extremity: Normal  Left upper extremity: Normal  Right lower extremity: Normal  Left lower extremity: Normal  Skin: Scalp and body hair: See below    Pt deferred exam of breasts, groin, buttocks: No    Other physical findings:  1.  Multiple pigmented macules on extremities and trunk  2. Multiple pigmented macules on face, trunk and extremities  3. Multiple vascular papules on trunk, arms and legs  4. Multiple scattered keratotic plaques  5. Pink gritty papule on the right forehead x 1, right temple x 2  6. 5 mm pink papule on the left forearm proximal  7. Irregular 5 mm brown macule on the left forearm distal  8. 4 mm pink papule on the right superior shoulder  9. Inflamed keratotic papule on the left forehead x 6         Except as noted above, no other signs of skin cancer or melanoma.     ASSESSMENT/PLAN:   Benign Full skin cancer screening today. . Patient with history of none; fhx of melanoma in father; numerous family members   Advised on monthly self exams and 1 year  Patient Education: Appropriate brochures given.    Multiple benign appearing melanocytic nevi on arms, legs and trunk. Discussed ABCDEs of melanoma and sunscreen.   Multiple lentigos on arms, legs and trunk. Advised benign, no treatment needed.  Multiple scattered angiomas. Advised benign, no treatment needed.   Seborrheic keratosis on arms, legs and trunk. Advised benign, no treatment needed.  Actinic keratosis on the right forehead x 1, right temple x 2. As precancerous, cryosurgery performed. Advised on blistering and post-op care. Advised if not resolved in 1-2 months to return for evaluation  R/o BCC on the left forearm proximal, right superior shoulder. Shave biopsy performed.  Area cleaned and anesthetized with 1% lidocaine with epinephrine.  Dermablade used to remove the lesion and sent to pathology. Bleeding was cauterized. Pt tolerated procedure well with no complications.   R/o atypical nevus on the right forearm distal. Shave biopsy performed.  Area cleaned and anesthetized with 1% lidocaine with epinephrine.  Dermablade used to remove the lesion and sent to pathology. Bleeding was cauterized. Pt tolerated procedure well with no complications.   Inflamed  seborrheic keratosis on the left forehead x 6. As physically tender cryosurgery performed. Advised on post op care.               Follow-up: yearly/PRN sooner    1.) Patient was asked about new and changing moles. YES  2.) Patient received a complete physical skin examination: YES  3.) Patient was counseled to perform a monthly self skin examination: YES  Scribed By: Nora Myers MS, PA-C      Again, thank you for allowing me to participate in the care of your patient.        Sincerely,        Nora Myers PA-C    Electronically signed

## 2025-03-13 NOTE — PATIENT INSTRUCTIONS
Wound Care Instructions     FOR SUPERFICIAL WOUNDS     [facility]  [phone]                 AFTER 24 HOURS YOU SHOULD REMOVE THE BANDAGE AND BEGIN DAILY DRESSING CHANGES AS FOLLOWS:     1) Remove Dressing.     2) Clean and dry the area with tap water using a Q-tip or sterile gauze pad.     3) Apply Vaseline, Aquaphor, Polysporin ointment or Bacitracin ointment over entire wound.  Do NOT use Neosporin ointment.     4) Cover the wound with a band-aid, or a sterile non-stick gauze pad and micropore paper tape    REPEAT THESE INSTRUCTIONS AT LEAST ONCE A DAY UNTIL THE WOUND HAS COMPLETELY HEALED.    It is an old wives tale that a wound heals better when it is exposed to air and allowed to dry out. The wound will heal faster with a better cosmetic result if it is kept moist with ointment and covered with a bandage.    **Do not let the wound dry out.**    Supplies Needed:      *Cotton tipped applicators (Q-tips)    *Vaseline, Aquaphor, Polysporin or Bacitracin Ointment (NOT NEOSPORIN)    *Band-aids or non-stick gauze pads and micropore paper tape.      PATIENT INFORMATION:    During the healing process you will notice a number of changes. All wounds develop a small halo of redness surrounding the wound.  This means healing is occurring. Severe itching with extensive redness usually indicates sensitivity to the ointment or bandage tape used to dress the wound.  You should call our office if this develops.      Swelling  and/or discoloration around your surgical site is common, particularly when performed around the eye.    All wounds normally drain.  The larger the wound the more drainage there will be.  After 7-10 days, you will notice the wound beginning to shrink and new skin will begin to grow.  The wound is healed when you can see skin has formed over the entire area.  A healed wound has a healthy, shiny look to the surface and is red to dark pink in color to normalize.  Wounds may  take approximately 4-6 weeks to heal.  Larger wounds may take 6-8 weeks.  After the wound is healed you may discontinue dressing changes.    You may experience a sensation of tightness as your wound heals. This is normal and will gradually subside.    Your healed wound may be sensitive to temperature changes. This sensitivity improves with time, but if you re having a lot of discomfort, try to avoid temperature extremes.    Patients frequently experience itching after their wound appears to have healed because of the continue healing under the skin.  Plain Vaseline will help relieve the itching.      POSSIBLE COMPLICATIONS    BLEEDING:    Leave the bandage in place.  Use tightly rolled up gauze or a cloth to apply direct pressure over the bandage for 30  minutes.  Reapply pressure for an additional 30 minutes if necessary  Use additional gauze and tape to maintain pressure once the bleeding has stopped.

## 2025-03-13 NOTE — PROGRESS NOTES
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   On review of systems, there are no further skin complaints, patient is feeling otherwise well.   ROS of the following were done and are negative: Constitutional, Eyes, Ears, Nose,   Mouth, Throat, Cardiovascular, Respiratory, GI, Genitourinary, Musculoskeletal,   Psychiatric, Endocrine, Allergic/Immunologic.    PHYSICAL EXAM:   There were no vitals taken for this visit.  Skin exam performed as follows: Type 2 skin. Mood appropriate  Alert and Oriented X 3. Well developed, well nourished in no distress.  General appearance: Normal  Head including face: Normal  Eyes: conjunctiva and lids: Normal  Mouth: Lips, teeth, gums: Normal  Neck: Normal  Chest-breast/axillae: Normal  Back: Normal  Extremities: digits/nails (clubbing): Normal  Eccrine and Apocrine glands: Normal  Right upper extremity: Normal  Left upper extremity: Normal  Right lower extremity: Normal  Left lower extremity: Normal  Skin: Scalp and body hair: See below    Pt deferred exam of breasts, groin, buttocks: No    Other physical findings:  1. Multiple pigmented macules on extremities and trunk  2. Multiple pigmented macules on face, trunk and extremities  3. Multiple vascular papules on trunk, arms and legs  4. Multiple scattered keratotic plaques  5. Pink gritty papule on the right forehead  x 1, right temple x 2  6. 5 mm pink papule on the left forearm proximal  7. Irregular 5 mm brown macule on the left forearm distal  8. 4 mm pink papule on the right superior shoulder  9. Inflamed keratotic papule on the left forehead x 6         Except as noted above, no other signs of skin cancer or melanoma.     ASSESSMENT/PLAN:   Benign Full skin cancer screening today. . Patient with history of none; fhx of melanoma in father; numerous family members   Advised on monthly self exams and 1 year  Patient Education: Appropriate brochures given.    Multiple benign appearing melanocytic nevi on arms, legs and trunk. Discussed ABCDEs of melanoma and sunscreen.   Multiple lentigos on arms, legs and trunk. Advised benign, no treatment needed.  Multiple scattered angiomas. Advised benign, no treatment needed.   Seborrheic keratosis on arms, legs and trunk. Advised benign, no treatment needed.  Actinic keratosis on the right forehead x 1, right temple x 2. As precancerous, cryosurgery performed. Advised on blistering and post-op care. Advised if not resolved in 1-2 months to return for evaluation  R/o BCC on the left forearm proximal, right superior shoulder. Shave biopsy performed.  Area cleaned and anesthetized with 1% lidocaine with epinephrine.  Dermablade used to remove the lesion and sent to pathology. Bleeding was cauterized. Pt tolerated procedure well with no complications.   R/o atypical nevus on the right forearm distal. Shave biopsy performed.  Area cleaned and anesthetized with 1% lidocaine with epinephrine.  Dermablade used to remove the lesion and sent to pathology. Bleeding was cauterized. Pt tolerated procedure well with no complications.   Inflamed seborrheic keratosis on the left forehead x 6. As physically tender cryosurgery performed. Advised on post op care.               Follow-up: yearly/PRN sooner    1.) Patient was asked about new and changing moles. YES  2.) Patient received a complete physical  skin examination: YES  3.) Patient was counseled to perform a monthly self skin examination: YES  Scribed By: Nora Myers MS, PA-C

## 2025-03-19 LAB
PATH REPORT.COMMENTS IMP SPEC: NORMAL
PATH REPORT.COMMENTS IMP SPEC: NORMAL
PATH REPORT.FINAL DX SPEC: NORMAL
PATH REPORT.GROSS SPEC: NORMAL
PATH REPORT.MICROSCOPIC SPEC OTHER STN: NORMAL
PATH REPORT.RELEVANT HX SPEC: NORMAL

## 2025-04-03 PROBLEM — Z96.652 STATUS POST TOTAL LEFT KNEE REPLACEMENT: Status: RESOLVED | Noted: 2024-10-29 | Resolved: 2025-04-03

## 2025-06-11 ENCOUNTER — TELEPHONE (OUTPATIENT)
Dept: FAMILY MEDICINE | Facility: CLINIC | Age: 65
End: 2025-06-11
Payer: MEDICARE

## 2025-06-11 NOTE — TELEPHONE ENCOUNTER
Patient Quality Outreach    Patient is due for the following:   Physical Annual Wellness Visit      Topic Date Due    Pneumococcal Vaccine (1 of 1 - PCV) Never done    Zoster (Shingles) Vaccine (1 of 2) Never done    Diptheria Tetanus Pertussis (DTAP/TDAP/TD) Vaccine (2 - Td or Tdap) 09/13/2021    COVID-19 Vaccine (7 - 2024-25 season) 04/09/2025     Eye Exam    Action(s) Taken:   Schedule a Annual Wellness Visit    Type of outreach:    Sent [employer] message.    Questions for provider review:    None         Vonda Moncada CMA  Chart routed to Care Team.

## 2025-08-04 ENCOUNTER — OFFICE VISIT (OUTPATIENT)
Dept: FAMILY MEDICINE | Facility: CLINIC | Age: 65
End: 2025-08-04
Payer: MEDICARE

## 2025-08-04 VITALS
BODY MASS INDEX: 25.04 KG/M2 | HEIGHT: 74 IN | SYSTOLIC BLOOD PRESSURE: 131 MMHG | OXYGEN SATURATION: 98 % | WEIGHT: 195.13 LBS | HEART RATE: 61 BPM | DIASTOLIC BLOOD PRESSURE: 83 MMHG | RESPIRATION RATE: 16 BRPM | TEMPERATURE: 97.2 F

## 2025-08-04 DIAGNOSIS — R10.11 RUQ ABDOMINAL PAIN: ICD-10-CM

## 2025-08-04 DIAGNOSIS — R13.10 SWALLOWING PROBLEM: ICD-10-CM

## 2025-08-04 DIAGNOSIS — K21.9 GASTROESOPHAGEAL REFLUX DISEASE, UNSPECIFIED WHETHER ESOPHAGITIS PRESENT: Primary | ICD-10-CM

## 2025-08-04 DIAGNOSIS — Z13.1 SCREENING FOR DIABETES MELLITUS: ICD-10-CM

## 2025-08-04 DIAGNOSIS — Z87.19 HISTORY OF ESOPHAGEAL STRICTURE: ICD-10-CM

## 2025-08-04 LAB
ALBUMIN SERPL BCG-MCNC: 4 G/DL (ref 3.5–5.2)
ALP SERPL-CCNC: 147 U/L (ref 40–150)
ALT SERPL W P-5'-P-CCNC: 27 U/L (ref 0–70)
ANION GAP SERPL CALCULATED.3IONS-SCNC: 11 MMOL/L (ref 7–15)
AST SERPL W P-5'-P-CCNC: 30 U/L (ref 0–45)
BASOPHILS # BLD AUTO: 0 10E3/UL (ref 0–0.2)
BASOPHILS NFR BLD AUTO: 0 %
BILIRUB SERPL-MCNC: 0.5 MG/DL
BUN SERPL-MCNC: 19.7 MG/DL (ref 8–23)
CALCIUM SERPL-MCNC: 9.1 MG/DL (ref 8.8–10.4)
CHLORIDE SERPL-SCNC: 107 MMOL/L (ref 98–107)
CREAT SERPL-MCNC: 1.02 MG/DL (ref 0.67–1.17)
EGFRCR SERPLBLD CKD-EPI 2021: 81 ML/MIN/1.73M2
EOSINOPHIL # BLD AUTO: 0.2 10E3/UL (ref 0–0.7)
EOSINOPHIL NFR BLD AUTO: 3 %
ERYTHROCYTE [DISTWIDTH] IN BLOOD BY AUTOMATED COUNT: 13.8 % (ref 10–15)
EST. AVERAGE GLUCOSE BLD GHB EST-MCNC: 111 MG/DL
GLUCOSE SERPL-MCNC: 104 MG/DL (ref 70–99)
HBA1C MFR BLD: 5.5 % (ref 0–5.6)
HCO3 SERPL-SCNC: 24 MMOL/L (ref 22–29)
HCT VFR BLD AUTO: 44.7 % (ref 40–53)
HGB BLD-MCNC: 14.5 G/DL (ref 13.3–17.7)
IMM GRANULOCYTES # BLD: 0 10E3/UL
IMM GRANULOCYTES NFR BLD: 0 %
LIPASE SERPL-CCNC: 32 U/L (ref 13–60)
LYMPHOCYTES # BLD AUTO: 1.5 10E3/UL (ref 0.8–5.3)
LYMPHOCYTES NFR BLD AUTO: 28 %
MCH RBC QN AUTO: 29.1 PG (ref 26.5–33)
MCHC RBC AUTO-ENTMCNC: 32.4 G/DL (ref 31.5–36.5)
MCV RBC AUTO: 90 FL (ref 78–100)
MONOCYTES # BLD AUTO: 0.5 10E3/UL (ref 0–1.3)
MONOCYTES NFR BLD AUTO: 10 %
NEUTROPHILS # BLD AUTO: 3.2 10E3/UL (ref 1.6–8.3)
NEUTROPHILS NFR BLD AUTO: 60 %
PLATELET # BLD AUTO: 295 10E3/UL (ref 150–450)
POTASSIUM SERPL-SCNC: 4.4 MMOL/L (ref 3.4–5.3)
PROT SERPL-MCNC: 7.4 G/DL (ref 6.4–8.3)
RBC # BLD AUTO: 4.98 10E6/UL (ref 4.4–5.9)
SODIUM SERPL-SCNC: 142 MMOL/L (ref 135–145)
WBC # BLD AUTO: 5.4 10E3/UL (ref 4–11)

## 2025-08-04 PROCEDURE — 83036 HEMOGLOBIN GLYCOSYLATED A1C: CPT | Performed by: FAMILY MEDICINE

## 2025-08-04 PROCEDURE — 3079F DIAST BP 80-89 MM HG: CPT | Performed by: FAMILY MEDICINE

## 2025-08-04 PROCEDURE — 3075F SYST BP GE 130 - 139MM HG: CPT | Performed by: FAMILY MEDICINE

## 2025-08-04 PROCEDURE — 3044F HG A1C LEVEL LT 7.0%: CPT | Performed by: FAMILY MEDICINE

## 2025-08-04 PROCEDURE — 83690 ASSAY OF LIPASE: CPT | Performed by: FAMILY MEDICINE

## 2025-08-04 PROCEDURE — 99214 OFFICE O/P EST MOD 30 MIN: CPT | Performed by: FAMILY MEDICINE

## 2025-08-04 PROCEDURE — 1126F AMNT PAIN NOTED NONE PRSNT: CPT | Performed by: FAMILY MEDICINE

## 2025-08-04 PROCEDURE — 85025 COMPLETE CBC W/AUTO DIFF WBC: CPT | Performed by: FAMILY MEDICINE

## 2025-08-04 PROCEDURE — 36415 COLL VENOUS BLD VENIPUNCTURE: CPT | Performed by: FAMILY MEDICINE

## 2025-08-04 PROCEDURE — 80053 COMPREHEN METABOLIC PANEL: CPT | Performed by: FAMILY MEDICINE

## 2025-08-04 ASSESSMENT — PAIN SCALES - GENERAL: PAINLEVEL_OUTOF10: NO PAIN (0)

## 2025-08-08 ENCOUNTER — OFFICE VISIT (OUTPATIENT)
Dept: OPHTHALMOLOGY | Facility: CLINIC | Age: 65
End: 2025-08-08
Payer: MEDICARE

## 2025-08-08 DIAGNOSIS — H43.813 POSTERIOR VITREOUS DETACHMENT OF BOTH EYES: Primary | ICD-10-CM

## 2025-08-08 DIAGNOSIS — H52.4 PRESBYOPIA: ICD-10-CM

## 2025-08-08 PROCEDURE — 92004 COMPRE OPH EXAM NEW PT 1/>: CPT | Performed by: OPHTHALMOLOGY

## 2025-08-08 PROCEDURE — 92015 DETERMINE REFRACTIVE STATE: CPT | Mod: GY | Performed by: OPHTHALMOLOGY

## 2025-08-08 ASSESSMENT — CONF VISUAL FIELD
OD_SUPERIOR_NASAL_RESTRICTION: 0
OS_SUPERIOR_TEMPORAL_RESTRICTION: 0
OD_NORMAL: 1
OS_NORMAL: 1
OS_INFERIOR_NASAL_RESTRICTION: 0
OD_INFERIOR_NASAL_RESTRICTION: 0
OS_SUPERIOR_NASAL_RESTRICTION: 0
OS_INFERIOR_TEMPORAL_RESTRICTION: 0
OD_SUPERIOR_TEMPORAL_RESTRICTION: 0
OD_INFERIOR_TEMPORAL_RESTRICTION: 0

## 2025-08-08 ASSESSMENT — REFRACTION_MANIFEST
OD_AXIS: 028
OS_SPHERE: -1.50
OD_SPHERE: -1.50
OD_CYLINDER: +0.50
OS_AXIS: 171
OD_ADD: +2.50
OS_ADD: +2.50
OS_CYLINDER: +0.50

## 2025-08-08 ASSESSMENT — EXTERNAL EXAM - RIGHT EYE: OD_EXAM: 1+ BROW PTOSIS, PROLAPSED FAT PADS: UPPER, LOWER

## 2025-08-08 ASSESSMENT — VISUAL ACUITY
CORRECTION_TYPE: GLASSES
OS_CC: 20/30
OS_CC+: -2
OD_CC: 20/30
METHOD: SNELLEN - LINEAR

## 2025-08-08 ASSESSMENT — TONOMETRY
OS_IOP_MMHG: 13
IOP_METHOD: APPLANATION
OD_IOP_MMHG: 16

## 2025-08-08 ASSESSMENT — REFRACTION_WEARINGRX
OS_SPHERE: -1.25
OS_CYLINDER: +0.50
OD_ADD: +2.33
OD_SPHERE: -1.50
OS_ADD: +2.32
OD_CYLINDER: +0.25
SPECS_TYPE: PAL
OD_AXIS: 071
OS_AXIS: 049

## 2025-08-08 ASSESSMENT — SLIT LAMP EXAM - LIDS
COMMENTS: NORMAL
COMMENTS: NORMAL

## 2025-08-08 ASSESSMENT — CUP TO DISC RATIO
OD_RATIO: 0.3
OS_RATIO: 0.25

## 2025-08-08 ASSESSMENT — EXTERNAL EXAM - LEFT EYE: OS_EXAM: 1+ BROW PTOSIS, PROLAPSED FAT PADS: UPPER, LOWER

## 2025-08-10 PROBLEM — H43.813 POSTERIOR VITREOUS DETACHMENT OF BOTH EYES: Status: ACTIVE | Noted: 2025-08-10

## 2025-08-11 ENCOUNTER — TELEPHONE (OUTPATIENT)
Dept: GASTROENTEROLOGY | Facility: CLINIC | Age: 65
End: 2025-08-11
Payer: MEDICARE

## 2025-08-13 ENCOUNTER — TELEPHONE (OUTPATIENT)
Dept: GASTROENTEROLOGY | Facility: CLINIC | Age: 65
End: 2025-08-13
Payer: MEDICARE

## 2025-09-03 ENCOUNTER — TELEPHONE (OUTPATIENT)
Dept: GASTROENTEROLOGY | Facility: CLINIC | Age: 65
End: 2025-09-03
Payer: MEDICARE

## (undated) DEVICE — SYR 30ML LL W/O NDL 302832

## (undated) DEVICE — BASIN SET MAJOR

## (undated) DEVICE — DRAPE C-ARM W/STRAPS 42X72" 07-CA104

## (undated) DEVICE — PREP CHLORAPREP 26ML TINTED ORANGE  260815

## (undated) DEVICE — LINEN GOWN X4 5410

## (undated) DEVICE — BASKET STONE RETRIEVAL NTNL ZERO TIP 1.9FRX90CM M0063901050

## (undated) DEVICE — GOWN IMPERVIOUS SPECIALTY XLG/XLONG 32474

## (undated) DEVICE — PAD FLOOR SURGSAFE 30X40" 83030

## (undated) DEVICE — PREP DYNA-HEX 4% CHG SCRUB 4OZ BOTTLE MDS098710

## (undated) DEVICE — BLADE KNIFE SURG 20 371120

## (undated) DEVICE — ADAPTER SCOPE UROLOK II LF M0067301400

## (undated) DEVICE — SU VICRYL 2-0 CT-1 27" UND J259H

## (undated) DEVICE — TUBING SUCTION MEDI-VAC SOFT 3/16"X20' N520A

## (undated) DEVICE — DECANTER VIAL 2006S

## (undated) DEVICE — SU MONOCRYL 3-0 PS-1 27" Y936H

## (undated) DEVICE — GUIDEWIRE SENSOR DUAL FLEX STR 0.035"X150CM M0066703080

## (undated) DEVICE — DRSG TEGADERM 4X10" 1627

## (undated) DEVICE — BONE CLEANING TIP INTERPULSE  0210-010-000

## (undated) DEVICE — PAD CHUX UNDERPAD 30X36" P3036C

## (undated) DEVICE — LASER FIBER HOLMIUM MOSES 200 D/F/L AC-10030100

## (undated) DEVICE — TUBING SUCTION MEDI-VAC 1/4"X20' N620A

## (undated) DEVICE — CATH URETERAL OPEN END 5FRX70CM M0064002010

## (undated) DEVICE — SYSTEM IRR 70CM CVAC CATH CVAC70

## (undated) DEVICE — HOOD SURG T7PLUS PEEL AWAY FACE SHIELD STRL LF 0416-801-100

## (undated) DEVICE — DRSG STERI STRIP 1/2X4" R1547

## (undated) DEVICE — GLOVE BIOGEL PI SZ 7.5 40875

## (undated) DEVICE — Device

## (undated) DEVICE — SOL NACL 0.9% IRRIG 1000ML BOTTLE 2F7124

## (undated) DEVICE — SPONGE LAP 18X18" X8435

## (undated) DEVICE — ESU GROUND PAD ADULT W/CORD E7507

## (undated) DEVICE — BONE CEMENT MIXEVAC HI VAC W/CARTRIDGE 0306-563-000

## (undated) DEVICE — SHEATH URETERAL ACCESS NAVIGATOR HD 11/13FRX46CM M0062502230

## (undated) DEVICE — KIT ENDO FIRST STEP DISINFECTANT 200ML W/POUCH EP-4

## (undated) DEVICE — SOL WATER IRRIG 1000ML BOTTLE 07139-09

## (undated) DEVICE — GLOVE BIOGEL PI MICRO INDICATOR UNDERGLOVE SZ 8.0 48980

## (undated) DEVICE — SU VICRYL 0 CT-1 36" J946H

## (undated) DEVICE — LINEN BACK PACK 5440

## (undated) DEVICE — STRAP KNEE/BODY 31143004

## (undated) DEVICE — SUCTION MANIFOLD NEPTUNE 2 SYS 4 PORT 0702-020-000

## (undated) DEVICE — SOL WATER IRRIG 1000ML BOTTLE 2F7114

## (undated) DEVICE — PAD CHUX UNDERPAD 23X24" 7136

## (undated) DEVICE — DRSG TEGADERM ALGINATE AG 4X5" 90303

## (undated) DEVICE — SOL NACL 0.9% IRRIG 3000ML BAG 2B7477

## (undated) DEVICE — TUBING SET THERMEDX UROLOGY SGL USE LL0006

## (undated) DEVICE — TOURNIQUET CUFF 30" REPRO BLUE 60-7070-105

## (undated) DEVICE — KIT ENDO TURNOVER/PROCEDURE CARRY-ON 101822

## (undated) DEVICE — GLOVE BIOGEL PI MICRO SZ 7.0 48570

## (undated) DEVICE — SOL WATER IRRIG 500ML BOTTLE 2F7113

## (undated) DEVICE — SHEATH URETERAL ACCESS NAVIGATOR HD 12/14FRX46CM M0062502260

## (undated) DEVICE — DRSG TEGADERM 4X4 3/4" 1626W

## (undated) DEVICE — CONTAINER SPECIMEN 4OZ STERILE 17099

## (undated) DEVICE — PREP CHLORAPREP 26ML TINTED HI-LITE ORANGE 930815

## (undated) DEVICE — BNDG ELASTIC 6" DBL LENGTH UNSTERILE 6611-16

## (undated) DEVICE — SUCTION IRR SYSTEM W/O TIP INTERPULSE HANDPIECE 0210-100-000

## (undated) DEVICE — DRAPE U-DRAPE 1015NSD NON-STERILE

## (undated) DEVICE — GOWN IMPERVIOUS 2XL BLUE

## (undated) DEVICE — BLADE CLIPPER SGL USE 9680

## (undated) DEVICE — SPECIMEN CONTAINER 3OZ W/FORMALIN 59901

## (undated) DEVICE — LINEN TOWEL PACK X5 5464

## (undated) DEVICE — CATH URETERAL DUAL LUMEN 10FRX54CM M0064051000

## (undated) DEVICE — SUCTION MANIFOLD NEPTUNE 2 SYS 1 PORT 702-025-000

## (undated) DEVICE — BLADE SAW SAGITTAL STRK 18X90X1.27MM HD SYS 6 6118-127-090

## (undated) DEVICE — SU DERMABOND ADVANCED .7ML DNX12

## (undated) RX ORDER — PROPOFOL 10 MG/ML
INJECTION, EMULSION INTRAVENOUS
Status: DISPENSED
Start: 2024-06-05

## (undated) RX ORDER — HYDROMORPHONE HYDROCHLORIDE 1 MG/ML
INJECTION, SOLUTION INTRAMUSCULAR; INTRAVENOUS; SUBCUTANEOUS
Status: DISPENSED
Start: 2024-10-23

## (undated) RX ORDER — METHOCARBAMOL 500 MG/1
TABLET, FILM COATED ORAL
Status: DISPENSED
Start: 2024-10-23

## (undated) RX ORDER — FENTANYL CITRATE 50 UG/ML
INJECTION, SOLUTION INTRAMUSCULAR; INTRAVENOUS
Status: DISPENSED
Start: 2024-10-23

## (undated) RX ORDER — KETOROLAC TROMETHAMINE 30 MG/ML
INJECTION, SOLUTION INTRAMUSCULAR; INTRAVENOUS
Status: DISPENSED
Start: 2024-06-05

## (undated) RX ORDER — HYDROMORPHONE HYDROCHLORIDE 1 MG/ML
INJECTION, SOLUTION INTRAMUSCULAR; INTRAVENOUS; SUBCUTANEOUS
Status: DISPENSED
Start: 2024-06-19

## (undated) RX ORDER — CEFAZOLIN SODIUM/WATER 2 G/20 ML
SYRINGE (ML) INTRAVENOUS
Status: DISPENSED
Start: 2024-10-23

## (undated) RX ORDER — CEFAZOLIN SODIUM/WATER 2 G/20 ML
SYRINGE (ML) INTRAVENOUS
Status: DISPENSED
Start: 2024-06-05

## (undated) RX ORDER — KETOROLAC TROMETHAMINE 15 MG/ML
INJECTION, SOLUTION INTRAMUSCULAR; INTRAVENOUS
Status: DISPENSED
Start: 2024-06-05

## (undated) RX ORDER — DEXAMETHASONE SODIUM PHOSPHATE 4 MG/ML
INJECTION, SOLUTION INTRA-ARTICULAR; INTRALESIONAL; INTRAMUSCULAR; INTRAVENOUS; SOFT TISSUE
Status: DISPENSED
Start: 2024-06-05

## (undated) RX ORDER — OXYCODONE HYDROCHLORIDE 5 MG/1
TABLET ORAL
Status: DISPENSED
Start: 2024-10-23

## (undated) RX ORDER — FENTANYL CITRATE 50 UG/ML
INJECTION, SOLUTION INTRAMUSCULAR; INTRAVENOUS
Status: DISPENSED
Start: 2024-06-19

## (undated) RX ORDER — PROPOFOL 10 MG/ML
INJECTION, EMULSION INTRAVENOUS
Status: DISPENSED
Start: 2024-10-23

## (undated) RX ORDER — FENTANYL CITRATE 50 UG/ML
INJECTION, SOLUTION INTRAMUSCULAR; INTRAVENOUS
Status: DISPENSED
Start: 2019-01-24

## (undated) RX ORDER — LIDOCAINE HYDROCHLORIDE 20 MG/ML
JELLY TOPICAL
Status: DISPENSED
Start: 2024-06-19

## (undated) RX ORDER — CEFAZOLIN SODIUM/WATER 2 G/20 ML
SYRINGE (ML) INTRAVENOUS
Status: DISPENSED
Start: 2024-06-19

## (undated) RX ORDER — FENTANYL CITRATE 50 UG/ML
INJECTION, SOLUTION INTRAMUSCULAR; INTRAVENOUS
Status: DISPENSED
Start: 2021-04-05

## (undated) RX ORDER — ONDANSETRON 2 MG/ML
INJECTION INTRAMUSCULAR; INTRAVENOUS
Status: DISPENSED
Start: 2024-06-05

## (undated) RX ORDER — OXYCODONE HYDROCHLORIDE 5 MG/1
TABLET ORAL
Status: DISPENSED
Start: 2024-06-05

## (undated) RX ORDER — OXYCODONE HYDROCHLORIDE 5 MG/1
TABLET ORAL
Status: DISPENSED
Start: 2024-06-19

## (undated) RX ORDER — FENTANYL CITRATE 50 UG/ML
INJECTION, SOLUTION INTRAMUSCULAR; INTRAVENOUS
Status: DISPENSED
Start: 2024-06-05

## (undated) RX ORDER — SIMETHICONE 40MG/0.6ML
SUSPENSION, DROPS(FINAL DOSAGE FORM)(ML) ORAL
Status: DISPENSED
Start: 2019-01-24

## (undated) RX ORDER — ACETAMINOPHEN 325 MG/1
TABLET ORAL
Status: DISPENSED
Start: 2024-10-23

## (undated) RX ORDER — LIDOCAINE HYDROCHLORIDE 20 MG/ML
JELLY TOPICAL
Status: DISPENSED
Start: 2024-06-05

## (undated) RX ORDER — EPHEDRINE SULFATE 50 MG/ML
INJECTION, SOLUTION INTRAMUSCULAR; INTRAVENOUS; SUBCUTANEOUS
Status: DISPENSED
Start: 2024-06-05

## (undated) RX ORDER — SODIUM CHLORIDE, SODIUM LACTATE, POTASSIUM CHLORIDE, CALCIUM CHLORIDE 600; 310; 30; 20 MG/100ML; MG/100ML; MG/100ML; MG/100ML
INJECTION, SOLUTION INTRAVENOUS
Status: DISPENSED
Start: 2024-10-23

## (undated) RX ORDER — TRANEXAMIC ACID 650 MG/1
TABLET ORAL
Status: DISPENSED
Start: 2024-10-23